# Patient Record
Sex: MALE | Race: WHITE | NOT HISPANIC OR LATINO | URBAN - METROPOLITAN AREA
[De-identification: names, ages, dates, MRNs, and addresses within clinical notes are randomized per-mention and may not be internally consistent; named-entity substitution may affect disease eponyms.]

---

## 2018-05-05 ENCOUNTER — INPATIENT (INPATIENT)
Facility: HOSPITAL | Age: 58
LOS: 5 days | Discharge: HOME | DRG: 41 | End: 2018-05-11
Attending: HOSPITALIST | Admitting: HOSPITALIST
Payer: COMMERCIAL

## 2018-05-05 VITALS
OXYGEN SATURATION: 97 % | HEIGHT: 73 IN | TEMPERATURE: 98 F | DIASTOLIC BLOOD PRESSURE: 74 MMHG | HEART RATE: 73 BPM | WEIGHT: 190.04 LBS | RESPIRATION RATE: 20 BRPM | SYSTOLIC BLOOD PRESSURE: 130 MMHG

## 2018-05-05 DIAGNOSIS — Z29.9 ENCOUNTER FOR PROPHYLACTIC MEASURES, UNSPECIFIED: ICD-10-CM

## 2018-05-05 DIAGNOSIS — F10.21 ALCOHOL DEPENDENCE, IN REMISSION: ICD-10-CM

## 2018-05-05 DIAGNOSIS — T14.91XA SUICIDE ATTEMPT, INITIAL ENCOUNTER: ICD-10-CM

## 2018-05-05 DIAGNOSIS — F32.2 MAJOR DEPRESSIVE DISORDER, SINGLE EPISODE, SEVERE WITHOUT PSYCHOTIC FEATURES: ICD-10-CM

## 2018-05-05 DIAGNOSIS — E87.1 HYPO-OSMOLALITY AND HYPONATREMIA: ICD-10-CM

## 2018-05-05 DIAGNOSIS — R74.0 NONSPECIFIC ELEVATION OF LEVELS OF TRANSAMINASE AND LACTIC ACID DEHYDROGENASE [LDH]: ICD-10-CM

## 2018-05-05 LAB
AMPHET UR-MCNC: NEGATIVE — SIGNIFICANT CHANGE UP
ANION GAP SERPL CALC-SCNC: 10 MMOL/L — SIGNIFICANT CHANGE UP (ref 5–17)
ANION GAP SERPL CALC-SCNC: 13 MMOL/L — SIGNIFICANT CHANGE UP (ref 5–17)
ANION GAP SERPL CALC-SCNC: 14 MMOL/L — SIGNIFICANT CHANGE UP (ref 5–17)
APAP SERPL-MCNC: <15 UG/ML — SIGNIFICANT CHANGE UP (ref 10–30)
APPEARANCE UR: CLEAR — SIGNIFICANT CHANGE UP
BACTERIA # UR AUTO: PRESENT /HPF
BARBITURATES UR SCN-MCNC: NEGATIVE — SIGNIFICANT CHANGE UP
BASOPHILS NFR BLD AUTO: 0.3 % — SIGNIFICANT CHANGE UP (ref 0–2)
BENZODIAZ UR-MCNC: NEGATIVE — SIGNIFICANT CHANGE UP
BILIRUB UR-MCNC: NEGATIVE — SIGNIFICANT CHANGE UP
BUN SERPL-MCNC: 11 MG/DL — SIGNIFICANT CHANGE UP (ref 7–23)
BUN SERPL-MCNC: 12 MG/DL — SIGNIFICANT CHANGE UP (ref 7–23)
BUN SERPL-MCNC: 13 MG/DL — SIGNIFICANT CHANGE UP (ref 7–23)
CALCIUM SERPL-MCNC: 8.2 MG/DL — LOW (ref 8.4–10.5)
CALCIUM SERPL-MCNC: 8.4 MG/DL — SIGNIFICANT CHANGE UP (ref 8.4–10.5)
CALCIUM SERPL-MCNC: 8.9 MG/DL — SIGNIFICANT CHANGE UP (ref 8.4–10.5)
CHLORIDE SERPL-SCNC: 85 MMOL/L — LOW (ref 96–108)
CHLORIDE SERPL-SCNC: 88 MMOL/L — LOW (ref 96–108)
CHLORIDE SERPL-SCNC: 91 MMOL/L — LOW (ref 96–108)
CO2 SERPL-SCNC: 21 MMOL/L — LOW (ref 22–31)
CO2 SERPL-SCNC: 22 MMOL/L — SIGNIFICANT CHANGE UP (ref 22–31)
CO2 SERPL-SCNC: 25 MMOL/L — SIGNIFICANT CHANGE UP (ref 22–31)
COCAINE METAB.OTHER UR-MCNC: NEGATIVE — SIGNIFICANT CHANGE UP
COLOR SPEC: YELLOW — SIGNIFICANT CHANGE UP
CREAT ?TM UR-MCNC: 102 MG/DL — SIGNIFICANT CHANGE UP
CREAT SERPL-MCNC: 0.61 MG/DL — SIGNIFICANT CHANGE UP (ref 0.5–1.3)
CREAT SERPL-MCNC: 0.7 MG/DL — SIGNIFICANT CHANGE UP (ref 0.5–1.3)
CREAT SERPL-MCNC: 0.7 MG/DL — SIGNIFICANT CHANGE UP (ref 0.5–1.3)
DIFF PNL FLD: (no result)
EOSINOPHIL NFR BLD AUTO: 0.8 % — SIGNIFICANT CHANGE UP (ref 0–6)
EPI CELLS # UR: SIGNIFICANT CHANGE UP /HPF (ref 0–5)
GLUCOSE SERPL-MCNC: 101 MG/DL — HIGH (ref 70–99)
GLUCOSE SERPL-MCNC: 161 MG/DL — HIGH (ref 70–99)
GLUCOSE SERPL-MCNC: 87 MG/DL — SIGNIFICANT CHANGE UP (ref 70–99)
GLUCOSE UR QL: NEGATIVE — SIGNIFICANT CHANGE UP
HCT VFR BLD CALC: 39.1 % — SIGNIFICANT CHANGE UP (ref 39–50)
HGB BLD-MCNC: 14.2 G/DL — SIGNIFICANT CHANGE UP (ref 13–17)
KETONES UR-MCNC: 15 MG/DL
LEUKOCYTE ESTERASE UR-ACNC: NEGATIVE — SIGNIFICANT CHANGE UP
LYMPHOCYTES # BLD AUTO: 13.7 % — SIGNIFICANT CHANGE UP (ref 13–44)
MCHC RBC-ENTMCNC: 33.1 PG — SIGNIFICANT CHANGE UP (ref 27–34)
MCHC RBC-ENTMCNC: 36.3 G/DL — HIGH (ref 32–36)
MCV RBC AUTO: 91.1 FL — SIGNIFICANT CHANGE UP (ref 80–100)
METHADONE UR-MCNC: NEGATIVE — SIGNIFICANT CHANGE UP
MONOCYTES NFR BLD AUTO: 16.7 % — HIGH (ref 2–14)
NEUTROPHILS NFR BLD AUTO: 68.5 % — SIGNIFICANT CHANGE UP (ref 43–77)
NITRITE UR-MCNC: NEGATIVE — SIGNIFICANT CHANGE UP
OPIATES UR-MCNC: NEGATIVE — SIGNIFICANT CHANGE UP
PCP SPEC-MCNC: SIGNIFICANT CHANGE UP
PCP UR-MCNC: NEGATIVE — SIGNIFICANT CHANGE UP
PH UR: 6.5 — SIGNIFICANT CHANGE UP (ref 5–8)
PLATELET # BLD AUTO: 241 K/UL — SIGNIFICANT CHANGE UP (ref 150–400)
POTASSIUM SERPL-MCNC: 3.7 MMOL/L — SIGNIFICANT CHANGE UP (ref 3.5–5.3)
POTASSIUM SERPL-MCNC: 3.9 MMOL/L — SIGNIFICANT CHANGE UP (ref 3.5–5.3)
POTASSIUM SERPL-MCNC: 4.1 MMOL/L — SIGNIFICANT CHANGE UP (ref 3.5–5.3)
POTASSIUM SERPL-SCNC: 3.7 MMOL/L — SIGNIFICANT CHANGE UP (ref 3.5–5.3)
POTASSIUM SERPL-SCNC: 3.9 MMOL/L — SIGNIFICANT CHANGE UP (ref 3.5–5.3)
POTASSIUM SERPL-SCNC: 4.1 MMOL/L — SIGNIFICANT CHANGE UP (ref 3.5–5.3)
PROT UR-MCNC: (no result) MG/DL
RBC # BLD: 4.29 M/UL — SIGNIFICANT CHANGE UP (ref 4.2–5.8)
RBC # FLD: 11.8 % — SIGNIFICANT CHANGE UP (ref 10.3–16.9)
RBC CASTS # UR COMP ASSIST: (no result) /HPF
SALICYLATES SERPL-MCNC: <0.3 MG/DL — LOW (ref 2.8–20)
SODIUM SERPL-SCNC: 121 MMOL/L — LOW (ref 135–145)
SODIUM SERPL-SCNC: 122 MMOL/L — LOW (ref 135–145)
SODIUM SERPL-SCNC: 126 MMOL/L — LOW (ref 135–145)
SODIUM UR-SCNC: 44 MMOL/L — SIGNIFICANT CHANGE UP
SP GR SPEC: 1.01 — SIGNIFICANT CHANGE UP (ref 1–1.03)
THC UR QL: NEGATIVE — SIGNIFICANT CHANGE UP
TSH SERPL-MCNC: 0.93 UIU/ML — SIGNIFICANT CHANGE UP (ref 0.35–4.94)
UROBILINOGEN FLD QL: 0.2 E.U./DL — SIGNIFICANT CHANGE UP
WBC # BLD: 6.4 K/UL — SIGNIFICANT CHANGE UP (ref 3.8–10.5)
WBC # FLD AUTO: 6.4 K/UL — SIGNIFICANT CHANGE UP (ref 3.8–10.5)
WBC UR QL: < 5 /HPF — SIGNIFICANT CHANGE UP

## 2018-05-05 PROCEDURE — 99223 1ST HOSP IP/OBS HIGH 75: CPT | Mod: GC

## 2018-05-05 PROCEDURE — 93010 ELECTROCARDIOGRAM REPORT: CPT

## 2018-05-05 PROCEDURE — 71046 X-RAY EXAM CHEST 2 VIEWS: CPT | Mod: 26

## 2018-05-05 PROCEDURE — 99285 EMERGENCY DEPT VISIT HI MDM: CPT | Mod: 25

## 2018-05-05 RX ORDER — SODIUM CHLORIDE 9 MG/ML
1000 INJECTION INTRAMUSCULAR; INTRAVENOUS; SUBCUTANEOUS ONCE
Qty: 0 | Refills: 0 | Status: COMPLETED | OUTPATIENT
Start: 2018-05-05 | End: 2018-05-05

## 2018-05-05 RX ORDER — TETANUS TOXOID, REDUCED DIPHTHERIA TOXOID AND ACELLULAR PERTUSSIS VACCINE, ADSORBED 5; 2.5; 8; 8; 2.5 [IU]/.5ML; [IU]/.5ML; UG/.5ML; UG/.5ML; UG/.5ML
0.5 SUSPENSION INTRAMUSCULAR ONCE
Qty: 0 | Refills: 0 | Status: COMPLETED | OUTPATIENT
Start: 2018-05-05 | End: 2018-05-05

## 2018-05-05 RX ORDER — ACETAMINOPHEN 500 MG
1000 TABLET ORAL ONCE
Qty: 0 | Refills: 0 | Status: COMPLETED | OUTPATIENT
Start: 2018-05-05 | End: 2018-05-05

## 2018-05-05 RX ORDER — ACETAMINOPHEN 500 MG
650 TABLET ORAL EVERY 6 HOURS
Qty: 0 | Refills: 0 | Status: DISCONTINUED | OUTPATIENT
Start: 2018-05-06 | End: 2018-05-11

## 2018-05-05 RX ORDER — SODIUM CHLORIDE 9 MG/ML
1000 INJECTION INTRAMUSCULAR; INTRAVENOUS; SUBCUTANEOUS
Qty: 0 | Refills: 0 | Status: DISCONTINUED | OUTPATIENT
Start: 2018-05-05 | End: 2018-05-05

## 2018-05-05 RX ADMIN — TETANUS TOXOID, REDUCED DIPHTHERIA TOXOID AND ACELLULAR PERTUSSIS VACCINE, ADSORBED 0.5 MILLILITER(S): 5; 2.5; 8; 8; 2.5 SUSPENSION INTRAMUSCULAR at 18:53

## 2018-05-05 RX ADMIN — Medication 1000 MILLIGRAM(S): at 23:00

## 2018-05-05 RX ADMIN — Medication 400 MILLIGRAM(S): at 22:57

## 2018-05-05 RX ADMIN — SODIUM CHLORIDE 150 MILLILITER(S): 9 INJECTION INTRAMUSCULAR; INTRAVENOUS; SUBCUTANEOUS at 20:45

## 2018-05-05 RX ADMIN — SODIUM CHLORIDE 2000 MILLILITER(S): 9 INJECTION INTRAMUSCULAR; INTRAVENOUS; SUBCUTANEOUS at 16:49

## 2018-05-05 RX ADMIN — SODIUM CHLORIDE 2000 MILLILITER(S): 9 INJECTION INTRAMUSCULAR; INTRAVENOUS; SUBCUTANEOUS at 18:11

## 2018-05-05 NOTE — H&P ADULT - PROBLEM SELECTOR PLAN 5
F: 150 cc/hr NS  E: replete PRN  N: regular diet  No HSQ needed  Full Code  Dispo: Medicine then psychiatry. When discharged patient will need primary care follow up as he does not have one. Mild transaminiits on admission labs.   -Hepatitis Panel in AM, can be followed up outpatient.  -CMP in AM

## 2018-05-05 NOTE — ED PROVIDER NOTE - OBJECTIVE STATEMENT
59 yo male h/o etoh abuse (sober x 10y), depression c/o financial difficulties that are making him depressed and suicidal.  Pt cut his L wrist w a knife today to try to kill himself.  Pt denies other self harm, etoh and drug abuse.  Pt reports poor sleep and no relief w ambien. Wife made 1st appt w psychiatrist for 5/8.  Pt notes numbness in L index and middle finger, denies sig pain at wound.  Unsure of last td. No other physical complaint.

## 2018-05-05 NOTE — ED ADULT NURSE REASSESSMENT NOTE - NS ED NURSE REASSESS COMMENT FT1
Pt to be admitted to medicine with psych coverage per ANTOINETTE Benitez. Report called to 7 uris ARABELLA Robison, aware of pt's 1:1 status. Pt belongings checked by security for safety prior to transport to 7 [facility]. EDT Hallie remaining on 1:1 for transport, per AND EDT to return to ED after transport completed. Pt in transport to 7 uris.
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
## 2018-05-05 NOTE — ED BEHAVIORAL HEALTH ASSESSMENT NOTE - RISK ASSESSMENT
Patient is at acute risk of harm to self given this morning's attempt, feelings of entrapment and ruminative flooding, the seriousness of his financial situation, his ambivalence and intense guilt after this morning's attempt, his gender and race, and lack of outpatient treatment. He require inpatient hospitalization for his safety.

## 2018-05-05 NOTE — ED ADULT TRIAGE NOTE - CHIEF COMPLAINT QUOTE
" I cut my arm this morning with a knife , I was upset with so many things ," Denies any hearing voices nor hallucination .

## 2018-05-05 NOTE — ED BEHAVIORAL HEALTH ASSESSMENT NOTE - NS ED BHA MSE SPEECH VOLUME
I will START or STAY ON the medications listed below when I get home from the hospital:    doxycycline hyclate 100 mg oral tablet  -- 1 tab(s) by mouth 2 times a day x 5 days  -- Indication: For Cellulitis    clotrimazole 1% topical cream  -- 1 application on skin 2 times a day  -- Indication: For tinea    Cipro 250 mg oral tablet  -- 1 tab(s) by mouth every 12 hours x 5 days  -- Indication: For Cellulitis
Normal

## 2018-05-05 NOTE — H&P ADULT - HISTORY OF PRESENT ILLNESS
58 year old male hx etoh abuse sober for 20+ years, and self-reported hx of depression presents to [facility] after suicide attempt. Patient reports that             In the ED:  ICU Vital Signs Last 24 Hrs  T(C): 36.8 (05 May 2018 15:08), Max: 36.8 (05 May 2018 15:08)  T(F): 98.3 (05 May 2018 15:08), Max: 98.3 (05 May 2018 15:08)  HR: 73 (05 May 2018 15:08) (73 - 73)  BP: 130/74 (05 May 2018 15:08) (130/74 - 130/74)  BP(mean): --  ABP: --  ABP(mean): --  RR: 20 (05 May 2018 15:08) (20 - 20)  SpO2: 97% (05 May 2018 15:08) (97% - 97%)    Patient received 2 Liters NS bolus, and started on 150cc normal saline per hour. Seen by plastics and pscyhiatry in ER. 58 year old male hx etoh abuse sober for 20+ years, and self-reported hx of depression presents to [facility] after suicide attempt. Patient at bedside with his wife and sitter. They both report that over the last year the stressors on them have been astronomical. Patient reports several years prior he and his wife bought a house in the [facility] which is now costing them a significant amount of money as renter is paying half the amount. In addition he states since the beginning of the year he has not been able to sleep well and has been taking a significant amount of supplements which he believes have been contributing to his increasing anxiety and lack of sleep. He also has been working 2 jobs. He also reports decreased PO intake over the last 1-2 weeks. He has been sober from etoh since 1993. Denies other drug use. Patient's stressors pushed him to attempt suicide by slitting his wrist (left). Immediately after, he regretted his decision and came to [facility] for evaluation. Denies headache, vision changes, chest pain, shortness of breath, abdominal pain, urinary issues.     In the ED:  ICU Vital Signs Last 24 Hrs  T(C): 36.8 (05 May 2018 15:08), Max: 36.8 (05 May 2018 15:08)  T(F): 98.3 (05 May 2018 15:08), Max: 98.3 (05 May 2018 15:08)  HR: 73 (05 May 2018 15:08) (73 - 73)  BP: 130/74 (05 May 2018 15:08) (130/74 - 130/74)  BP(mean): --  ABP: --  ABP(mean): --  RR: 20 (05 May 2018 15:08) (20 - 20)  SpO2: 97% (05 May 2018 15:08) (97% - 97%)    Patient received 2 Liters NS bolus, and started on 150cc normal saline per hour. Seen by plastics and pscyhiatry in ER.

## 2018-05-05 NOTE — ED BEHAVIORAL HEALTH ASSESSMENT NOTE - SUMMARY
The patient is a 58-year old man with a past history of alcohol use disorder, likely depressive illness, not currently in outpatient care, who presented to ED today after suicide attempt via cutting wrist. He is currently experiencing ruminative thoughts, intense guilt, feeling trapped, and although not currently suicidal at the moment, represents an acute suicide risk given this morning's attempt, feelings of entrapment and ruminative flooding, the seriousness of his financial situation, his ambivalence and intense guilt after this morning's attempt, his gender and race, and lack of outpatient treatment. He require inpatient hospitalization for his safety.

## 2018-05-05 NOTE — H&P ADULT - PROBLEM SELECTOR PLAN 3
Likely hypovolemic hyponatremia s/p 2 liters. Serum osmolality low, increased urine osmolality. Small improvement of 121-122 after 2 liters of fluids. Patient asymptomatic, increasing likelyhood of non-acute hyponatremia.   -c/w NS 150cc/hr   -BMP 10PM  -Avoid over-correction, no more than 6-8 mEq over 24 hours. s/p Suicide attempt with multiple stressors as above. Negative urine tox, salicylate level,   -f/u psychiatry: Transfer to -[facility] once sodium improves and bed available. s/p plastic surgery repair, follow up recs

## 2018-05-05 NOTE — ED BEHAVIORAL HEALTH ASSESSMENT NOTE - DETAILS
No admissions or presentations in last 2 years Patient attempted suicide today Spoke to Nayan BELL Spoke with  Director in ED, who will sign out patient to oncoming staff Alcoholism in family Left hand numbness

## 2018-05-05 NOTE — ED BEHAVIORAL HEALTH ASSESSMENT NOTE - HPI (INCLUDE ILLNESS QUALITY, SEVERITY, DURATION, TIMING, CONTEXT, MODIFYING FACTORS, ASSOCIATED SIGNS AND SYMPTOMS)
Mr. Jimenez is a 58-year-old , employed, gentleman, domiciled with his wife, with a past psychiatric history of alcohol use disorder in complete remission, sober since 1993, in , a self reported history of depression, not currently in outpatient treatment, no history of suicide attempt or hospitalizations, who presents to the ED this evening accompanied by his wife after intentionally cutting his left wrist this morning with an intent to die.     Patient received calm and cooperative at bedside with wife present. The patient explains that he and his wife had bought a house in the [facility] many years ago, and after a series of bad decisions, were now in dire financial straits. They have had a young man renting the apartment but only paying half of the rent. The patient, while considering the financial mess he is now in, and seeing no way out, decided to end his life by cutting his left wrist with a knife this morning while his wife was out. He confirms an intent to die. He now feels quite guilty about his actions, but even more so about the actions that led to his current financial position. The patient says he wishes he would have called someone from  or gone to a meeting. The patient's wife says that he is usually so strong and she does not know why this happened. The patient goes on the explain that since January, he has not been sleeping well. In the months leading up to January, he had been taking Theanine as a supplement, as much as 4 times a day, which soon led to intense anxiety. Since January, he has had insomnia and worsening depressive symptoms. He endorses rumination and now feeling trapped by his financial situation. 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 He denies any urges to drink, denies any history of suicide attempt, but does say that he used to feel suicidal at times while drinking. Of note, the patient has an upcoming psychiatry appointment at the [facility] with Dr. Rich Mayo on 5/8.

## 2018-05-05 NOTE — H&P ADULT - PROBLEM SELECTOR PLAN 4
F: 150 cc/hr NS  E: replete PRN  N: regular diet  No HSQ needed  Full Code Mild transaminiits on admission labs.   -Hepatitis Panel in AM, can be followed up outpatient.  -CMP in AM s/p Suicide attempt with multiple stressors as above. Negative urine tox, salicylate level,   -f/u psychiatry: Transfer to -[facility] once sodium improves and bed available.

## 2018-05-05 NOTE — ED BEHAVIORAL HEALTH ASSESSMENT NOTE - PRIMARY DX
Current severe episode of major depressive disorder without psychotic features without prior episode Alcohol use disorder, moderate, in sustained remission

## 2018-05-05 NOTE — H&P ADULT - PROBLEM SELECTOR PLAN 6
F: 150 cc/hr NS  E: replete PRN  N: regular diet  No HSQ needed  Full Code  Dispo: Medicine then psychiatry. When discharged patient will need primary care follow up as he does not have one.

## 2018-05-05 NOTE — ED BEHAVIORAL HEALTH ASSESSMENT NOTE - MEDICAL ISSUES AND PLAN (INCLUDE STANDING AND PRN MEDICATION)
Patient with left median nerve damage, currently in splint, Dr. Jo will follow [phone]; patient will require surgery within next two weeks; will order tylenol PRN for pain

## 2018-05-05 NOTE — ED PROVIDER NOTE - CARE PLAN
Principal Discharge DX:	Suicidal ideation  Secondary Diagnosis:	Wrist laceration, left, initial encounter  Secondary Diagnosis:	Tendon laceration

## 2018-05-05 NOTE — PATIENT PROFILE ADULT. - PRO MENTAL HEALTH SX RECENT
difficulty concentrating/angry/irritable/self-blame/change in sleep pattern/feeling depressed/feeling down/suicidal ideation/attempt to harm/feeling hopelessness/little interest or pleasure in doing things/sad

## 2018-05-05 NOTE — H&P ADULT - NSHPLABSRESULTS_GEN_ALL_CORE
.  LABS:                         14.2   6.4   )-----------( 241      ( 05 May 2018 16:08 )             39.1         122<L>  |  88<L>  |  11  ----------------------------<  87  3.9   |  21<L>  |  0.61    Ca    8.2<L>      05 May 2018 19:08    TPro  7.2  /  Alb  4.2  /  TBili  0.8  /  DBili  <0.2  /  AST  55<H>  /  ALT  49<H>  /  AlkPhos  37<L>        Urinalysis Basic - ( 05 May 2018 17:39 )    Color: Yellow / Appearance: Clear / S.015 / pH: x  Gluc: x / Ketone: 15 mg/dL  / Bili: Negative / Urobili: 0.2 E.U./dL   Blood: x / Protein: Trace mg/dL / Nitrite: NEGATIVE   Leuk Esterase: NEGATIVE / RBC: 5-10 /HPF / WBC < 5 /HPF   Sq Epi: x / Non Sq Epi: 0-5 /HPF / Bacteria: Present /HPF                RADIOLOGY, EKG & ADDITIONAL TESTS: Reviewed.

## 2018-05-05 NOTE — H&P ADULT - ASSESSMENT
59 yo old male with suicide attempt left wrist laceration admitted to medicine due to hypovolemic hyponatremia 2/2 decreased PO intake with plan to transfer to [facility]-ur[facility] psychiatry once medically cleared and bed-available.

## 2018-05-05 NOTE — ED PROVIDER NOTE - PROGRESS NOTE DETAILS
Dr Jo consulted for wrist lac and will come to ed to eval pt.  Psych consulted - they are deferring pt's eval until oncoming md arrives at 6pm and state that there are currently no male beds in the system so pt may be dispo issue if he needs admit. Na low - pt reports poor po's, suspect dehydration.  Serum osm low c/w this, await urine lytes.  NS bolus ordered.  Pt awaits psych, hand. Pt eval by palmaris longus severed and flexor carpi radialis 70% severed as well as median nerve severed.  He closed the wound and placed pt in a splint.  Pt needs operative repair within 2 wks if he's psychiatrically cleared but he will not take pt to OR while pt at risk for noncompliance splint isolation and treatment.  Psych in ed to eval pt. Pt eval by palmaris longus severed and flexor carpi radialis 70% severed as well as median nerve severed.  He closed the wound and placed pt in a splint.  Pt needs operative repair within 2 wks (can be outpt) if he's psychiatrically cleared but he will not take pt to OR while pt suicidal and at high risk for noncompliance with splint isolation and treatment.  Psych in ed to eval pt. Eval by psych - pt needs admit; they are working on obtaining a bed. Pt pending repeat bmp after ivf.  If improved bmp, pt will be medically clear for psych admission and will need further mgmt of his lac and tendon/nerve injury with Dr Jo once psychiatrically clear within the next two wks.  Pt will be signed out at 7pm to Dr Smith and ANTOINETTE Benitez pending bmp and psych dispo. trino: pt received at sign out from dr lópez as si attempt (cut wrist -- seen and sutured/splinted by hand given tendon injury), seen by psych and recommending admission but no beds at this time, pt also found to be hyponatremic - believed to be 2/2 lack of po intake, however unchanged after ivf, will admit to med until lytes improve then to be transferred to psych

## 2018-05-05 NOTE — H&P ADULT - PROBLEM SELECTOR PLAN 1
Suicide attempt as above with left wrist injury. S/p suturing by plastic surgery Dr. Jo with severe tendon damage. No pain.   -f/u plastics Dr. Jo  -Serial examinations of left wrist/ fingers Suicide attempt as above with left wrist injury. S/p suturing by plastic surgery Dr. Jo with severe tendon damage. No pain.   -f/u plastics Dr. Jo  -Serial examinations of left wrist/ fingers  -f/u Psych: 1:1 constant observation, and transfer once medically cleared. Likely hypovolemic hyponatremia s/p 2 liters. Serum osmolality low, increased urine osmolality. Small improvement of 121-122 after 2 liters of fluids. Patient asymptomatic, increasing likelyhood of non-acute hyponatremia.   -c/w NS 150cc/hr   -BMP 10PM  -Avoid over-correction, no more than 6-8 mEq over 24 hours.

## 2018-05-05 NOTE — ED BEHAVIORAL HEALTH ASSESSMENT NOTE - INDICATION
Patient with ace bandage around left wrist that could potentially be used as device for strangulation

## 2018-05-05 NOTE — PATIENT PROFILE ADULT. - VISION (WITH CORRECTIVE LENSES IF THE PATIENT USUALLY WEARS THEM):
Normal vision: sees adequately in most situations; can see medication labels, newsprint/wears for distance

## 2018-05-05 NOTE — H&P ADULT - PROBLEM SELECTOR PROBLEM 2
Current severe episode of major depressive disorder without psychotic features without prior episode Suicide attempt

## 2018-05-05 NOTE — H&P ADULT - PROBLEM SELECTOR PLAN 2
s/p Suicide attempt with multiple stressors as above. Negative urine tox, salicylate level,   -f/u psychiatry: Transfer to -[facility] once sodium improves and bed available. Suicide attempt as above with left wrist injury. S/p suturing by plastic surgery Dr. Jo with severe tendon damage. No pain.   -f/u plastics Dr. Jo  -Serial examinations of left wrist/ fingers  -f/u Psych: 1:1 constant observation, and transfer once medically cleared.

## 2018-05-05 NOTE — H&P ADULT - ATTENDING COMMENTS
patient seen and examined    reviewed vs, labs, available radiological reports/ studies, ekg     agree w/ PE findings as above w/ additions/ exceptions/ pertinent findings:     1. hyponatremia:   2 suicide attempt  3. monitor LFTs, check hepatitis panel    3.   4. patient seen and examined    reviewed vs, labs, available radiological reports/ studies, ekg     agree w/ PE findings as above,  exam deferred    1. hyponatremia: on IVFs initially overnight, dcd to avoid sodium overcorrection, monitor BMP   2 suicide attempt: on constant observation, followup psych recs  3. monitor LFTs, check hepatitis panel    3.   4.

## 2018-05-05 NOTE — ED PROVIDER NOTE - MEDICAL DECISION MAKING DETAILS
Pt w si w gesture - cut wrist w subsequent tendon lac and decreased sensation suggestive of nerve injury as well; motor function intact.  Pt placed on 1:1; psych clearance ordered.  Psych consulted and will eval.  Hand consulted for complicated wrist lac.  Td updated.  Pt likely needs psych admit for si.

## 2018-05-05 NOTE — H&P ADULT - NSHPPHYSICALEXAM_GEN_ALL_CORE
.  VITAL SIGNS:  T(C): 36.8 (05-05-18 @ 15:08), Max: 36.8 (05-05-18 @ 15:08)  T(F): 98.3 (05-05-18 @ 15:08), Max: 98.3 (05-05-18 @ 15:08)  HR: 73 (05-05-18 @ 15:08) (73 - 73)  BP: 130/74 (05-05-18 @ 15:08) (130/74 - 130/74)  BP(mean): --  RR: 20 (05-05-18 @ 15:08) (20 - 20)  SpO2: 97% (05-05-18 @ 15:08) (97% - 97%)  Wt(kg): --    PHYSICAL EXAM:    Constitutional: WDWN resting comfortably in bed; NAD  Head: NC/AT  Eyes: PERRL, EOMI, anicteric sclera  ENT: no nasal discharge; uvula midline, no oropharyngeal erythema or exudates; MMM  Neck: supple; no JVD or thyromegaly  Respiratory: CTA B/L; no W/R/R, no retractions  Cardiac: +S1/S2; RRR; no M/R/G; PMI non-displaced  Gastrointestinal: soft, NT/ND; no rebound or guarding; +BSx4  Genitourinary: normal external genitalia  Back: spine midline, no bony tenderness or step-offs; no CVAT B/L  Extremities: WWP, no clubbing or cyanosis; no peripheral edema  Musculoskeletal: NROM x4; no joint swelling, tenderness or erythema  Vascular: 2+ radial, femoral, DP/PT pulses B/L  Dermatologic: skin warm, dry and intact; no rashes, wounds, or scars  Lymphatic: no submandibular or cervical LAD  Neurologic: AAOx3; CNII-XII grossly intact; no focal deficits  Psychiatric: affect and characteristics of appearance, verbalizations, behaviors are appropriate .  VITAL SIGNS:  T(C): 36.8 (05-05-18 @ 15:08), Max: 36.8 (05-05-18 @ 15:08)  T(F): 98.3 (05-05-18 @ 15:08), Max: 98.3 (05-05-18 @ 15:08)  HR: 73 (05-05-18 @ 15:08) (73 - 73)  BP: 130/74 (05-05-18 @ 15:08) (130/74 - 130/74)  BP(mean): --  RR: 20 (05-05-18 @ 15:08) (20 - 20)  SpO2: 97% (05-05-18 @ 15:08) (97% - 97%)  Wt(kg): --    PHYSICAL EXAM:    Constitutional: WDWN resting comfortably in bed; NAD  Head: NC/AT  Eyes: PERRL, EOMI, anicteric sclera  ENT: no nasal discharge; uvula midline, no oropharyngeal erythema or exudates; MMM  Neck: supple; no JVD or thyromegaly  Respiratory: CTA B/L; no W/R/R, no retractions  Cardiac: +S1/S2; RRR; no M/R/G; PMI non-displaced  Gastrointestinal: soft, NT/ND; no rebound or guarding; +BSx4  Genitourinary: normal external genitalia  Back: spine midline, no bony tenderness or step-offs; no CVAT B/L  Extremities: +left wrist bandaged in ACE wrap. No cyanosis, warmth in all fingers, good capillary refill <2 seconds. Able to move and sense all fingers. Right wrist unremarkable.   Musculoskeletal: NROM x4; no joint swelling, tenderness or erythema  Vascular: 2+ radial, femoral on right, left obscured.   Dermatologic: skin warm, dry and intact. +erythema overlying scalp.   Lymphatic: no submandibular or cervical LAD  Neurologic: AAOx3; CNII-XII grossly intact; no focal deficits  Psychiatric: deeply regretful of suicide attempt. Endorses that he was an "idiot" and it was "stupid" that he did such a thing.

## 2018-05-05 NOTE — ED PROVIDER NOTE - MUSCULOSKELETAL, MLM
Spine appears normal, range of motion is not limited, no muscle or joint tenderness, LUE: 6 cm deep lac w exposed intact tendon as well as lacerated tendon ends visible at distal aspect of lac, from all fingers, flexion full strength mcp, pip, dip jts all fingers, decreased sensation to light tough index and middle finger, radial and ulnar 1+, cap refill < 3s, motor 5/5; wrist flexion/ext intact

## 2018-05-05 NOTE — ED BEHAVIORAL HEALTH ASSESSMENT NOTE - SUICIDE PROTECTIVE FACTORS
Supportive social network or family/Positive therapeutic relationships/Responsibility to family and others

## 2018-05-05 NOTE — H&P ADULT - PROBLEM SELECTOR PROBLEM 4
Need for prophylactic measure Transaminitis Current severe episode of major depressive disorder without psychotic features without prior episode

## 2018-05-05 NOTE — H&P ADULT - PROBLEM SELECTOR PROBLEM 3
Hyponatremia with decreased serum osmolality Current severe episode of major depressive disorder without psychotic features without prior episode Wrist laceration, left, initial encounter

## 2018-05-05 NOTE — ED BEHAVIORAL HEALTH ASSESSMENT NOTE - DESCRIPTION
Plastic Surgeon Dr. Jo had sutured and splinted the patient's left wrist, as he had suffered a severe tendon injury with median nerve damage. Patient maintained on 1:1. None Lives with wife, works two jobs, in graduate school

## 2018-05-05 NOTE — ED ADULT NURSE NOTE - OBJECTIVE STATEMENT
Pt presents to ED for suicidal attempt PTA. Pt states "I've been under a lot of stress for the last few months, especially financially. I made some bad decisions about my house that I regret. In the last few days I've really been down on myself, and instead of asking for help I did something stupid and I cut my wrist. I should've asked for help." Pt denies HI, auditory/visual hallucinations. Pt presents to ED for suicidal attempt PTA. Pt states "I've been under a lot of stress for the last few months, especially financially. I made some bad decisions about my house that I regret. In the last few days I've really been down on myself, and instead of asking for help I did something stupid and I cut my wrist. I should've asked for help." Pt denies HI, auditory/visual hallucinations, no previous suicide attempts in the past. 1:1 initiated in triage, pt calm and cooperative. Pt presents to ED for suicidal attempt PTA. Pt states "I've been under a lot of stress for the last few months, especially financially. I made some bad decisions about my house that I regret. In the last few days I've really been down on myself, and instead of asking for help I did something stupid and I cut my wrist. I should've asked for help." Pt denies HI, auditory/visual hallucinations, no previous suicide attempts in the past. 1:1 initiated in triage, pt calm and cooperative. Pt denies other medical complaints including fever, chills, HA, cough, dizziness, lightheadedness, visual changes, numbness/tingling, neck pain, CP, palpitations, SOB, N/V/D, difficulty urinating, difficulty passing BM. Pt with 3 cm deep laceration to inside of L wrist, reports 5/10 pain at site, bleeding well controlled PTA.
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
## 2018-05-06 DIAGNOSIS — S69.92XS UNSPECIFIED INJURY OF LEFT WRIST, HAND AND FINGER(S), SEQUELA: Chronic | ICD-10-CM

## 2018-05-06 DIAGNOSIS — S61.512A LACERATION WITHOUT FOREIGN BODY OF LEFT WRIST, INITIAL ENCOUNTER: ICD-10-CM

## 2018-05-06 LAB
ALBUMIN SERPL ELPH-MCNC: 3.8 G/DL — SIGNIFICANT CHANGE UP (ref 3.3–5)
ALP SERPL-CCNC: 35 U/L — LOW (ref 40–120)
ALT FLD-CCNC: 40 U/L — SIGNIFICANT CHANGE UP (ref 10–45)
ANION GAP SERPL CALC-SCNC: 11 MMOL/L — SIGNIFICANT CHANGE UP (ref 5–17)
ANION GAP SERPL CALC-SCNC: 11 MMOL/L — SIGNIFICANT CHANGE UP (ref 5–17)
ANION GAP SERPL CALC-SCNC: 13 MMOL/L — SIGNIFICANT CHANGE UP (ref 5–17)
ANION GAP SERPL CALC-SCNC: 13 MMOL/L — SIGNIFICANT CHANGE UP (ref 5–17)
AST SERPL-CCNC: 41 U/L — HIGH (ref 10–40)
BILIRUB SERPL-MCNC: 0.7 MG/DL — SIGNIFICANT CHANGE UP (ref 0.2–1.2)
BUN SERPL-MCNC: 12 MG/DL — SIGNIFICANT CHANGE UP (ref 7–23)
BUN SERPL-MCNC: 12 MG/DL — SIGNIFICANT CHANGE UP (ref 7–23)
BUN SERPL-MCNC: 13 MG/DL — SIGNIFICANT CHANGE UP (ref 7–23)
BUN SERPL-MCNC: 15 MG/DL — SIGNIFICANT CHANGE UP (ref 7–23)
CALCIUM SERPL-MCNC: 8.5 MG/DL — SIGNIFICANT CHANGE UP (ref 8.4–10.5)
CALCIUM SERPL-MCNC: 8.6 MG/DL — SIGNIFICANT CHANGE UP (ref 8.4–10.5)
CALCIUM SERPL-MCNC: 8.7 MG/DL — SIGNIFICANT CHANGE UP (ref 8.4–10.5)
CALCIUM SERPL-MCNC: 8.7 MG/DL — SIGNIFICANT CHANGE UP (ref 8.4–10.5)
CHLORIDE SERPL-SCNC: 89 MMOL/L — LOW (ref 96–108)
CHLORIDE SERPL-SCNC: 90 MMOL/L — LOW (ref 96–108)
CHLORIDE SERPL-SCNC: 91 MMOL/L — LOW (ref 96–108)
CHLORIDE SERPL-SCNC: 93 MMOL/L — LOW (ref 96–108)
CO2 SERPL-SCNC: 22 MMOL/L — SIGNIFICANT CHANGE UP (ref 22–31)
CO2 SERPL-SCNC: 24 MMOL/L — SIGNIFICANT CHANGE UP (ref 22–31)
CO2 SERPL-SCNC: 25 MMOL/L — SIGNIFICANT CHANGE UP (ref 22–31)
CO2 SERPL-SCNC: 25 MMOL/L — SIGNIFICANT CHANGE UP (ref 22–31)
CREAT ?TM UR-MCNC: 41 MG/DL — SIGNIFICANT CHANGE UP
CREAT SERPL-MCNC: 0.59 MG/DL — SIGNIFICANT CHANGE UP (ref 0.5–1.3)
CREAT SERPL-MCNC: 0.67 MG/DL — SIGNIFICANT CHANGE UP (ref 0.5–1.3)
CREAT SERPL-MCNC: 0.74 MG/DL — SIGNIFICANT CHANGE UP (ref 0.5–1.3)
CREAT SERPL-MCNC: 0.78 MG/DL — SIGNIFICANT CHANGE UP (ref 0.5–1.3)
GLUCOSE SERPL-MCNC: 107 MG/DL — HIGH (ref 70–99)
GLUCOSE SERPL-MCNC: 145 MG/DL — HIGH (ref 70–99)
GLUCOSE SERPL-MCNC: 99 MG/DL — SIGNIFICANT CHANGE UP (ref 70–99)
GLUCOSE SERPL-MCNC: 99 MG/DL — SIGNIFICANT CHANGE UP (ref 70–99)
HAV IGM SER-ACNC: SIGNIFICANT CHANGE UP
HBV CORE IGM SER-ACNC: SIGNIFICANT CHANGE UP
HBV SURFACE AG SER-ACNC: SIGNIFICANT CHANGE UP
HCT VFR BLD CALC: 38.3 % — LOW (ref 39–50)
HCV AB S/CO SERPL IA: 14.67 S/CO — SIGNIFICANT CHANGE UP
HCV AB SERPL-IMP: REACTIVE
HGB BLD-MCNC: 13.6 G/DL — SIGNIFICANT CHANGE UP (ref 13–17)
MAGNESIUM SERPL-MCNC: 1.9 MG/DL — SIGNIFICANT CHANGE UP (ref 1.6–2.6)
MAGNESIUM SERPL-MCNC: 2 MG/DL — SIGNIFICANT CHANGE UP (ref 1.6–2.6)
MAGNESIUM SERPL-MCNC: 2.1 MG/DL — SIGNIFICANT CHANGE UP (ref 1.6–2.6)
MCHC RBC-ENTMCNC: 32.5 PG — SIGNIFICANT CHANGE UP (ref 27–34)
MCHC RBC-ENTMCNC: 35.5 G/DL — SIGNIFICANT CHANGE UP (ref 32–36)
MCV RBC AUTO: 91.6 FL — SIGNIFICANT CHANGE UP (ref 80–100)
OSMOLALITY SERPL: 264 MOSM/KG — LOW (ref 280–301)
OSMOLALITY UR: 462 MOSMOL/KG — SIGNIFICANT CHANGE UP (ref 100–650)
PLATELET # BLD AUTO: 211 K/UL — SIGNIFICANT CHANGE UP (ref 150–400)
POTASSIUM SERPL-MCNC: 4.2 MMOL/L — SIGNIFICANT CHANGE UP (ref 3.5–5.3)
POTASSIUM SERPL-MCNC: 4.2 MMOL/L — SIGNIFICANT CHANGE UP (ref 3.5–5.3)
POTASSIUM SERPL-MCNC: 4.5 MMOL/L — SIGNIFICANT CHANGE UP (ref 3.5–5.3)
POTASSIUM SERPL-MCNC: 5.1 MMOL/L — SIGNIFICANT CHANGE UP (ref 3.5–5.3)
POTASSIUM SERPL-SCNC: 4.2 MMOL/L — SIGNIFICANT CHANGE UP (ref 3.5–5.3)
POTASSIUM SERPL-SCNC: 4.2 MMOL/L — SIGNIFICANT CHANGE UP (ref 3.5–5.3)
POTASSIUM SERPL-SCNC: 4.5 MMOL/L — SIGNIFICANT CHANGE UP (ref 3.5–5.3)
POTASSIUM SERPL-SCNC: 5.1 MMOL/L — SIGNIFICANT CHANGE UP (ref 3.5–5.3)
PROT SERPL-MCNC: 6.6 G/DL — SIGNIFICANT CHANGE UP (ref 6–8.3)
RBC # BLD: 4.18 M/UL — LOW (ref 4.2–5.8)
RBC # FLD: 12.3 % — SIGNIFICANT CHANGE UP (ref 10.3–16.9)
SODIUM SERPL-SCNC: 125 MMOL/L — LOW (ref 135–145)
SODIUM SERPL-SCNC: 126 MMOL/L — LOW (ref 135–145)
SODIUM SERPL-SCNC: 128 MMOL/L — LOW (ref 135–145)
SODIUM SERPL-SCNC: 128 MMOL/L — LOW (ref 135–145)
SODIUM UR-SCNC: 102 MMOL/L — SIGNIFICANT CHANGE UP
UUN UR-MCNC: 434 MG/DL — SIGNIFICANT CHANGE UP
WBC # BLD: 7.1 K/UL — SIGNIFICANT CHANGE UP (ref 3.8–10.5)
WBC # FLD AUTO: 7.1 K/UL — SIGNIFICANT CHANGE UP (ref 3.8–10.5)

## 2018-05-06 PROCEDURE — 99232 SBSQ HOSP IP/OBS MODERATE 35: CPT

## 2018-05-06 RX ORDER — QUETIAPINE FUMARATE 200 MG/1
25 TABLET, FILM COATED ORAL ONCE
Qty: 0 | Refills: 0 | Status: COMPLETED | OUTPATIENT
Start: 2018-05-06 | End: 2018-05-06

## 2018-05-06 RX ORDER — QUETIAPINE FUMARATE 200 MG/1
12.5 TABLET, FILM COATED ORAL ONCE
Qty: 0 | Refills: 0 | Status: COMPLETED | OUTPATIENT
Start: 2018-05-06 | End: 2018-05-06

## 2018-05-06 RX ORDER — LANOLIN ALCOHOL/MO/W.PET/CERES
5 CREAM (GRAM) TOPICAL ONCE
Qty: 0 | Refills: 0 | Status: COMPLETED | OUTPATIENT
Start: 2018-05-06 | End: 2018-05-06

## 2018-05-06 RX ORDER — SODIUM CHLORIDE 9 MG/ML
1000 INJECTION INTRAMUSCULAR; INTRAVENOUS; SUBCUTANEOUS
Qty: 0 | Refills: 0 | Status: DISCONTINUED | OUTPATIENT
Start: 2018-05-06 | End: 2018-05-06

## 2018-05-06 RX ADMIN — SODIUM CHLORIDE 80 MILLILITER(S): 9 INJECTION INTRAMUSCULAR; INTRAVENOUS; SUBCUTANEOUS at 10:05

## 2018-05-06 RX ADMIN — QUETIAPINE FUMARATE 25 MILLIGRAM(S): 200 TABLET, FILM COATED ORAL at 23:48

## 2018-05-06 RX ADMIN — Medication 5 MILLIGRAM(S): at 01:54

## 2018-05-06 NOTE — DIETITIAN INITIAL EVALUATION ADULT. - PROBLEM SELECTOR PLAN 2
Suicide attempt as above with left wrist injury. S/p suturing by plastic surgery Dr. Jo with severe tendon damage. No pain.   -f/u plastics Dr. Jo  -Serial examinations of left wrist/ fingers  -f/u Psych: 1:1 constant observation, and transfer once medically cleared.

## 2018-05-06 NOTE — DIETITIAN INITIAL EVALUATION ADULT. - PROBLEM SELECTOR PLAN 1
Likely hypovolemic hyponatremia s/p 2 liters. Serum osmolality low, increased urine osmolality. Small improvement of 121-122 after 2 liters of fluids. Patient asymptomatic, increasing likelyhood of non-acute hyponatremia.   -c/w NS 150cc/hr   -BMP 10PM  -Avoid over-correction, no more than 6-8 mEq over 24 hours.

## 2018-05-06 NOTE — DIETITIAN INITIAL EVALUATION ADULT. - PROBLEM SELECTOR PLAN 4
s/p Suicide attempt with multiple stressors as above. Negative urine tox, salicylate level,   -f/u psychiatry: Transfer to -[email or web address] once sodium improves and bed available.

## 2018-05-06 NOTE — DIETITIAN INITIAL EVALUATION ADULT. - OTHER INFO
57y/o M admitted s/p suicide attempt and with hyponatremia. Pt seen asleep in bed with 1:1 sitter at bedside. Pt does not awaken to verbal stimuli. Per CNA, pt with fair appetite this am. ~50% consumed. No apparent GI distress and mechanical issues noted. Per H&P pt with poor po over the last 1-2weeks. Monitor intake and provide ensure enlive BID if intake <50%. RD to follow per policy and re-attempt interview as appropriate.

## 2018-05-06 NOTE — DIETITIAN INITIAL EVALUATION ADULT. - PROBLEM SELECTOR PLAN 5
Mild transaminiits on admission labs.   -Hepatitis Panel in AM, can be followed up outpatient.  -CMP in AM

## 2018-05-06 NOTE — PROGRESS NOTE ADULT - SUBJECTIVE AND OBJECTIVE BOX
Patient is a 58y old  Male who presents with a chief complaint of Suicide attempt. (05 May 2018 21:29)      INTERVAL HPI/OVERNIGHT EVENTS:    Review of Systems: 12 point review of systems otherwise negative  ( - )fevers/chills  ( - ) dyspnea  ( - ) cough  ( - ) chest pain  ( - ) palpatations  ( - ) dizziness/lightheadedness  ( - ) nausea/vomiting  ( - ) abd pain  ( - ) diarrhea  ( - ) melena  ( - ) hematochezia  ( - ) dysuria  ( - ) hematuria  ( - ) leg swelling  ( -) calf tenderness  ( - ) motor weakness  ( - ) extremity numbness  ( - ) back pain  ( + ) tolerating POs  ( + ) BM    MEDICATIONS  (STANDING):    MEDICATIONS  (PRN):  acetaminophen   Tablet. 650 milliGRAM(s) Oral every 6 hours PRN Mild Pain (1 - 3)      Allergies    No Known Allergies    Intolerances          Vital Signs Last 24 Hrs  T(C): 36.8 (06 May 2018 08:29), Max: 36.9 (05 May 2018 21:30)  T(F): 98.3 (06 May 2018 08:29), Max: 98.4 (05 May 2018 21:30)  HR: 71 (06 May 2018 08:29) (65 - 71)  BP: 108/66 (06 May 2018 08:29) (108/66 - 145/82)  BP(mean): --  RR: 18 (06 May 2018 08:29) (18 - 19)  SpO2: 97% (06 May 2018 08:29) (96% - 98%)  CAPILLARY BLOOD GLUCOSE          - @ 07:01  -  - @ 07:00  --------------------------------------------------------  IN: 300 mL / OUT: 0 mL / NET: 300 mL        Physical Exam:    Daily Height in cm: 185.42 (05 May 2018 21:30)    Daily Weight in k.9 (06 May 2018 13:15)  General:  Well appearing, NAD, not cachetic  HEENT:  Nonicteric, PERRLA  CV:  RRR, no murmur, no JVD  Lungs:  CTA B/L, no wheezes, rales, rhonchi  Abdomen:  Soft, non-tender, no distended, positive BS, no hepatosplenomegaly  Extremities:  2+ pulses, no c/c, no edema  Skin:  Warm and dry, no rashes  :  No maddox  Neuro:  AAOx3, non-focal, CN II-XII grossly intact  No Restraints    LABS:                        13.6   7.1   )-----------( 211      ( 06 May 2018 08:04 )             38.3     -    128<L>  |  93<L>  |  12  ----------------------------<  107<H>  4.5   |  22  |  0.59    Ca    8.6      06 May 2018 12:14  Mg     2.0         TPro  6.6  /  Alb  3.8  /  TBili  0.7  /  DBili  x   /  AST  41<H>  /  ALT  40  /  AlkPhos  35<L>        Urinalysis Basic - ( 05 May 2018 17:39 )    Color: Yellow / Appearance: Clear / S.015 / pH: x  Gluc: x / Ketone: 15 mg/dL  / Bili: Negative / Urobili: 0.2 E.U./dL   Blood: x / Protein: Trace mg/dL / Nitrite: NEGATIVE   Leuk Esterase: NEGATIVE / RBC: 5-10 /HPF / WBC < 5 /HPF   Sq Epi: x / Non Sq Epi: 0-5 /HPF / Bacteria: Present /HPF          RADIOLOGY & ADDITIONAL TESTS:    ---------------------------------------------------------------------------  I personally reviewed: [  ]EKG   [  ]CXR    [  ] CT    [  ]Other  ---------------------------------------------------------------------------  PLEASE CHECK WHEN PRESENT:     [  ]Heart Failure     [  ] Acute     [  ] Acute on Chronic     [  ] Chronic  -------------------------------------------------------------------     [  ]Diastolic [HFpEF]     [  ]Systolic [HFrEF]     [  ]Combined [HFpEF & HFrEF]     [  ]Other:  -------------------------------------------------------------------  [  ]KIRSTEN     [  ]ATN     [  ]Reneal Medullary Necrosis     [  ]Renal Cortical Necrosis     [  ]Other Pathological Lesions:    [  ]CKD 1  [  ]CKD 2  [  ]CKD 3  [  ]CKD 4  [  ]CKD 5  [  ]Other  -------------------------------------------------------------------  [  ]Other/Unspecified:    --------------------------------------------------------------------    Abdominal Nutritional Status  [  ]Malnutrition: See Nutrition Note  [  ]Cachexia  [  ]Other:   [  ]Supplement Ordered:  [  ]Morbid Obesity (BMI >=40]

## 2018-05-06 NOTE — PROGRESS NOTE BEHAVIORAL HEALTH - NSBHFUPINTERVALHXFT_PSY_A_CORE
Patient seen on unit after admission for hyponatremia. Patient with 1:1 present, wife at bedside, primary team rounding on patient. On interview, patient reports feeling like he is fine presently. He denies SI/HI/AVH. He acknowledges the events leading up to ED presentation and understands that he will be transferred to psychiatric unit once medically cleared pending bed availability. Wife states that it is "a good idea" that he be transferred, though he is ambivalent about signing in voluntarily.

## 2018-05-07 ENCOUNTER — TRANSCRIPTION ENCOUNTER (OUTPATIENT)
Age: 58
End: 2018-05-07

## 2018-05-07 DIAGNOSIS — F32.9 MAJOR DEPRESSIVE DISORDER, SINGLE EPISODE, UNSPECIFIED: ICD-10-CM

## 2018-05-07 LAB
ANION GAP SERPL CALC-SCNC: 11 MMOL/L — SIGNIFICANT CHANGE UP (ref 5–17)
ANION GAP SERPL CALC-SCNC: 11 MMOL/L — SIGNIFICANT CHANGE UP (ref 5–17)
ANION GAP SERPL CALC-SCNC: 13 MMOL/L — SIGNIFICANT CHANGE UP (ref 5–17)
ANION GAP SERPL CALC-SCNC: 14 MMOL/L — SIGNIFICANT CHANGE UP (ref 5–17)
APPEARANCE UR: CLEAR — SIGNIFICANT CHANGE UP
BASOPHILS NFR BLD AUTO: 0.2 % — SIGNIFICANT CHANGE UP (ref 0–2)
BILIRUB UR-MCNC: NEGATIVE — SIGNIFICANT CHANGE UP
BUN SERPL-MCNC: 11 MG/DL — SIGNIFICANT CHANGE UP (ref 7–23)
BUN SERPL-MCNC: 13 MG/DL — SIGNIFICANT CHANGE UP (ref 7–23)
BUN SERPL-MCNC: 14 MG/DL — SIGNIFICANT CHANGE UP (ref 7–23)
BUN SERPL-MCNC: 9 MG/DL — SIGNIFICANT CHANGE UP (ref 7–23)
CALCIUM SERPL-MCNC: 8.6 MG/DL — SIGNIFICANT CHANGE UP (ref 8.4–10.5)
CALCIUM SERPL-MCNC: 8.6 MG/DL — SIGNIFICANT CHANGE UP (ref 8.4–10.5)
CALCIUM SERPL-MCNC: 8.8 MG/DL — SIGNIFICANT CHANGE UP (ref 8.4–10.5)
CALCIUM SERPL-MCNC: 8.8 MG/DL — SIGNIFICANT CHANGE UP (ref 8.4–10.5)
CALCIUM SERPL-MCNC: 8.9 MG/DL — SIGNIFICANT CHANGE UP (ref 8.4–10.5)
CALCIUM SERPL-MCNC: 9 MG/DL — SIGNIFICANT CHANGE UP (ref 8.4–10.5)
CHLORIDE SERPL-SCNC: 88 MMOL/L — LOW (ref 96–108)
CHLORIDE SERPL-SCNC: 89 MMOL/L — LOW (ref 96–108)
CHLORIDE SERPL-SCNC: 89 MMOL/L — LOW (ref 96–108)
CHLORIDE SERPL-SCNC: 92 MMOL/L — LOW (ref 96–108)
CHLORIDE SERPL-SCNC: 92 MMOL/L — LOW (ref 96–108)
CHLORIDE SERPL-SCNC: 94 MMOL/L — LOW (ref 96–108)
CHOLEST SERPL-MCNC: 138 MG/DL — SIGNIFICANT CHANGE UP (ref 10–199)
CO2 SERPL-SCNC: 20 MMOL/L — LOW (ref 22–31)
CO2 SERPL-SCNC: 21 MMOL/L — LOW (ref 22–31)
CO2 SERPL-SCNC: 24 MMOL/L — SIGNIFICANT CHANGE UP (ref 22–31)
CO2 SERPL-SCNC: 24 MMOL/L — SIGNIFICANT CHANGE UP (ref 22–31)
CO2 SERPL-SCNC: 26 MMOL/L — SIGNIFICANT CHANGE UP (ref 22–31)
CO2 SERPL-SCNC: 26 MMOL/L — SIGNIFICANT CHANGE UP (ref 22–31)
COLOR SPEC: YELLOW — SIGNIFICANT CHANGE UP
CREAT ?TM UR-MCNC: 65 MG/DL — SIGNIFICANT CHANGE UP
CREAT SERPL-MCNC: 0.67 MG/DL — SIGNIFICANT CHANGE UP (ref 0.5–1.3)
CREAT SERPL-MCNC: 0.68 MG/DL — SIGNIFICANT CHANGE UP (ref 0.5–1.3)
CREAT SERPL-MCNC: 0.7 MG/DL — SIGNIFICANT CHANGE UP (ref 0.5–1.3)
CREAT SERPL-MCNC: 0.72 MG/DL — SIGNIFICANT CHANGE UP (ref 0.5–1.3)
CREAT SERPL-MCNC: 0.74 MG/DL — SIGNIFICANT CHANGE UP (ref 0.5–1.3)
CREAT SERPL-MCNC: 0.78 MG/DL — SIGNIFICANT CHANGE UP (ref 0.5–1.3)
DIFF PNL FLD: (no result)
EOSINOPHIL NFR BLD AUTO: 2.4 % — SIGNIFICANT CHANGE UP (ref 0–6)
GLUCOSE SERPL-MCNC: 103 MG/DL — HIGH (ref 70–99)
GLUCOSE SERPL-MCNC: 121 MG/DL — HIGH (ref 70–99)
GLUCOSE SERPL-MCNC: 129 MG/DL — HIGH (ref 70–99)
GLUCOSE SERPL-MCNC: 149 MG/DL — HIGH (ref 70–99)
GLUCOSE SERPL-MCNC: 96 MG/DL — SIGNIFICANT CHANGE UP (ref 70–99)
GLUCOSE SERPL-MCNC: 97 MG/DL — SIGNIFICANT CHANGE UP (ref 70–99)
GLUCOSE UR QL: NEGATIVE — SIGNIFICANT CHANGE UP
HBA1C BLD-MCNC: 5.7 % — HIGH (ref 4–5.6)
HCT VFR BLD CALC: 40.1 % — SIGNIFICANT CHANGE UP (ref 39–50)
HDLC SERPL-MCNC: 62 MG/DL — SIGNIFICANT CHANGE UP (ref 40–125)
HGB BLD-MCNC: 14.2 G/DL — SIGNIFICANT CHANGE UP (ref 13–17)
KETONES UR-MCNC: NEGATIVE — SIGNIFICANT CHANGE UP
LEUKOCYTE ESTERASE UR-ACNC: NEGATIVE — SIGNIFICANT CHANGE UP
LIPID PNL WITH DIRECT LDL SERPL: 67 MG/DL — SIGNIFICANT CHANGE UP
LYMPHOCYTES # BLD AUTO: 15.6 % — SIGNIFICANT CHANGE UP (ref 13–44)
MAGNESIUM SERPL-MCNC: 2 MG/DL — SIGNIFICANT CHANGE UP (ref 1.6–2.6)
MAGNESIUM SERPL-MCNC: 2.1 MG/DL — SIGNIFICANT CHANGE UP (ref 1.6–2.6)
MCHC RBC-ENTMCNC: 32.8 PG — SIGNIFICANT CHANGE UP (ref 27–34)
MCHC RBC-ENTMCNC: 35.4 G/DL — SIGNIFICANT CHANGE UP (ref 32–36)
MCV RBC AUTO: 92.6 FL — SIGNIFICANT CHANGE UP (ref 80–100)
MONOCYTES NFR BLD AUTO: 13.6 % — SIGNIFICANT CHANGE UP (ref 2–14)
NEUTROPHILS NFR BLD AUTO: 68.2 % — SIGNIFICANT CHANGE UP (ref 43–77)
NITRITE UR-MCNC: NEGATIVE — SIGNIFICANT CHANGE UP
OSMOLALITY SERPL: 274 MOSM/KG — LOW (ref 280–301)
OSMOLALITY UR: 376 MOSMOL/KG — SIGNIFICANT CHANGE UP (ref 100–650)
PH UR: 6 — SIGNIFICANT CHANGE UP (ref 5–8)
PHOSPHATE SERPL-MCNC: 3.7 MG/DL — SIGNIFICANT CHANGE UP (ref 2.5–4.5)
PLATELET # BLD AUTO: 227 K/UL — SIGNIFICANT CHANGE UP (ref 150–400)
POTASSIUM SERPL-MCNC: 4 MMOL/L — SIGNIFICANT CHANGE UP (ref 3.5–5.3)
POTASSIUM SERPL-MCNC: 4.1 MMOL/L — SIGNIFICANT CHANGE UP (ref 3.5–5.3)
POTASSIUM SERPL-MCNC: 4.2 MMOL/L — SIGNIFICANT CHANGE UP (ref 3.5–5.3)
POTASSIUM SERPL-MCNC: 4.3 MMOL/L — SIGNIFICANT CHANGE UP (ref 3.5–5.3)
POTASSIUM SERPL-MCNC: 4.4 MMOL/L — SIGNIFICANT CHANGE UP (ref 3.5–5.3)
POTASSIUM SERPL-MCNC: 4.7 MMOL/L — SIGNIFICANT CHANGE UP (ref 3.5–5.3)
POTASSIUM SERPL-SCNC: 4 MMOL/L — SIGNIFICANT CHANGE UP (ref 3.5–5.3)
POTASSIUM SERPL-SCNC: 4.1 MMOL/L — SIGNIFICANT CHANGE UP (ref 3.5–5.3)
POTASSIUM SERPL-SCNC: 4.2 MMOL/L — SIGNIFICANT CHANGE UP (ref 3.5–5.3)
POTASSIUM SERPL-SCNC: 4.3 MMOL/L — SIGNIFICANT CHANGE UP (ref 3.5–5.3)
POTASSIUM SERPL-SCNC: 4.4 MMOL/L — SIGNIFICANT CHANGE UP (ref 3.5–5.3)
POTASSIUM SERPL-SCNC: 4.7 MMOL/L — SIGNIFICANT CHANGE UP (ref 3.5–5.3)
PROT UR-MCNC: NEGATIVE MG/DL — SIGNIFICANT CHANGE UP
RBC # BLD: 4.33 M/UL — SIGNIFICANT CHANGE UP (ref 4.2–5.8)
RBC # FLD: 12.4 % — SIGNIFICANT CHANGE UP (ref 10.3–16.9)
SODIUM SERPL-SCNC: 122 MMOL/L — LOW (ref 135–145)
SODIUM SERPL-SCNC: 123 MMOL/L — LOW (ref 135–145)
SODIUM SERPL-SCNC: 124 MMOL/L — LOW (ref 135–145)
SODIUM SERPL-SCNC: 129 MMOL/L — LOW (ref 135–145)
SODIUM SERPL-SCNC: 131 MMOL/L — LOW (ref 135–145)
SODIUM SERPL-SCNC: 131 MMOL/L — LOW (ref 135–145)
SODIUM UR-SCNC: 23 MMOL/L — SIGNIFICANT CHANGE UP
SP GR SPEC: 1.01 — SIGNIFICANT CHANGE UP (ref 1–1.03)
TOTAL CHOLESTEROL/HDL RATIO MEASUREMENT: 2.2 RATIO — LOW (ref 3.4–9.6)
TRIGL SERPL-MCNC: 43 MG/DL — SIGNIFICANT CHANGE UP (ref 10–149)
UROBILINOGEN FLD QL: 0.2 E.U./DL — SIGNIFICANT CHANGE UP
UUN UR-MCNC: 570 MG/DL — SIGNIFICANT CHANGE UP
WBC # BLD: 5.7 K/UL — SIGNIFICANT CHANGE UP (ref 3.8–10.5)
WBC # FLD AUTO: 5.7 K/UL — SIGNIFICANT CHANGE UP (ref 3.8–10.5)

## 2018-05-07 PROCEDURE — 99233 SBSQ HOSP IP/OBS HIGH 50: CPT

## 2018-05-07 PROCEDURE — 99233 SBSQ HOSP IP/OBS HIGH 50: CPT | Mod: GC

## 2018-05-07 RX ORDER — SODIUM CHLORIDE 9 MG/ML
1000 INJECTION INTRAMUSCULAR; INTRAVENOUS; SUBCUTANEOUS
Qty: 0 | Refills: 0 | Status: DISCONTINUED | OUTPATIENT
Start: 2018-05-07 | End: 2018-05-07

## 2018-05-07 RX ORDER — LANOLIN ALCOHOL/MO/W.PET/CERES
5 CREAM (GRAM) TOPICAL ONCE
Qty: 0 | Refills: 0 | Status: COMPLETED | OUTPATIENT
Start: 2018-05-07 | End: 2018-05-07

## 2018-05-07 RX ORDER — SODIUM CHLORIDE 9 MG/ML
1000 INJECTION, SOLUTION INTRAVENOUS
Qty: 0 | Refills: 0 | Status: DISCONTINUED | OUTPATIENT
Start: 2018-05-07 | End: 2018-05-07

## 2018-05-07 RX ADMIN — SODIUM CHLORIDE 50 MILLILITER(S): 9 INJECTION INTRAMUSCULAR; INTRAVENOUS; SUBCUTANEOUS at 08:52

## 2018-05-07 RX ADMIN — Medication 5 MILLIGRAM(S): at 23:49

## 2018-05-07 NOTE — DISCHARGE NOTE ADULT - MEDICATION SUMMARY - MEDICATIONS TO TAKE
I will START or STAY ON the medications listed below when I get home from the hospital:  None I will START or STAY ON the medications listed below when I get home from the hospital:    clotrimazole 1% topical cream  -- 1 application on skin 2 times a day  -- Indication: For Intertrigo

## 2018-05-07 NOTE — DISCHARGE NOTE ADULT - PLAN OF CARE
Please eat and drink adequately You were found to have low sodium level (121) in your blood work upon admission. It was likely secondary to decreased oral intake. Your sodium level has currently improved (129) after fluid resuscitation. Please continue to eat and drink adequately. Please follow-up with your outpatient provider and have electrolytes checked upon discharge. Continue outpatient therapy Continue care with Psychiatry You are tested positive Hepatitis C antibody: it means you are either currently infected or previously infected. Serum hepatitis RNA is pending to see if you have active infection. Follow-up outpatient provider for further work-up. Please follow-up with Plastic surgery to take care of your wrist laceration. 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 It is caused by abnormal ADH hormone production. Your sodium level has currently improved (129) after fluid restriction. Please follow-up with your outpatient provider and have electrolytes checked upon discharge. Please follow-up with Plastic surgery (Dr. Jo) in two weeks to evaluate your wrist laceration. You are tested positive for Hepatitis C infection. Please follow-up with Dr. Hernández in the outpatient setting for further management. 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 It is caused by abnormal ADH hormone production. Your sodium level has currently improved (130) after fluid restriction. Please follow-up with your outpatient provider and have electrolytes checked upon discharge. 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 It is caused by abnormal ADH hormone production. Your sodium level has currently improved (130) after fluid restriction. Please follow-up with your outpatient provider and have electrolytes checked upon discharge. Renal service will follow you at [facility]. Please continue clotrimazole cream twice daily to the left inguinal area.

## 2018-05-07 NOTE — PROGRESS NOTE BEHAVIORAL HEALTH - NSBHFUPINTERVALHXFT_PSY_A_CORE
Patient seen at bedside, 1:1 present.  Repeat Na 129 at 12pm, not medically cleared for transfer yet.  Patient pacing slightly in room, somewhat intrudes on my personal space during interview.  Says he is getting tired of being cooped up in hospital room.  Patient reports he regrets suicide attempt.  Says several times "I should have come to the hospital for help."  Reports he was feeling depressed due to regretting other decisions in his life.  Reports possible CAH at the time of the attempt but then describes them more as ruminative thoughts.  Reports remote history of antidepressant medication but cannot remember names and does not believe trials were effective.  Reports "many" psychiatric diagnoses earlier in his life when he was at a intermediate house.  Reports last drink was in 1995.  Was sleeping poorly, says Seroquel last night was effective.  Denies current SI, AH.    Transfer Note:  The patient is a 58-year old man with a past history of alcohol use disorder in remission, likely depressive illness, not currently in outpatient care, who presented to ED on 5/5 after suicide attempt via cutting wrist and was admitted to medicine for hyponatremia.  He reports regretting suicide attempt and denies current SI but continues to experience depressed mood, ruminative thoughts, feelings of guilt.  He presents at an acute risk of danger to self given recent attempt and requires inpatient psychiatric admission when medically cleared.  Na 121 on admission, 131 this morning but 129 at 12pm.  Pending repeat Na at 4pm. Patient seen at bedside, 1:1 present.  Repeat Na 129 at 12pm, not medically cleared for transfer yet.  Patient pacing slightly in room, somewhat intrudes on my personal space during interview.  Says he is getting tired of being cooped up in hospital room.  Patient reports he regrets suicide attempt.  Says several times "I should have come to the hospital for help."  Reports he was feeling depressed due to regretting other decisions in his life.  Reports possible CAH at the time of the attempt but then describes them more as ruminative thoughts.  Reports remote history of antidepressant medication but cannot remember names and does not believe trials were effective.  Reports "many" psychiatric diagnoses earlier in his life when he was at a nursing home house.  Reports last drink was in 1995.  Was sleeping poorly, says Seroquel last night was effective.  Denies current SI, AH.    Transfer Note:  The patient is a 58-year old man with a past history of alcohol use disorder in remission, likely depressive illness, not currently in outpatient care, who presented to ED on 5/5 after suicide attempt via cutting wrist and was admitted to medicine for hyponatremia.  He reports regretting suicide attempt and denies current SI but continues to experience depressed mood, ruminative thoughts, feelings of guilt.  He presents at an acute risk of danger to self given recent attempt and requires inpatient psychiatric admission when medically cleared.  Na 121 on admission, 131 this morning but 129 at 12pm.  Pending repeat Na at 4pm.  Plastic Surgeon Dr. Jo sutured and splinted the patient's left wrist, as he had suffered a severe tendon injury with median nerve damage.  Dr. Jo will follow [phone]; patient will require surgery within next two weeks.

## 2018-05-07 NOTE — DISCHARGE NOTE ADULT - SECONDARY DIAGNOSIS.
Alcohol abuse, in remission Suicidal ideation Transaminitis Wrist laceration, left, initial encounter Depression Hepatitis C virus infection without hepatic coma, unspecified chronicity Intertrigo

## 2018-05-07 NOTE — PROGRESS NOTE ADULT - PROBLEM SELECTOR PLAN 1
Likely hypovolemic hyponatremia s/p 2 liters. Serum osmolality low, increased urine osmolality. Small improvement of 121-122 after 2 liters of fluids. Patient asymptomatic, increasing likelyhood of non-acute hyponatremia.   -c/w NS 120cc/hr   -f/u BMP  -Avoid over-correction, no more than 6-8 mEq over 24 hours.

## 2018-05-07 NOTE — CONSULT NOTE ADULT - ATTENDING COMMENTS
UrNa/Osm inconsistent, but do consistently suggest ADH presence. Pt drinking 3-4L fluids/day on own admission, need fluid restriction <1.5L and accurate UO documentaion. TFTs, cortisol. Repeat UrNa/Osm in am. Suggest stopping IVF to see how he levels out without saline.

## 2018-05-07 NOTE — CONSULT NOTE ADULT - PROBLEM SELECTOR RECOMMENDATION 9
Patient is a 58 year old male with hyposmolar hyponatremia. Initially patient appeared to be dehydrated with a low urine sodium and high urine osmolality. Patient's sodium did improve with after initially receiving the NS. Then it appears patient developed an ADH effect which was apparent with the second set of urine lytes with high urine sodium and high urine osmolality. Patient's fluids were stopped at the time and patients. and sodium did improve to 131. Now patients sodium dropped to 122 Patient does admit to drinking large quantities of fluid and patient was also started on NS earlier today. This could have been because patient was still having an ADH effect. now the repeat urine sodium is low and osmolality is decreasing, would anticipate patient will start correcting on his own and ADH effect is wearing off. Unclear what caused patient's ADH effect, possibly due to seroquel vs due to pain?     P - would recommend to fluid restrict patient to 1L   please check labs q4h   patient had a sodium of 131 today and the drop to 122 was an acute drop.  goal sodium would be 131 by 6 am Patient is a 58 year old male with hyposmolar hyponatremia. Initially patient appeared to be dehydrated with a low urine sodium and high urine osmolality. Patient's sodium did improve with after initially receiving the NS. Then it appears patient developed an ADH effect which was apparent with the second set of urine lytes with high urine sodium and high urine osmolality. Patient's fluids were stopped at the time and patients. and sodium did improve to 131. Now patients sodium dropped to 122 Patient does admit to drinking large quantities of fluid and patient was also started on NS earlier today. This could have been because patient was still having an ADH effect. now the repeat urine sodium is low and osmolality is decreasing, would anticipate patient will start correcting on his own and ADH effect is wearing off. Unclear what caused patient's ADH effect, possibly due to seroquel vs due to pain?     P - would recommend to fluid restrict patient   please check labs q4h   patient had a sodium of 131 today and the drop to 122 was an acute drop.  goal sodium would be 131 by 6 am

## 2018-05-07 NOTE — DISCHARGE NOTE ADULT - PATIENT PORTAL LINK FT
You can access the [facility] Patient Portal, offered by [facility], by registering with the following website: [email or web address]

## 2018-05-07 NOTE — PROGRESS NOTE BEHAVIORAL HEALTH - NSBHATTESTSEENBY_PSY_A_CORE
Trainee with telephonic supervision from Attending Psychiatrist attending Psychiatrist without NP/Trainee

## 2018-05-07 NOTE — PROGRESS NOTE ADULT - PROBLEM SELECTOR PLAN 6
F: 120 cc/hr NS  E: replete PRN  N: regular diet  No HSQ needed  Full Code  Dispo: Medicine then psychiatry

## 2018-05-07 NOTE — PROGRESS NOTE ADULT - SUBJECTIVE AND OBJECTIVE BOX
INTERVAL HPI/OVERNIGHT EVENTS: NETO    SUBJECTIVE: Patient seen and examined at bedside. No suicidal ideations, patient is pondering why he tried to harm himself. No fever, chill, n/v or diarrhea.      OBJECTIVE:    VITAL SIGNS:  ICU Vital Signs Last 24 Hrs  T(C): 36.8 (07 May 2018 08:58), Max: 37.1 (06 May 2018 15:36)  T(F): 98.2 (07 May 2018 08:58), Max: 98.7 (06 May 2018 15:36)  HR: 75 (07 May 2018 08:58) (71 - 90)  BP: 114/74 (07 May 2018 08:58) (113/66 - 134/70)  BP(mean): --  ABP: --  ABP(mean): --  RR: 16 (07 May 2018 08:58) (16 - 19)  SpO2: 96% (07 May 2018 08:58) (95% - 96%)        CAPILLARY BLOOD GLUCOSE          PHYSICAL EXAM:    General: NAD  HEENT: NC/AT; PERRL, clear conjunctiva  Neck: supple  Respiratory: CTA b/l  Cardiovascular: +S1/S2; RRR  Abdomen: soft, NT/ND; +BS x4  Extremities: WWP, 2+ peripheral pulses b/l; no LE edema  Skin: normal color and turgor; no rash  Neurological: AAIce3    MEDICATIONS:  MEDICATIONS  (STANDING):  sodium chloride 0.9%. 1000 milliLiter(s) (120 mL/Hr) IV Continuous <Continuous>    MEDICATIONS  (PRN):  acetaminophen   Tablet. 650 milliGRAM(s) Oral every 6 hours PRN Mild Pain (1 - 3)      ALLERGIES:  Allergies    No Known Allergies    Intolerances        LABS:                        14.2   5.7   )-----------( 227      ( 07 May 2018 06:40 )             40.1     05-07    129<L>  |  92<L>  |  13  ----------------------------<  121<H>  4.7   |  24  |  0.68    Ca    9.0      07 May 2018 12:41  Phos  3.7     05-07  Mg     2.0     05-07    TPro  6.6  /  Alb  3.8  /  TBili  0.7  /  DBili  x   /  AST  41<H>  /  ALT  40  /  AlkPhos  35<L>        Urinalysis Basic - ( 05 May 2018 17:39 )    Color: Yellow / Appearance: Clear / S.015 / pH: x  Gluc: x / Ketone: 15 mg/dL  / Bili: Negative / Urobili: 0.2 E.U./dL   Blood: x / Protein: Trace mg/dL / Nitrite: NEGATIVE   Leuk Esterase: NEGATIVE / RBC: 5-10 /HPF / WBC < 5 /HPF   Sq Epi: x / Non Sq Epi: 0-5 /HPF / Bacteria: Present /HPF        RADIOLOGY & ADDITIONAL TESTS: Reviewed. INTERVAL HPI/OVERNIGHT EVENTS: NETO    SUBJECTIVE: Patient seen and examined at bedside. No suicidal ideations, patient is pondering why he tried to harm himself. No fever, chill, n/v or diarrhea.      OBJECTIVE:    VITAL SIGNS:  ICU Vital Signs Last 24 Hrs  T(C): 36.8 (07 May 2018 08:58), Max: 37.1 (06 May 2018 15:36)  T(F): 98.2 (07 May 2018 08:58), Max: 98.7 (06 May 2018 15:36)  HR: 75 (07 May 2018 08:58) (71 - 90)  BP: 114/74 (07 May 2018 08:58) (113/66 - 134/70)  BP(mean): --  ABP: --  ABP(mean): --  RR: 16 (07 May 2018 08:58) (16 - 19)  SpO2: 96% (07 May 2018 08:58) (95% - 96%)        CAPILLARY BLOOD GLUCOSE          PHYSICAL EXAM:    General: NAD  HEENT: NC/AT; PERRL, clear conjunctiva  Neck: supple  Respiratory: CTA b/l  Cardiovascular: +S1/S2; RRR  Abdomen: soft, NT/ND; +BS x4  Extremities: WWP, 2+ peripheral pulses b/l; no LE edema  Skin: normal color and turgor; no rash  Neurological: AAOx3. No focal deficits.     MEDICATIONS:  MEDICATIONS  (STANDING):  sodium chloride 0.9%. 1000 milliLiter(s) (120 mL/Hr) IV Continuous <Continuous>    MEDICATIONS  (PRN):  acetaminophen   Tablet. 650 milliGRAM(s) Oral every 6 hours PRN Mild Pain (1 - 3)      ALLERGIES:  Allergies    No Known Allergies    Intolerances        LABS:                        14.2   5.7   )-----------( 227      ( 07 May 2018 06:40 )             40.1     05-07    129<L>  |  92<L>  |  13  ----------------------------<  121<H>  4.7   |  24  |  0.68    Ca    9.0      07 May 2018 12:41  Phos  3.7     05-07  Mg     2.0     05-07    TPro  6.6  /  Alb  3.8  /  TBili  0.7  /  DBili  x   /  AST  41<H>  /  ALT  40  /  AlkPhos  35<L>        Urinalysis Basic - ( 05 May 2018 17:39 )    Color: Yellow / Appearance: Clear / S.015 / pH: x  Gluc: x / Ketone: 15 mg/dL  / Bili: Negative / Urobili: 0.2 E.U./dL   Blood: x / Protein: Trace mg/dL / Nitrite: NEGATIVE   Leuk Esterase: NEGATIVE / RBC: 5-10 /HPF / WBC < 5 /HPF   Sq Epi: x / Non Sq Epi: 0-5 /HPF / Bacteria: Present /HPF        RADIOLOGY & ADDITIONAL TESTS: Reviewed.

## 2018-05-07 NOTE — DISCHARGE NOTE ADULT - HOSPITAL COURSE
58 male with h/o EtOH, sober for 20+ years, self reported hx of depression presented to ED because of suicidal attempt (left wrist laceration), admitted to medicine for hypovolemic hyponatremia (Na 121 on admission). Plastics was consulted in the ED, repaired left wrist laceration. Hyponatremia resolved with IVF. Patient is medically stable to be discharged to [facility] for further psychiatric evaluation. 58 male with h/o EtOH, sober for 20+ years, self reported hx of depression presented to ED because of suicidal attempt (left wrist laceration), admitted to medicine for hypovolemic hyponatremia (Na 121 on admission). Plastics was consulted in the ED, repaired left wrist laceration. Hyponatremia initially improved with IVF and later patient developed SIADH. Renal consulted, currently on 1L fluid restriction. Patient was found to have HCV infection on routine screening. Patient need outpatient follow-up for HCV treatment. Plastic Surgeon Dr. Jo ([phone]) sutured and splinted the patient's left wrist, as he had suffered a severe tendon injury with median nerve damage. Patient will require surgery within next two weeks. Please follow-up with Dr. Jo (plastics) for evaluation of left wrist laceration. Patient is medically stable to be discharged to [facility] for further psychiatric evaluation. 58 male with h/o EtOH, sober for 20+ years, self reported hx of depression presented to ED because of suicidal attempt (left wrist laceration), admitted to medicine for hypovolemic hyponatremia (Na 121 on admission). Plastics was consulted in the ED, repaired left wrist laceration. Hyponatremia initially improved with IVF and later patient developed SIADH. Renal consulted, currently on 1L fluid restriction. Patient was found to have HCV infection on routine screening. Patient need outpatient follow-up for HCV treatment. Plastic Surgeon Dr. Jo ([phone]) sutured and splinted the patient's left wrist, as he had suffered a severe tendon injury with median nerve damage. Patient will require surgery within next two weeks. Please follow-up with Dr. Jo (plastics) for evaluation of left wrist laceration. Med consult will follow patient for coordinating SIADH and HCV infection monitoring. Renal will also follow SIADH at [facility], patient will need daily BMP, serum osmolarity, urine Na, urine osmolarity and urine Cr. Patient was found to have left inguinal intertrigo, clotrimazole cream was started. Please do not discharge pt without calling Dr Jo ([phone], plastic surgeon). Patient is medically stable to be discharged to [facility] for further psychiatric evaluation.
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
## 2018-05-07 NOTE — DISCHARGE NOTE ADULT - CARE PROVIDER_API CALL
Jorge Alberto Jo), Plastic Surgery; Surgery  [address] [place] [address]  Phone: [phone]  Fax: [phone]    Alf Johnson), Internal Medicine  [address]  [address] [place] [address]  Phone: [phone]  Fax: [phone]    Margaux Dick), Psychiatry  [address] [address]  Phone: [phone]  Fax: [phone]

## 2018-05-07 NOTE — CONSULT NOTE ADULT - ASSESSMENT
Patient is a 58 year old male with a history of alcohol abuse but has been sober for 20 years and depression for whom nephrology was consulted for hyponatremia

## 2018-05-07 NOTE — PROGRESS NOTE BEHAVIORAL HEALTH - SUMMARY
The patient is a 58-year old man with a past history of alcohol use disorder in remission, likely depressive illness, not currently in outpatient care, who presented to ED on 5/5 after suicide attempt via cutting wrist and was admitted to medicine for hyponatremia. He is currently experiencing depressed mood, ruminative thoughts, feelings of guilt.  He presents at an acute risk of danger to self given recent attempt and requires inpatient psychiatric admission when medically cleared.  Pending repeat Na at 4pm.

## 2018-05-07 NOTE — CONSULT NOTE ADULT - RS GEN PE MLT RESP DETAILS PC
clear to auscultation bilaterally/normal/airway patent/respirations non-labored/good air movement/breath sounds equal

## 2018-05-07 NOTE — DISCHARGE NOTE ADULT - CARE PLAN
Principal Discharge DX:	Hyponatremia with decreased serum osmolality  Goal:	Please eat and drink adequately  Assessment and plan of treatment:	You were found to have low sodium level (121) in your blood work upon admission. It was likely secondary to decreased oral intake. Your sodium level has currently improved (129) after fluid resuscitation. Please continue to eat and drink adequately. Please follow-up with your outpatient provider and have electrolytes checked upon discharge.  Secondary Diagnosis:	Alcohol abuse, in remission  Goal:	Continue outpatient therapy  Secondary Diagnosis:	Suicidal ideation  Goal:	Continue care with Psychiatry  Secondary Diagnosis:	Transaminitis  Assessment and plan of treatment:	You are tested positive Hepatitis C antibody: it means you are either currently infected or previously infected. Serum hepatitis RNA is pending to see if you have active infection. Follow-up outpatient provider for further work-up.  Secondary Diagnosis:	Wrist laceration, left, initial encounter  Goal:	Please follow-up with Plastic surgery to take care of your wrist laceration.  Secondary Diagnosis:	Depression  Goal:	Continue further care with psychiatry Principal Discharge DX:	SIADH (syndrome of inappropriate ADH production)  Goal:	Please follow-up with your primary care doctor  Assessment and plan of treatment:	You were found to have low sodium level (121) in your blood work upon admission likely from syndrome of inappropriate antidiuretic hormone secretion (SIADH). It is caused by abnormal ADH hormone production. Your sodium level has currently improved (129) after fluid restriction. Please follow-up with your outpatient provider and have electrolytes checked upon discharge.  Secondary Diagnosis:	Alcohol abuse, in remission  Goal:	Continue outpatient therapy  Secondary Diagnosis:	Suicidal ideation  Goal:	Continue care with Psychiatry  Secondary Diagnosis:	Wrist laceration, left, initial encounter  Goal:	Please follow-up with Plastic surgery (Dr. Jo) in two weeks to evaluate your wrist laceration.  Secondary Diagnosis:	Depression  Goal:	Continue further care with psychiatry  Secondary Diagnosis:	Hepatitis C virus infection without hepatic coma, unspecified chronicity  Assessment and plan of treatment:	You are tested positive for Hepatitis C infection. Please follow-up with Dr. Hernández in the outpatient setting for further management. Principal Discharge DX:	SIADH (syndrome of inappropriate ADH production)  Goal:	Please follow-up with your primary care doctor  Assessment and plan of treatment:	You were found to have low sodium level (121) in your blood work upon admission likely from syndrome of inappropriate antidiuretic hormone secretion (SIADH). It is caused by abnormal ADH hormone production. Your sodium level has currently improved (130) after fluid restriction. Please follow-up with your outpatient provider and have electrolytes checked upon discharge.  Secondary Diagnosis:	Alcohol abuse, in remission  Goal:	Continue outpatient therapy  Secondary Diagnosis:	Suicidal ideation  Goal:	Continue care with Psychiatry  Secondary Diagnosis:	Wrist laceration, left, initial encounter  Goal:	Please follow-up with Plastic surgery (Dr. Jo) in two weeks to evaluate your wrist laceration.  Secondary Diagnosis:	Depression  Goal:	Continue further care with psychiatry  Secondary Diagnosis:	Hepatitis C virus infection without hepatic coma, unspecified chronicity  Assessment and plan of treatment:	You are tested positive for Hepatitis C infection. Please follow-up with Dr. Hernández in the outpatient setting for further management. Principal Discharge DX:	SIADH (syndrome of inappropriate ADH production)  Goal:	Please follow-up with your primary care doctor  Assessment and plan of treatment:	You were found to have low sodium level (121) in your blood work upon admission likely from syndrome of inappropriate antidiuretic hormone secretion (SIADH). It is caused by abnormal ADH hormone production. Your sodium level has currently improved (130) after fluid restriction. Please follow-up with your outpatient provider and have electrolytes checked upon discharge. Renal service will follow you at 8 uris.  Secondary Diagnosis:	Alcohol abuse, in remission  Goal:	Continue outpatient therapy  Secondary Diagnosis:	Suicidal ideation  Goal:	Continue care with Psychiatry  Secondary Diagnosis:	Wrist laceration, left, initial encounter  Goal:	Please follow-up with Plastic surgery (Dr. Jo) in two weeks to evaluate your wrist laceration.  Secondary Diagnosis:	Depression  Goal:	Continue further care with psychiatry  Secondary Diagnosis:	Hepatitis C virus infection without hepatic coma, unspecified chronicity  Assessment and plan of treatment:	You are tested positive for Hepatitis C infection. Please follow-up with Dr. Hernández in the outpatient setting for further management. Principal Discharge DX:	SIADH (syndrome of inappropriate ADH production)  Goal:	Please follow-up with your primary care doctor  Assessment and plan of treatment:	You were found to have low sodium level (121) in your blood work upon admission likely from syndrome of inappropriate antidiuretic hormone secretion (SIADH). It is caused by abnormal ADH hormone production. Your sodium level has currently improved (130) after fluid restriction. Please follow-up with your outpatient provider and have electrolytes checked upon discharge. Renal service will follow you at 8 uris.  Secondary Diagnosis:	Alcohol abuse, in remission  Goal:	Continue outpatient therapy  Secondary Diagnosis:	Suicidal ideation  Goal:	Continue care with Psychiatry  Secondary Diagnosis:	Wrist laceration, left, initial encounter  Goal:	Please follow-up with Plastic surgery (Dr. Jo) in two weeks to evaluate your wrist laceration.  Secondary Diagnosis:	Depression  Goal:	Continue further care with psychiatry  Secondary Diagnosis:	Hepatitis C virus infection without hepatic coma, unspecified chronicity  Assessment and plan of treatment:	You are tested positive for Hepatitis C infection. Please follow-up with Dr. Hernández in the outpatient setting for further management.  Secondary Diagnosis:	Intertrigo  Assessment and plan of treatment:	Please continue clotrimazole cream twice daily to the left inguinal area.

## 2018-05-08 LAB
ANION GAP SERPL CALC-SCNC: 11 MMOL/L — SIGNIFICANT CHANGE UP (ref 5–17)
ANION GAP SERPL CALC-SCNC: 11 MMOL/L — SIGNIFICANT CHANGE UP (ref 5–17)
ANION GAP SERPL CALC-SCNC: 12 MMOL/L — SIGNIFICANT CHANGE UP (ref 5–17)
ANION GAP SERPL CALC-SCNC: 13 MMOL/L — SIGNIFICANT CHANGE UP (ref 5–17)
ANION GAP SERPL CALC-SCNC: 13 MMOL/L — SIGNIFICANT CHANGE UP (ref 5–17)
ANION GAP SERPL CALC-SCNC: 14 MMOL/L — SIGNIFICANT CHANGE UP (ref 5–17)
APPEARANCE UR: (no result)
APPEARANCE UR: CLEAR — SIGNIFICANT CHANGE UP
BILIRUB UR-MCNC: NEGATIVE — SIGNIFICANT CHANGE UP
BILIRUB UR-MCNC: NEGATIVE — SIGNIFICANT CHANGE UP
BUN SERPL-MCNC: 10 MG/DL — SIGNIFICANT CHANGE UP (ref 7–23)
BUN SERPL-MCNC: 12 MG/DL — SIGNIFICANT CHANGE UP (ref 7–23)
BUN SERPL-MCNC: 15 MG/DL — SIGNIFICANT CHANGE UP (ref 7–23)
BUN SERPL-MCNC: 16 MG/DL — SIGNIFICANT CHANGE UP (ref 7–23)
BUN SERPL-MCNC: 18 MG/DL — SIGNIFICANT CHANGE UP (ref 7–23)
BUN SERPL-MCNC: 9 MG/DL — SIGNIFICANT CHANGE UP (ref 7–23)
CALCIUM SERPL-MCNC: 8.8 MG/DL — SIGNIFICANT CHANGE UP (ref 8.4–10.5)
CALCIUM SERPL-MCNC: 8.9 MG/DL — SIGNIFICANT CHANGE UP (ref 8.4–10.5)
CALCIUM SERPL-MCNC: 8.9 MG/DL — SIGNIFICANT CHANGE UP (ref 8.4–10.5)
CALCIUM SERPL-MCNC: 9 MG/DL — SIGNIFICANT CHANGE UP (ref 8.4–10.5)
CALCIUM SERPL-MCNC: 9.1 MG/DL — SIGNIFICANT CHANGE UP (ref 8.4–10.5)
CALCIUM SERPL-MCNC: 9.3 MG/DL — SIGNIFICANT CHANGE UP (ref 8.4–10.5)
CHLORIDE SERPL-SCNC: 90 MMOL/L — LOW (ref 96–108)
CHLORIDE SERPL-SCNC: 91 MMOL/L — LOW (ref 96–108)
CHLORIDE SERPL-SCNC: 92 MMOL/L — LOW (ref 96–108)
CO2 SERPL-SCNC: 24 MMOL/L — SIGNIFICANT CHANGE UP (ref 22–31)
CO2 SERPL-SCNC: 26 MMOL/L — SIGNIFICANT CHANGE UP (ref 22–31)
CO2 SERPL-SCNC: 26 MMOL/L — SIGNIFICANT CHANGE UP (ref 22–31)
COLOR SPEC: YELLOW — SIGNIFICANT CHANGE UP
COLOR SPEC: YELLOW — SIGNIFICANT CHANGE UP
CORTIS PRE/P CHAL SERPL-SCNC: SIGNIFICANT CHANGE UP
CORTIS SP2 SERPL-MCNC: 18.7 UG/DL — SIGNIFICANT CHANGE UP (ref 2.9–25)
CREAT ?TM UR-MCNC: 147 MG/DL — SIGNIFICANT CHANGE UP
CREAT ?TM UR-MCNC: 65 MG/DL — SIGNIFICANT CHANGE UP
CREAT ?TM UR-MCNC: 80 MG/DL — SIGNIFICANT CHANGE UP
CREAT ?TM UR-MCNC: 95 MG/DL — SIGNIFICANT CHANGE UP
CREAT SERPL-MCNC: 0.65 MG/DL — SIGNIFICANT CHANGE UP (ref 0.5–1.3)
CREAT SERPL-MCNC: 0.65 MG/DL — SIGNIFICANT CHANGE UP (ref 0.5–1.3)
CREAT SERPL-MCNC: 0.67 MG/DL — SIGNIFICANT CHANGE UP (ref 0.5–1.3)
CREAT SERPL-MCNC: 0.68 MG/DL — SIGNIFICANT CHANGE UP (ref 0.5–1.3)
CREAT SERPL-MCNC: 0.7 MG/DL — SIGNIFICANT CHANGE UP (ref 0.5–1.3)
CREAT SERPL-MCNC: 0.81 MG/DL — SIGNIFICANT CHANGE UP (ref 0.5–1.3)
DIFF PNL FLD: (no result)
DIFF PNL FLD: (no result)
GLUCOSE SERPL-MCNC: 106 MG/DL — HIGH (ref 70–99)
GLUCOSE SERPL-MCNC: 110 MG/DL — HIGH (ref 70–99)
GLUCOSE SERPL-MCNC: 111 MG/DL — HIGH (ref 70–99)
GLUCOSE SERPL-MCNC: 118 MG/DL — HIGH (ref 70–99)
GLUCOSE SERPL-MCNC: 133 MG/DL — HIGH (ref 70–99)
GLUCOSE SERPL-MCNC: 98 MG/DL — SIGNIFICANT CHANGE UP (ref 70–99)
GLUCOSE UR QL: NEGATIVE — SIGNIFICANT CHANGE UP
GLUCOSE UR QL: NEGATIVE — SIGNIFICANT CHANGE UP
KETONES UR-MCNC: NEGATIVE — SIGNIFICANT CHANGE UP
KETONES UR-MCNC: NEGATIVE — SIGNIFICANT CHANGE UP
LEUKOCYTE ESTERASE UR-ACNC: NEGATIVE — SIGNIFICANT CHANGE UP
LEUKOCYTE ESTERASE UR-ACNC: NEGATIVE — SIGNIFICANT CHANGE UP
MAGNESIUM SERPL-MCNC: 1.9 MG/DL — SIGNIFICANT CHANGE UP (ref 1.6–2.6)
MAGNESIUM SERPL-MCNC: 2 MG/DL — SIGNIFICANT CHANGE UP (ref 1.6–2.6)
MAGNESIUM SERPL-MCNC: 2 MG/DL — SIGNIFICANT CHANGE UP (ref 1.6–2.6)
MAGNESIUM SERPL-MCNC: 2.1 MG/DL — SIGNIFICANT CHANGE UP (ref 1.6–2.6)
NITRITE UR-MCNC: NEGATIVE — SIGNIFICANT CHANGE UP
NITRITE UR-MCNC: NEGATIVE — SIGNIFICANT CHANGE UP
OSMOLALITY SERPL: 264 MOSM/KG — LOW (ref 280–301)
OSMOLALITY SERPL: 266 MOSM/KG — LOW (ref 280–301)
OSMOLALITY SERPL: 272 MOSM/KG — LOW (ref 280–301)
OSMOLALITY SERPL: 272 MOSM/KG — LOW (ref 280–301)
OSMOLALITY SERPL: 273 MOSM/KG — LOW (ref 280–301)
OSMOLALITY UR: 379 MOSMOL/KG — SIGNIFICANT CHANGE UP (ref 100–650)
OSMOLALITY UR: 550 MOSMOL/KG — SIGNIFICANT CHANGE UP (ref 100–650)
OSMOLALITY UR: 568 MOSMOL/KG — SIGNIFICANT CHANGE UP (ref 100–650)
OSMOLALITY UR: 710 MOSMOL/KG — HIGH (ref 100–650)
PH UR: 6 — SIGNIFICANT CHANGE UP (ref 5–8)
PH UR: 7 — SIGNIFICANT CHANGE UP (ref 5–8)
POTASSIUM SERPL-MCNC: 4.1 MMOL/L — SIGNIFICANT CHANGE UP (ref 3.5–5.3)
POTASSIUM SERPL-MCNC: 4.2 MMOL/L — SIGNIFICANT CHANGE UP (ref 3.5–5.3)
POTASSIUM SERPL-MCNC: 4.4 MMOL/L — SIGNIFICANT CHANGE UP (ref 3.5–5.3)
POTASSIUM SERPL-MCNC: 4.5 MMOL/L — SIGNIFICANT CHANGE UP (ref 3.5–5.3)
POTASSIUM SERPL-MCNC: 4.6 MMOL/L — SIGNIFICANT CHANGE UP (ref 3.5–5.3)
POTASSIUM SERPL-MCNC: 5 MMOL/L — SIGNIFICANT CHANGE UP (ref 3.5–5.3)
POTASSIUM SERPL-SCNC: 4.1 MMOL/L — SIGNIFICANT CHANGE UP (ref 3.5–5.3)
POTASSIUM SERPL-SCNC: 4.2 MMOL/L — SIGNIFICANT CHANGE UP (ref 3.5–5.3)
POTASSIUM SERPL-SCNC: 4.4 MMOL/L — SIGNIFICANT CHANGE UP (ref 3.5–5.3)
POTASSIUM SERPL-SCNC: 4.5 MMOL/L — SIGNIFICANT CHANGE UP (ref 3.5–5.3)
POTASSIUM SERPL-SCNC: 4.6 MMOL/L — SIGNIFICANT CHANGE UP (ref 3.5–5.3)
POTASSIUM SERPL-SCNC: 5 MMOL/L — SIGNIFICANT CHANGE UP (ref 3.5–5.3)
PROT UR-MCNC: 30 MG/DL
PROT UR-MCNC: NEGATIVE MG/DL — SIGNIFICANT CHANGE UP
SODIUM SERPL-SCNC: 125 MMOL/L — LOW (ref 135–145)
SODIUM SERPL-SCNC: 127 MMOL/L — LOW (ref 135–145)
SODIUM SERPL-SCNC: 128 MMOL/L — LOW (ref 135–145)
SODIUM SERPL-SCNC: 129 MMOL/L — LOW (ref 135–145)
SODIUM UR-SCNC: 120 MMOL/L — SIGNIFICANT CHANGE UP
SODIUM UR-SCNC: 28 MMOL/L — SIGNIFICANT CHANGE UP
SODIUM UR-SCNC: 76 MMOL/L — SIGNIFICANT CHANGE UP
SODIUM UR-SCNC: 91 MMOL/L — SIGNIFICANT CHANGE UP
SP GR SPEC: 1.01 — SIGNIFICANT CHANGE UP (ref 1–1.03)
SP GR SPEC: 1.01 — SIGNIFICANT CHANGE UP (ref 1–1.03)
TSH SERPL-MCNC: 1.06 UIU/ML — SIGNIFICANT CHANGE UP (ref 0.35–4.94)
URATE SERPL-MCNC: 3.4 MG/DL — SIGNIFICANT CHANGE UP (ref 3.4–8.8)
URATE UR-MCNC: 67.4 MG/DL — SIGNIFICANT CHANGE UP
UROBILINOGEN FLD QL: 0.2 E.U./DL — SIGNIFICANT CHANGE UP
UROBILINOGEN FLD QL: 1 E.U./DL — SIGNIFICANT CHANGE UP
UUN UR-MCNC: 539 MG/DL — SIGNIFICANT CHANGE UP
UUN UR-MCNC: 701 MG/DL — SIGNIFICANT CHANGE UP

## 2018-05-08 PROCEDURE — 99233 SBSQ HOSP IP/OBS HIGH 50: CPT

## 2018-05-08 PROCEDURE — 99233 SBSQ HOSP IP/OBS HIGH 50: CPT | Mod: GC

## 2018-05-08 NOTE — PROGRESS NOTE ADULT - PROBLEM SELECTOR PLAN 1
-initially hypovolemic hyponatremia s/p 2 liters. Serum osmolality low, increased urine osmolality. Small improvement of 121-122 after 2 liters of fluids. Patient asymptomatic, increasing likelyhood of non-acute hyponatremia.   -currently SIADH picture, renal consulted  -IVF discontinued  -c/w fluid restriction   -f/u BMP  -Avoid over-correction, no more than 6-8 mEq over 24 hours.

## 2018-05-08 NOTE — PROGRESS NOTE ADULT - PROBLEM SELECTOR PLAN 1
Patient's sodium from the morning was 125. Repeat sodium is 128 with urine sodium of 76 and urine osmolality of 550. Also serum uric acid is normal. All of which supports the diagnosis of an ADH effect.     P - would recommend to fluid restrict patient to 1L  Explained to patient in extensive detail of his current condition and the risks associated with worsening hyponatremia. Patient is very reluctant to decreasing fluid intake as he believes he will not be able to urinate. Explained to patient that he has a normal renal function, and despite drinking only 1L of fluid, he will still be able to urinate. Patient scoffs and states he will "try" to drink less fluid but he doubts that he will able to control his fluid intake.   Please reinforce with patient about continuing with fluid restriction and the dangers of having a worsening hyponatremia.   Please monitor strict I/Os  Please check BMP and urine lytes q4h

## 2018-05-08 NOTE — PROGRESS NOTE BEHAVIORAL HEALTH - NSBHFUPINTERVALHXFT_PSY_A_CORE
Patient seen at bedside.  Seroquel and Lexapro held due to ongoing hyponatremia, cause unclear.  Patient continues to be slightly psychomotor agitated, vague regarding events leading to admission.  Says "I don't think so" when asked about ongoing SI.  Continues to ruminate on why he cut his wrist as opposed to asking for help.  Reports he slept poorly last night.    Psychology intern Yloy Goodman placed call to patient's wife.

## 2018-05-08 NOTE — PROGRESS NOTE ADULT - SUBJECTIVE AND OBJECTIVE BOX
INTERVAL HPI/OVERNIGHT EVENTS: NETO.     SUBJECTIVE: Patient seen and examined at bedside. No suicidal ideations. Continues to ponder why patient committed self harm. AAOx3, no fever, chills, n/v or diarrhea.     OBJECTIVE:    VITAL SIGNS:  ICU Vital Signs Last 24 Hrs  T(C): 36.3 (08 May 2018 08:32), Max: 36.7 (07 May 2018 15:31)  T(F): 97.3 (08 May 2018 08:32), Max: 98.1 (07 May 2018 15:31)  HR: 77 (08 May 2018 08:32) (76 - 89)  BP: 109/70 (08 May 2018 08:32) (109/70 - 155/95)  BP(mean): 115 (07 May 2018 15:31) (115 - 115)  ABP: --  ABP(mean): --  RR: 16 (08 May 2018 08:32) (16 - 20)  SpO2: 98% (08 May 2018 08:32) (95% - 98%)         @ 07:  -   @ 07:00  --------------------------------------------------------  IN: 780 mL / OUT: 0 mL / NET: 780 mL    08 @ 07:  -  08 @ 10:32  --------------------------------------------------------  IN: 0 mL / OUT: 210 mL / NET: -210 mL      CAPILLARY BLOOD GLUCOSE          PHYSICAL EXAM:    General: NAD  HEENT: NC/AT; PERRL, clear conjunctiva  Neck: supple  Respiratory: CTA b/l  Cardiovascular: +S1/S2; RRR  Abdomen: soft, NT/ND; +BS x4  Extremities: WWP, 2+ peripheral pulses b/l; no LE edema  Skin: normal color and turgor; no rash  Neurological: AAOx3. No focal deficits.     MEDICATIONS:  MEDICATIONS  (STANDING):    MEDICATIONS  (PRN):  acetaminophen   Tablet. 650 milliGRAM(s) Oral every 6 hours PRN Mild Pain (1 - 3)      ALLERGIES:  Allergies    No Known Allergies    Intolerances        LABS:                        14.2   5.7   )-----------( 227      ( 07 May 2018 06:40 )             40.1     05-08    125<L>  |  90<L>  |  9   ----------------------------<  111<H>  4.1   |  24  |  0.67    Ca    9.0      08 May 2018 06:09  Phos  3.7     05-07  Mg     2.0     05-08        Urinalysis Basic - ( 08 May 2018 09:43 )    Color: Yellow / Appearance: SL Cloudy / S.015 / pH: x  Gluc: x / Ketone: NEGATIVE  / Bili: Negative / Urobili: 1.0 E.U./dL   Blood: x / Protein: 30 mg/dL / Nitrite: NEGATIVE   Leuk Esterase: NEGATIVE / RBC: 5-10 /HPF / WBC < 5 /HPF   Sq Epi: x / Non Sq Epi: 0-5 /HPF / Bacteria: Present /HPF        RADIOLOGY & ADDITIONAL TESTS: Reviewed.

## 2018-05-08 NOTE — PROGRESS NOTE ADULT - SUBJECTIVE AND OBJECTIVE BOX
Patient seen and examined at bedside. Patient is a 58 year old male with a history of alcohol abuse but has been sober for 20 years and depression for whom nephrology was consulted for hyponatremia. Patient denies any shortness of breath. Patient admits to drinking multiple liters of fluid. He states that he does not want to restrict his fluid intake to 1L as he believes he will not be able to urinate after that.     acetaminophen   Tablet. 650 milliGRAM(s) every 6 hours PRN    Allergies    No Known Allergies    Intolerances    T(C): , Max: 36.7 (18 @ 15:31)  T(F): , Max: 98.1 (18 @ 15:31)  HR: 77 (18 08:32)  BP: 109/70 (18 08:32)  BP(mean): 115 (18 @ 15:31)  RR: 16 (18 @ 08:32)  SpO2: 98% (18 @ 08:32)     @ 07:01  -   @ 07:00  --------------------------------------------------------  IN:    sodium chloride 0.9%: 300 mL    sodium chloride 0.9%: 480 mL  Total IN: 780 mL    OUT:  Total OUT: 0 mL    Total NET: 780 mL       @ 07:01  -   @ 13:41  --------------------------------------------------------  IN:  Total IN: 0 mL    OUT:    Voided: 210 mL  Total OUT: 210 mL    Total NET: -210 mL    LABS:                        14.2   5.7   )-----------( 227      ( 07 May 2018 06:40 )             40.1     05-08    128<L>  |  90<L>  |  12  ----------------------------<  133<H>  4.2   |  26  |  0.65    Ca    9.3      08 May 2018 10:37  Phos  3.7     -  Mg     2.1     -    Uric Acid, Serum: 3.4 mg/dL [3.4 - 8.8] ( @ 10:37)  Osmolality, Serum: 264 mosm/kg <L> [280 - 301] ( @ 06:09)    Urinalysis Basic - ( 08 May 2018 09:43 )    Color: Yellow / Appearance: SL Cloudy / S.015 / pH: x  Gluc: x / Ketone: NEGATIVE  / Bili: Negative / Urobili: 1.0 E.U./dL   Blood: x / Protein: 30 mg/dL / Nitrite: NEGATIVE   Leuk Esterase: NEGATIVE / RBC: 5-10 /HPF / WBC < 5 /HPF   Sq Epi: x / Non Sq Epi: 0-5 /HPF / Bacteria: Present /HPF    Osmolality, Random Urine: 550 mosmol/kg [100 - 650] ( @ 09:43)  Creatinine, Random Urine: 147 mg/dL ( @ 09:43)

## 2018-05-08 NOTE — PROGRESS NOTE BEHAVIORAL HEALTH - SUMMARY
58M PPH alcohol use disorder in remission, likely depressive illness, not currently in outpatient care, who presented to ED on 5/5 after suicide attempt via cutting wrist and was admitted to medicine for hyponatremia. He is currently experiencing depressed mood, ruminative thoughts, feelings of guilt.  He presents at an acute risk of danger to self given recent attempt and requires inpatient psychiatric admission when medically cleared.  Patient with ongoing hyponatremia, nephrology consulted.

## 2018-05-08 NOTE — PROGRESS NOTE ADULT - PROBLEM SELECTOR PLAN 6
F: none  E: replete PRN  N: regular diet with fluid restriction  No HSQ needed  Full Code  Dispo: Medicine then psychiatry

## 2018-05-09 DIAGNOSIS — E22.2 SYNDROME OF INAPPROPRIATE SECRETION OF ANTIDIURETIC HORMONE: ICD-10-CM

## 2018-05-09 DIAGNOSIS — B19.20 UNSPECIFIED VIRAL HEPATITIS C WITHOUT HEPATIC COMA: ICD-10-CM

## 2018-05-09 PROBLEM — F32.9 MAJOR DEPRESSIVE DISORDER, SINGLE EPISODE, UNSPECIFIED: Chronic | Status: ACTIVE | Noted: 2018-05-05

## 2018-05-09 PROBLEM — F10.11 ALCOHOL ABUSE, IN REMISSION: Chronic | Status: ACTIVE | Noted: 2018-05-05

## 2018-05-09 LAB
ANION GAP SERPL CALC-SCNC: 12 MMOL/L — SIGNIFICANT CHANGE UP (ref 5–17)
ANION GAP SERPL CALC-SCNC: 13 MMOL/L — SIGNIFICANT CHANGE UP (ref 5–17)
ANION GAP SERPL CALC-SCNC: 13 MMOL/L — SIGNIFICANT CHANGE UP (ref 5–17)
ANION GAP SERPL CALC-SCNC: 14 MMOL/L — SIGNIFICANT CHANGE UP (ref 5–17)
ANION GAP SERPL CALC-SCNC: 15 MMOL/L — SIGNIFICANT CHANGE UP (ref 5–17)
BUN SERPL-MCNC: 14 MG/DL — SIGNIFICANT CHANGE UP (ref 7–23)
BUN SERPL-MCNC: 15 MG/DL — SIGNIFICANT CHANGE UP (ref 7–23)
BUN SERPL-MCNC: 16 MG/DL — SIGNIFICANT CHANGE UP (ref 7–23)
BUN SERPL-MCNC: 17 MG/DL — SIGNIFICANT CHANGE UP (ref 7–23)
BUN SERPL-MCNC: 20 MG/DL — SIGNIFICANT CHANGE UP (ref 7–23)
CALCIUM SERPL-MCNC: 8.9 MG/DL — SIGNIFICANT CHANGE UP (ref 8.4–10.5)
CALCIUM SERPL-MCNC: 9.1 MG/DL — SIGNIFICANT CHANGE UP (ref 8.4–10.5)
CALCIUM SERPL-MCNC: 9.3 MG/DL — SIGNIFICANT CHANGE UP (ref 8.4–10.5)
CALCIUM SERPL-MCNC: 9.4 MG/DL — SIGNIFICANT CHANGE UP (ref 8.4–10.5)
CALCIUM SERPL-MCNC: 9.8 MG/DL — SIGNIFICANT CHANGE UP (ref 8.4–10.5)
CHLORIDE SERPL-SCNC: 88 MMOL/L — LOW (ref 96–108)
CHLORIDE SERPL-SCNC: 89 MMOL/L — LOW (ref 96–108)
CHLORIDE SERPL-SCNC: 90 MMOL/L — LOW (ref 96–108)
CHLORIDE SERPL-SCNC: 92 MMOL/L — LOW (ref 96–108)
CHLORIDE SERPL-SCNC: 93 MMOL/L — LOW (ref 96–108)
CO2 SERPL-SCNC: 22 MMOL/L — SIGNIFICANT CHANGE UP (ref 22–31)
CO2 SERPL-SCNC: 25 MMOL/L — SIGNIFICANT CHANGE UP (ref 22–31)
CO2 SERPL-SCNC: 26 MMOL/L — SIGNIFICANT CHANGE UP (ref 22–31)
CO2 SERPL-SCNC: 26 MMOL/L — SIGNIFICANT CHANGE UP (ref 22–31)
CO2 SERPL-SCNC: 27 MMOL/L — SIGNIFICANT CHANGE UP (ref 22–31)
CREAT ?TM UR-MCNC: 181 MG/DL — SIGNIFICANT CHANGE UP
CREAT SERPL-MCNC: 0.72 MG/DL — SIGNIFICANT CHANGE UP (ref 0.5–1.3)
CREAT SERPL-MCNC: 0.76 MG/DL — SIGNIFICANT CHANGE UP (ref 0.5–1.3)
CREAT SERPL-MCNC: 0.8 MG/DL — SIGNIFICANT CHANGE UP (ref 0.5–1.3)
GLUCOSE SERPL-MCNC: 108 MG/DL — HIGH (ref 70–99)
GLUCOSE SERPL-MCNC: 111 MG/DL — HIGH (ref 70–99)
GLUCOSE SERPL-MCNC: 95 MG/DL — SIGNIFICANT CHANGE UP (ref 70–99)
GLUCOSE SERPL-MCNC: 95 MG/DL — SIGNIFICANT CHANGE UP (ref 70–99)
GLUCOSE SERPL-MCNC: 99 MG/DL — SIGNIFICANT CHANGE UP (ref 70–99)
OSMOLALITY SERPL: 271 MOSM/KG — LOW (ref 280–301)
OSMOLALITY SERPL: 271 MOSM/KG — LOW (ref 280–301)
OSMOLALITY SERPL: 272 MOSM/KG — LOW (ref 280–301)
OSMOLALITY SERPL: 273 MOSM/KG — LOW (ref 280–301)
OSMOLALITY UR: 792 MOSMOL/KG — HIGH (ref 100–650)
OSMOLALITY UR: 870 MOSMOL/KG — HIGH (ref 100–650)
POTASSIUM SERPL-MCNC: 4 MMOL/L — SIGNIFICANT CHANGE UP (ref 3.5–5.3)
POTASSIUM SERPL-MCNC: 4.5 MMOL/L — SIGNIFICANT CHANGE UP (ref 3.5–5.3)
POTASSIUM SERPL-MCNC: 4.7 MMOL/L — SIGNIFICANT CHANGE UP (ref 3.5–5.3)
POTASSIUM SERPL-MCNC: 4.8 MMOL/L — SIGNIFICANT CHANGE UP (ref 3.5–5.3)
POTASSIUM SERPL-MCNC: 4.8 MMOL/L — SIGNIFICANT CHANGE UP (ref 3.5–5.3)
POTASSIUM SERPL-SCNC: 4 MMOL/L — SIGNIFICANT CHANGE UP (ref 3.5–5.3)
POTASSIUM SERPL-SCNC: 4.5 MMOL/L — SIGNIFICANT CHANGE UP (ref 3.5–5.3)
POTASSIUM SERPL-SCNC: 4.7 MMOL/L — SIGNIFICANT CHANGE UP (ref 3.5–5.3)
POTASSIUM SERPL-SCNC: 4.8 MMOL/L — SIGNIFICANT CHANGE UP (ref 3.5–5.3)
POTASSIUM SERPL-SCNC: 4.8 MMOL/L — SIGNIFICANT CHANGE UP (ref 3.5–5.3)
SODIUM SERPL-SCNC: 126 MMOL/L — LOW (ref 135–145)
SODIUM SERPL-SCNC: 128 MMOL/L — LOW (ref 135–145)
SODIUM SERPL-SCNC: 129 MMOL/L — LOW (ref 135–145)
SODIUM SERPL-SCNC: 130 MMOL/L — LOW (ref 135–145)
SODIUM SERPL-SCNC: 132 MMOL/L — LOW (ref 135–145)
SODIUM UR-SCNC: 21 MMOL/L — SIGNIFICANT CHANGE UP
SODIUM UR-SCNC: 55 MMOL/L — SIGNIFICANT CHANGE UP

## 2018-05-09 PROCEDURE — 99233 SBSQ HOSP IP/OBS HIGH 50: CPT | Mod: GC

## 2018-05-09 PROCEDURE — 99231 SBSQ HOSP IP/OBS SF/LOW 25: CPT | Mod: GC

## 2018-05-09 PROCEDURE — 99233 SBSQ HOSP IP/OBS HIGH 50: CPT

## 2018-05-09 RX ORDER — ACETAMINOPHEN 500 MG
650 TABLET ORAL EVERY 6 HOURS
Qty: 0 | Refills: 0 | Status: DISCONTINUED | OUTPATIENT
Start: 2018-05-11 | End: 2018-05-16

## 2018-05-09 RX ORDER — LANOLIN ALCOHOL/MO/W.PET/CERES
3 CREAM (GRAM) TOPICAL ONCE
Qty: 0 | Refills: 0 | Status: COMPLETED | OUTPATIENT
Start: 2018-05-09 | End: 2018-05-09

## 2018-05-09 RX ORDER — HALOPERIDOL DECANOATE 100 MG/ML
5 INJECTION INTRAMUSCULAR EVERY 4 HOURS
Qty: 0 | Refills: 0 | Status: DISCONTINUED | OUTPATIENT
Start: 2018-05-11 | End: 2018-05-16

## 2018-05-09 RX ORDER — QUETIAPINE FUMARATE 200 MG/1
50 TABLET, FILM COATED ORAL AT BEDTIME
Qty: 0 | Refills: 0 | Status: DISCONTINUED | OUTPATIENT
Start: 2018-05-11 | End: 2018-05-16

## 2018-05-09 RX ORDER — ESCITALOPRAM OXALATE 10 MG/1
10 TABLET, FILM COATED ORAL DAILY
Qty: 0 | Refills: 0 | Status: DISCONTINUED | OUTPATIENT
Start: 2018-05-11 | End: 2018-05-16

## 2018-05-09 RX ADMIN — Medication 3 MILLIGRAM(S): at 00:45

## 2018-05-09 NOTE — PROGRESS NOTE ADULT - SUBJECTIVE AND OBJECTIVE BOX
Patient seen and examined at bedside. Patient is a 58 year old male with a history of alcohol abuse but has been sober for 20 years and depression for whom nephrology was consulted for hyponatremia. Patient states that he did restrict his fluid intake though he admits he drank more then 1L. Patient denies any nausea, vomiting, cough, or shortness of breath.     acetaminophen   Tablet. 650 milliGRAM(s) every 6 hours PRN    Allergies    No Known Allergies    Intolerances    T(C): , Max: 36.9 (18 @ 20:53)  T(F): , Max: 98.5 (18 @ 20:53)  HR: 86 (18 @ 05:23)  BP: 128/52 (18 @ 05:23)  RR: 16 (18 @ 05:23)  SpO2: 96% (18 @ 05:23)     @ 07:01  -   @ 07:00  --------------------------------------------------------  IN:  Total IN: 0 mL    OUT:    Voided: 210 mL  Total OUT: 210 mL    Total NET: -210 mL    LABS:        132<L>  |  93<L>  |  14  ----------------------------<  95  4.7   |  26  |  0.72    Ca    9.4      09 May 2018 07:25  Mg     2.0         Osmolality, Serum: 271 mosm/kg <L> [280 - 301] ( @ 07:25)  Osmolality, Serum: 273 mosm/kg <L> [280 - 301] ( @ 02:28)    Urinalysis Basic - ( 08 May 2018 09:43 )    Color: Yellow / Appearance: SL Cloudy / S.015 / pH: x  Gluc: x / Ketone: NEGATIVE  / Bili: Negative / Urobili: 1.0 E.U./dL   Blood: x / Protein: 30 mg/dL / Nitrite: NEGATIVE   Leuk Esterase: NEGATIVE / RBC: 5-10 /HPF / WBC < 5 /HPF   Sq Epi: x / Non Sq Epi: 0-5 /HPF / Bacteria: Present /HPF    Sodium, Random Urine: 120 mmol/L ( @ 19:11)  Osmolality, Random Urine: 710 mosmol/kg <H> [100 - 650] ( @ 19:11)

## 2018-05-09 NOTE — PROGRESS NOTE BEHAVIORAL HEALTH - NSBHCHARTREVIEWVS_PSY_A_CORE FT
Vital Signs Last 24 Hrs  T(C): 36.8 (07 May 2018 08:58), Max: 37.1 (06 May 2018 15:36)  T(F): 98.2 (07 May 2018 08:58), Max: 98.7 (06 May 2018 15:36)  HR: 75 (07 May 2018 08:58) (71 - 90)  BP: 114/74 (07 May 2018 08:58) (113/66 - 134/70)  BP(mean): --  RR: 16 (07 May 2018 08:58) (16 - 19)  SpO2: 96% (07 May 2018 08:58) (95% - 96%)
Vital Signs Last 24 Hrs  T(C): 36.4 (09 May 2018 05:23), Max: 36.9 (08 May 2018 20:53)  T(F): 97.6 (09 May 2018 05:23), Max: 98.5 (08 May 2018 20:53)  HR: 86 (09 May 2018 05:23) (86 - 98)  BP: 128/52 (09 May 2018 05:23) (115/80 - 133/79)  BP(mean): --  RR: 16 (09 May 2018 05:23) (16 - 20)  SpO2: 96% (09 May 2018 05:23) (96% - 96%)
Vital Signs Last 24 Hrs  T(C): 36.6 (08 May 2018 15:26), Max: 36.6 (07 May 2018 20:49)  T(F): 97.8 (08 May 2018 15:26), Max: 97.8 (07 May 2018 20:49)  HR: 98 (08 May 2018 15:26) (76 - 98)  BP: 133/79 (08 May 2018 15:26) (109/70 - 133/79)  BP(mean): --  RR: 18 (08 May 2018 15:26) (16 - 20)  SpO2: 96% (08 May 2018 15:26) (95% - 98%)

## 2018-05-09 NOTE — PROGRESS NOTE BEHAVIORAL HEALTH - SUMMARY
The patient is a 58-year old man with a past history of alcohol use disorder in remission, likely depressive illness, not currently in outpatient care, who presented to ED on 5/5 after suicide attempt via cutting wrist and was admitted to medicine for hyponatremia, now improved. He is currently experiencing depressed mood, ruminative thoughts, feelings of guilt.  He presents at an acute risk of danger to self given recent attempt and requires inpatient psychiatric admission. The patient is a 58-year old man with a past history of alcohol use disorder in remission, likely depressive illness, not currently in outpatient care, who presented to ED on 5/5 after suicide attempt via cutting wrist and was admitted to medicine for hyponatremia, now improved. He is currently experiencing depressed mood, ruminative thoughts, feelings of guilt.  He presents at an acute risk of danger to self given recent attempt and requires inpatient psychiatric admission.    Plan was for transfer today but patient's Na again downtrending, [facility] transfer canceled for today.

## 2018-05-09 NOTE — PROGRESS NOTE BEHAVIORAL HEALTH - NSBHFUPINTERVALHXFT_PSY_A_CORE
Patient seen at bedside, 1:1 present.  Repeat Na 132 this morning, now medically cleared for transfer.  Patient now reporting he was taking multiple herbal supplements for the last few years and wonders if they contributed to poor sleep, SI, and hyponatremia.  Has them in room: leg cramp relief, creatine, strontium, taurine, ANDREEA, glycine, hyaluronic acis, tryptophan, nightime leg, 5-HTP, selenium, glucosamine, B-right, boscwella and curium, bone-up, arginine, beta-stiosterol.    Transfer Note:  The patient is a 58-year old man with a past history of alcohol use disorder in remission, likely depressive illness, not currently in outpatient care, who presented to ED on 5/5 after suicide attempt via cutting wrist and was admitted to medicine for hyponatremia.  He reports regretting suicide attempt and denies current SI but continues to experience depressed mood, ruminative thoughts, feelings of guilt.  He presents at an acute risk of danger to self given recent attempt and requires inpatient psychiatric admission when medically cleared.  Na 121 on admission, now 132.  Medicine consult to follow with daily BMPs and fluid restriction to 1.2 L daily.  Plastic Surgeon Dr. Jo sutured and splinted the patient's left wrist, as he had suffered a severe tendon injury with median nerve damage.  Dr. Jo will follow [phone]; patient will require surgery within next two weeks of injury.  Patient also HCV positive on routine screening, will need outpatient follow up. Patient seen at bedside, 1:1 present.  Repeat Na 132 this morning, now medically cleared for transfer.  Patient now reporting he was taking multiple herbal supplements for the last few years and wonders if they contributed to poor sleep, SI, and hyponatremia.  Has them in room: leg cramp relief, creatine, strontium, taurine, ANDREEA, glycine, hyaluronic acis, tryptophan, nightime leg, 5-HTP, selenium, glucosamine, B-right, boscwella and curium, bone-up, arginine, beta-stiosterol.    Update 5pm: patient's Na again downtrending, [facility] transfer canceled for today.    Transfer Note:  The patient is a 58-year old man with a past history of alcohol use disorder in remission, likely depressive illness, not currently in outpatient care, who presented to ED on 5/5 after suicide attempt via cutting wrist and was admitted to medicine for hyponatremia.  He reports regretting suicide attempt and denies current SI but continues to experience depressed mood, ruminative thoughts, feelings of guilt.  He presents at an acute risk of danger to self given recent attempt and requires inpatient psychiatric admission when medically cleared.  Na 121 on admission, now 132.  Medicine consult to follow with daily BMPs and fluid restriction to 1.2 L daily.  Plastic Surgeon Dr. Jo sutured and splinted the patient's left wrist, as he had suffered a severe tendon injury with median nerve damage.  Dr. Jo will follow [phone]; patient will require surgery within next two weeks of injury.  Patient also HCV positive on routine screening, will need outpatient follow up.

## 2018-05-09 NOTE — PROGRESS NOTE ADULT - SUBJECTIVE AND OBJECTIVE BOX
INTERVAL HPI/OVERNIGHT EVENTS: Melatonin overnight.     SUBJECTIVE: Patient seen and examined at bedside. No fever, chills, n/v or diarrhea. Last BM earlier today.     OBJECTIVE:    VITAL SIGNS:  ICU Vital Signs Last 24 Hrs  T(C): 36.4 (09 May 2018 05:23), Max: 36.9 (08 May 2018 20:53)  T(F): 97.6 (09 May 2018 05:23), Max: 98.5 (08 May 2018 20:53)  HR: 86 (09 May 2018 05:23) (86 - 98)  BP: 128/52 (09 May 2018 05:23) (115/80 - 133/79)  BP(mean): --  ABP: --  ABP(mean): --  RR: 16 (09 May 2018 05:23) (16 - 20)  SpO2: 96% (09 May 2018 05:23) (96% - 96%)         @ 07:01  -   @ 07:00  --------------------------------------------------------  IN: 0 mL / OUT: 210 mL / NET: -210 mL      CAPILLARY BLOOD GLUCOSE          PHYSICAL EXAM:    General: NAD  HEENT: NC/AT; PERRL, clear conjunctiva  Neck: supple  Respiratory: CTA b/l  Cardiovascular: +S1/S2; RRR  Abdomen: soft, NT/ND; +BS x4  Extremities: WWP, 2+ peripheral pulses b/l; no LE edema  Skin: normal color and turgor; no rash  Neurological: AAOx3, no focal deficits.     MEDICATIONS:  MEDICATIONS  (STANDING):    MEDICATIONS  (PRN):  acetaminophen   Tablet. 650 milliGRAM(s) Oral every 6 hours PRN Mild Pain (1 - 3)      ALLERGIES:  Allergies    No Known Allergies    Intolerances        LABS:        128<L>  |  88<L>  |  16  ----------------------------<  111<H>  4.8   |  26  |  0.80    Ca    9.8      09 May 2018 10:57  Mg     2.0             Urinalysis Basic - ( 08 May 2018 09:43 )    Color: Yellow / Appearance: SL Cloudy / S.015 / pH: x  Gluc: x / Ketone: NEGATIVE  / Bili: Negative / Urobili: 1.0 E.U./dL   Blood: x / Protein: 30 mg/dL / Nitrite: NEGATIVE   Leuk Esterase: NEGATIVE / RBC: 5-10 /HPF / WBC < 5 /HPF   Sq Epi: x / Non Sq Epi: 0-5 /HPF / Bacteria: Present /HPF        RADIOLOGY & ADDITIONAL TESTS: Reviewed.

## 2018-05-09 NOTE — PROGRESS NOTE ADULT - SUBJECTIVE AND OBJECTIVE BOX
pt with splint on left wrist. will need repair of flexor tendons and median nerve with in the next 10 days after psychiatry clearance; please do not discharge pt without calling me first [phone] dr garg; keep splint on and arm elevated at all times

## 2018-05-09 NOTE — PROGRESS NOTE ADULT - PROBLEM SELECTOR PLAN 1
Patient is a 58 year old male with hyponatremia likely from SIADH. Upon questioning, patient admits that he has been taking multiple over the counter "supplements" and herbal medications. Patient does not remember the name of these supplements and herbal medications, but one of these may be the culprit for SIADH. Sodium improving to 132 today     P - Would continue with 1L fluid restriction    Please check labs q8h with urine lytes also   Please reinforce with patient about continuing with fluid restriction and the dangers of having a worsening hyponatremia.  Also educated patient, to no longer take these supplements after he is discharged home. Told him to discuss with his primary care physician prior to starting any over the counter supplements and herbal remedies.    Please monitor strict I/Os  Please check BMP and urine lytes q4h

## 2018-05-09 NOTE — PROGRESS NOTE ADULT - PROBLEM SELECTOR PLAN 6
F: none  E: replete PRN  N: regular diet with fluid restriction  No HSQ needed  Full Code  Dispo: Medicine then psychiatry. Pt will need med consult to monitor SIADH and HCV infection while on psychiatry service.

## 2018-05-10 DIAGNOSIS — L30.4 ERYTHEMA INTERTRIGO: ICD-10-CM

## 2018-05-10 LAB
ANION GAP SERPL CALC-SCNC: 11 MMOL/L — SIGNIFICANT CHANGE UP (ref 5–17)
APPEARANCE UR: (no result)
BACTERIA # UR AUTO: PRESENT /HPF
BILIRUB UR-MCNC: NEGATIVE — SIGNIFICANT CHANGE UP
BUN SERPL-MCNC: 13 MG/DL — SIGNIFICANT CHANGE UP (ref 7–23)
CALCIUM SERPL-MCNC: 8.7 MG/DL — SIGNIFICANT CHANGE UP (ref 8.4–10.5)
CHLORIDE SERPL-SCNC: 94 MMOL/L — LOW (ref 96–108)
CO2 SERPL-SCNC: 25 MMOL/L — SIGNIFICANT CHANGE UP (ref 22–31)
COLOR SPEC: YELLOW — SIGNIFICANT CHANGE UP
COMMENT - URINE: SIGNIFICANT CHANGE UP
CREAT ?TM UR-MCNC: 114 MG/DL — SIGNIFICANT CHANGE UP
CREAT SERPL-MCNC: 0.67 MG/DL — SIGNIFICANT CHANGE UP (ref 0.5–1.3)
DIFF PNL FLD: (no result)
EPI CELLS # UR: SIGNIFICANT CHANGE UP /HPF (ref 0–5)
GLUCOSE SERPL-MCNC: 97 MG/DL — SIGNIFICANT CHANGE UP (ref 70–99)
GLUCOSE UR QL: NEGATIVE — SIGNIFICANT CHANGE UP
HCV RNA SERPL NAA DL=5-ACNC: HIGH IU/ML
HCV RNA SPEC NAA+PROBE-LOG IU: 5.24 LOGIU/ML — HIGH
KETONES UR-MCNC: NEGATIVE — SIGNIFICANT CHANGE UP
LEUKOCYTE ESTERASE UR-ACNC: NEGATIVE — SIGNIFICANT CHANGE UP
NITRITE UR-MCNC: NEGATIVE — SIGNIFICANT CHANGE UP
OSMOLALITY SERPL: 274 MOSM/KG — LOW (ref 280–301)
OSMOLALITY UR: 597 MOSMOL/KG — SIGNIFICANT CHANGE UP (ref 100–650)
PH UR: 7 — SIGNIFICANT CHANGE UP (ref 5–8)
POTASSIUM SERPL-MCNC: 4.3 MMOL/L — SIGNIFICANT CHANGE UP (ref 3.5–5.3)
POTASSIUM SERPL-SCNC: 4.3 MMOL/L — SIGNIFICANT CHANGE UP (ref 3.5–5.3)
PROT UR-MCNC: NEGATIVE MG/DL — SIGNIFICANT CHANGE UP
RBC CASTS # UR COMP ASSIST: (no result) /HPF
SODIUM SERPL-SCNC: 130 MMOL/L — LOW (ref 135–145)
SODIUM UR-SCNC: 75 MMOL/L — SIGNIFICANT CHANGE UP
SP GR SPEC: 1.01 — SIGNIFICANT CHANGE UP (ref 1–1.03)
UROBILINOGEN FLD QL: 0.2 E.U./DL — SIGNIFICANT CHANGE UP
WBC UR QL: < 5 /HPF — SIGNIFICANT CHANGE UP

## 2018-05-10 PROCEDURE — 99233 SBSQ HOSP IP/OBS HIGH 50: CPT

## 2018-05-10 PROCEDURE — 99233 SBSQ HOSP IP/OBS HIGH 50: CPT | Mod: GC

## 2018-05-10 RX ORDER — LIDOCAINE 4 G/100G
1 CREAM TOPICAL DAILY
Qty: 0 | Refills: 0 | Status: DISCONTINUED | OUTPATIENT
Start: 2018-05-10 | End: 2018-05-11

## 2018-05-10 RX ORDER — BENZOCAINE AND MENTHOL 5; 1 G/100ML; G/100ML
1 LIQUID ORAL THREE TIMES A DAY
Qty: 0 | Refills: 0 | Status: DISCONTINUED | OUTPATIENT
Start: 2018-05-10 | End: 2018-05-10

## 2018-05-10 RX ADMIN — Medication 1 APPLICATION(S): at 17:41

## 2018-05-10 NOTE — PROGRESS NOTE BEHAVIORAL HEALTH - NSBHCONSFOLLOWNEEDS_PSY_A_CORE
Patient needs further psychiatric safety assessment prior to discharge

## 2018-05-10 NOTE — PROGRESS NOTE ADULT - PROBLEM SELECTOR PLAN 1
Patient is a 58 year old male with hyponatremia likely from SIADH. Upon questioning, patient admits that he has been taking multiple over the counter "supplements" and herbal medications. Patient does not remember the name of these supplements and herbal medications, but one of these may be the culprit for SIADH. Sodium stable at 130. Patient's urine lytes suggest patient continues to have an on going ADH effect     P - Would continue with 1L fluid restriction    Patient can be discharged to [facility]   Please have patient's labs checked qdaily including a BMP and urine lytes  Nephrology team will follow patient while he is in [facility].   Also educated patient, to no longer take these supplements after he is discharged home. Told him to discuss with his primary care physician prior to starting any over the counter supplements and herbal remedies.    Please monitor strict I/Os

## 2018-05-10 NOTE — PROGRESS NOTE BEHAVIORAL HEALTH - NSBHCONSULTPRIMARYDISCUSSYES_PSY_A_CORE FT
Discussed further stabilization of sodium levels as well as surgical assessment prior to transfer to [facility] which is deferred for today.

## 2018-05-10 NOTE — PROGRESS NOTE ADULT - SUBJECTIVE AND OBJECTIVE BOX
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 Patient is presently on inpatient medicine being treated for hyponatremia with fluid restriction and continues to experience fluctuating sodium levels. At this time, discussion was held with Director and Nurse Manager of [facility] and decision made in context of patient's medical instability to defer transfer to [facility] at this time.     Patient seen at bedside reports "surprise" at his suicide attempt, reports good support from his wife. States that his decision was made in context of "poor decisions" regarding a rental house he owns with his wife as well as taking multiple supplements that "may have also been affecting my sleep."         MEDICATIONS  (STANDING):    MEDICATIONS  (PRN):  acetaminophen   Tablet. 650 milliGRAM(s) Oral every 6 hours PRN Mild Pain (1 - 3)      Allergies    No Known Allergies Please disregard: this documentation was erroneously placed in incorrect admission. 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 Patient is presently on inpatient medicine being treated for hyponatremia with fluid restriction and continues to experience fluctuating sodium levels. At this time, discussion was held with Director and Nurse Manager of [facility] and decision made in context of patient's medical instability to defer transfer to [facility] at this time.     Patient seen at bedside reports "surprise" at his suicide attempt, reports good support from his wife. States that his decision was made in context of "poor decisions" regarding a rental house he owns with his wife as well as taking multiple supplements that "may have also been affecting my sleep."         MEDICATIONS  (STANDING):    MEDICATIONS  (PRN):  acetaminophen   Tablet. 650 milliGRAM(s) Oral every 6 hours PRN Mild Pain (1 - 3)      Allergies    No Known Allergies
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
## 2018-05-10 NOTE — PROGRESS NOTE ADULT - PROBLEM SELECTOR PLAN 1
-initially hypovolemic hyponatremia s/p 2 liters. Serum osmolality low, increased urine osmolality. Small improvement of 121-122 after 2 liters of fluids. Patient asymptomatic, increasing likelyhood of non-acute hyponatremia.   -currently SIADH picture, renal consulted  -IVF discontinued  -c/w fluid restriction   -f/u BMP  -Avoid over-correction, no more than 6-8 mEq over 24 hours. -initially hypovolemic hyponatremia s/p 2 liters. Serum osmolality low, increased urine osmolality. Small improvement of 121-122 after 2 liters of fluids. Patient asymptomatic, increasing likelyhood of non-acute hyponatremia.   -currently SIADH picture, renal consulted  -IVF discontinued  -c/w fluid restriction   -f/u BMP  -Avoid over-correction, no more than 6-8 mEq over 24 hours.  -Renal to follow at [facility], will need daily BMP, serum osmolarity, urine Cr, urine Na and urine osmolarity

## 2018-05-10 NOTE — PROGRESS NOTE BEHAVIORAL HEALTH - NSBHCONSULTFOLLOWDETAILS_PSY_A_CORE FT
Plan is to transfer to psychiatric inpatient unit when suitably medically stable for [facility].
Requires transfer to [facility] when medically cleared
Requires transfer to [facility] when medically cleared

## 2018-05-10 NOTE — PROGRESS NOTE BEHAVIORAL HEALTH - NSBHFUPINTERVALHXFT_PSY_A_CORE
Mr. Aguilar is a 58 year old white male, with past history of alcohol use disorder in remission, no past psychiatric history, admitted to medicine after presenting to ED post suicide attempt by cutting his left wrist with a knife, also with recent diagnosis of HCV and hyponatremia (Na 121 on admission) on 1:1 observation, fluid restriction to 1.2 L daily.  Seen at bedside today, Mr. Aguilar reports feelings of embarrassment and disbelief regarding his suicide attempt: "the wound was so deep!" He reports that he would "like to just leave" and he continues to report concerns that he has "caused more trouble." He reports that his wife is supportive and has visited him everyday. He reports having not slept "at all" since January and that this may have been caused by his use of multiple herbal supplements for the past few years including creatine, strontium, taurine, ANDREEA, sylcine, hyaluronic acid, tryptophan, "nightime leg", %-HTP, selenium, glucosamine, "B-right," boscwella, curium, "bone up," arginine and beta-stiosterol. Today he is vague regarding the purpose of the supplementation and what initiated this regimen. He reports frustration regarding fluid restriction, purpose and general treatment goals. He reports that he is feeling much better now that he has slept for two nights in the hospital as well as reporting feeling some comfort from having 1:1 present in the room with him at all times. He is alert and oriented to person, place, time and circumstance, denies active thoughts of suicide or homicide at this time however is not able to provide future plan or coping mechanism to address suicidal thoughts in the future. There is no report of perceptual disturbances or irene delusions at this time. He is focused on financial difficulties and recent financial decisions that he made that he reports will have dire effects and that he feels unable to address or solve.     Update: on discussion with [facility] Nurse Manager, [facility] Medical Director, Mr. Aguilar is not sufficiently medically stable to be transferred to psychiatry at this time: hyponatremia remains unstable, no clear etiology has been identified and left forearm wound is significantly vulnerable and requires further surgical procedure. [facility] transfer cancelled for today. This was discussed with primary team (senior resident) with [facility] Nurse Manager present to review patient safety issues on inpatient psychiatry unit.

## 2018-05-10 NOTE — PROGRESS NOTE ADULT - PROBLEM SELECTOR PLAN 6
F: none  E: replete PRN  N: regular diet with fluid restriction  No HSQ needed  Full Code  Dispo: Medicine then psychiatry. Pt will need med consult to monitor SIADH and HCV infection while on psychiatry service. -noteced on left inguinal skin fold  -will order antitopical agent -noteced on left inguinal skin fold  -clotrimazole cream bid ordered

## 2018-05-10 NOTE — PROGRESS NOTE BEHAVIORAL HEALTH - SUMMARY
Mr. Aguilar is a 58-year old employed,  man with a past history of alcohol use disorder, symptoms likely consistent with depressive illness, not currently in outpatient care, who presented to ED after suicide attempt via cutting wrist in context of multiple stressors (mostly financial) as well as history of taking multiple supplements and increasing insomnia, now diagnosed with HCV and being treated for hyponatremia, including requiring fluid restriction and 1:1 to maintain no fluid intake and safety. He continues to report repetitive thoughts about his suicide attempt, reporting feelings of guilt and regret as well as thoughts about his inability to cope with his thoughts and feelings.  While Mr. Aguilar is not currently suicidal at time of today's assessment, his recent suicidal attempt represents an acute  risk given his recent lethal attempt, feelings of guilt and shame, his precarious financial situation, in addition to his gender and race, and lack of outpatient treatment. He continues to require inpatient psychiatric hospitalization for his safety which is deferred until he is medically stable and suitably safe for treatment on psychiatric inpatient unit.

## 2018-05-10 NOTE — PROGRESS NOTE ADULT - SUBJECTIVE AND OBJECTIVE BOX
INTERVAL HPI/OVERNIGHT EVENTS: NETO    SUBJECTIVE: Patient seen and examined at bedside. No fever, chills, n/v or diarrhea. Last BM yesterday. Passing flatus. Voiding well.     OBJECTIVE:    VITAL SIGNS:  ICU Vital Signs Last 24 Hrs  T(C): 36.6 (10 May 2018 05:43), Max: 36.8 (09 May 2018 21:03)  T(F): 97.8 (10 May 2018 05:43), Max: 98.3 (09 May 2018 21:03)  HR: 71 (10 May 2018 05:43) (71 - 98)  BP: 107/65 (10 May 2018 05:43) (107/65 - 140/78)  BP(mean): 99 (09 May 2018 15:13) (99 - 99)  ABP: --  ABP(mean): --  RR: 17 (10 May 2018 05:43) (17 - 18)  SpO2: 97% (10 May 2018 05:43) (97% - 98%)        05-10 @ 07:01  -  05-10 @ 08:44  --------------------------------------------------------  IN: 120 mL / OUT: 0 mL / NET: 120 mL      CAPILLARY BLOOD GLUCOSE          PHYSICAL EXAM:    General: NAD  HEENT: NC/AT; PERRL, clear conjunctiva  Neck: supple  Respiratory: CTA b/l  Cardiovascular: +S1/S2; RRR  Abdomen: soft, NT/ND; +BS x4  Extremities: WWP, 2+ peripheral pulses b/l; no LE edema  Skin: normal color and turgor; no rash  Neurological: AAOx3, no focal deficits.     MEDICATIONS:  MEDICATIONS  (STANDING):    MEDICATIONS  (PRN):  acetaminophen   Tablet. 650 milliGRAM(s) Oral every 6 hours PRN Mild Pain (1 - 3)      ALLERGIES:  Allergies    No Known Allergies    Intolerances        LABS:    05-10    130<L>  |  94<L>  |  13  ----------------------------<  97  4.3   |  25  |  0.67    Ca    8.7      10 May 2018 06:38  Mg     2.0     05-08        Urinalysis Basic - ( 10 May 2018 08:28 )    Color: Yellow / Appearance: Cloudy / S.010 / pH: x  Gluc: x / Ketone: NEGATIVE  / Bili: Negative / Urobili: 0.2 E.U./dL   Blood: x / Protein: NEGATIVE mg/dL / Nitrite: NEGATIVE   Leuk Esterase: NEGATIVE / RBC: x / WBC x   Sq Epi: x / Non Sq Epi: x / Bacteria: x        RADIOLOGY & ADDITIONAL TESTS: Reviewed.

## 2018-05-10 NOTE — PROGRESS NOTE ADULT - PROBLEM SELECTOR PLAN 7
F: none  E: replete PRN  N: regular diet with fluid restriction  No HSQ needed  Full Code  Dispo: Medicine then psychiatry. Pt will need med consult to monitor SIADH and HCV infection while on psychiatry service. F: none  E: replete PRN  N: regular diet with fluid restriction  No HSQ needed  Full Code  Dispo: cleared for psychiatry. Pt will need med consult to monitor SIADH and HCV infection while on psychiatry service.

## 2018-05-10 NOTE — PROGRESS NOTE ADULT - SUBJECTIVE AND OBJECTIVE BOX
Patient seen and examined at bedside.  Patient is a 58 year old male with a history of alcohol abuse but has been sober for 20 years and depression for whom nephrology was consulted for hyponatremia. Patient states he is feeling well. Patient states he is trying to drink limited amounts of fluid. Patient denies any nausea, vomiting, lightheadedness, or shortness of breath.     acetaminophen   Tablet. 650 milliGRAM(s) every 6 hours PRN  clotrimazole 1% Cream 1 Application(s) two times a day    Allergies    No Known Allergies    Intolerances    T(C): , Max: 36.8 (18 @ 21:03)  T(F): , Max: 98.3 (18 @ 21:03)  HR: 94 (05-10-18 @ 09:30)  BP: 118/78 (05-10-18 @ 09:30)  BP(mean): 99 (18 @ 15:13)  RR: 17 (05-10-18 @ 09:30)  SpO2: 97% (05-10-18 @ 09:30)    05-10 @ 07:01  -  05-10 @ 13:55  --------------------------------------------------------  IN:    Oral Fluid: 360 mL  Total IN: 360 mL    OUT:  Total OUT: 0 mL    Total NET: 360 mL    LABS:    05-10    130<L>  |  94<L>  |  13  ----------------------------<  97  4.3   |  25  |  0.67    Ca    8.7      10 May 2018 06:38  Mg     2.0         Osmolality, Serum: 274 mosm/kg <L> [280 - 301] (05-10 @ 06:38)  Osmolality, Serum: 271 mosm/kg <L> [280 - 301] ( @ 22:53)    Urinalysis Basic - ( 10 May 2018 08:28 )    Color: Yellow / Appearance: Cloudy / S.010 / pH: x  Gluc: x / Ketone: NEGATIVE  / Bili: Negative / Urobili: 0.2 E.U./dL   Blood: x / Protein: NEGATIVE mg/dL / Nitrite: NEGATIVE   Leuk Esterase: NEGATIVE / RBC: 5-10 /HPF / WBC < 5 /HPF   Sq Epi: x / Non Sq Epi: 0-5 /HPF / Bacteria: Present /HPF    Creatinine, Random Urine: 114 mg/dL (05-10 @ 08:27)  Sodium, Random Urine: 75 mmol/L (05-10 @ 08:27)

## 2018-05-11 ENCOUNTER — INPATIENT (INPATIENT)
Facility: HOSPITAL | Age: 58
LOS: 4 days | Discharge: HOME | DRG: 881 | End: 2018-05-16
Attending: STUDENT IN AN ORGANIZED HEALTH CARE EDUCATION/TRAINING PROGRAM | Admitting: STUDENT IN AN ORGANIZED HEALTH CARE EDUCATION/TRAINING PROGRAM
Payer: COMMERCIAL

## 2018-05-11 VITALS
RESPIRATION RATE: 18 BRPM | HEIGHT: 73 IN | DIASTOLIC BLOOD PRESSURE: 74 MMHG | HEART RATE: 81 BPM | WEIGHT: 190.04 LBS | SYSTOLIC BLOOD PRESSURE: 116 MMHG

## 2018-05-11 VITALS
DIASTOLIC BLOOD PRESSURE: 71 MMHG | OXYGEN SATURATION: 98 % | TEMPERATURE: 98 F | HEART RATE: 71 BPM | SYSTOLIC BLOOD PRESSURE: 110 MMHG | RESPIRATION RATE: 17 BRPM

## 2018-05-11 DIAGNOSIS — B19.20 UNSPECIFIED VIRAL HEPATITIS C WITHOUT HEPATIC COMA: ICD-10-CM

## 2018-05-11 DIAGNOSIS — S61.512S: ICD-10-CM

## 2018-05-11 DIAGNOSIS — F32.9 MAJOR DEPRESSIVE DISORDER, SINGLE EPISODE, UNSPECIFIED: ICD-10-CM

## 2018-05-11 DIAGNOSIS — S69.92XS UNSPECIFIED INJURY OF LEFT WRIST, HAND AND FINGER(S), SEQUELA: Chronic | ICD-10-CM
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
## 2018-05-11 DIAGNOSIS — F10.11 ALCOHOL ABUSE, IN REMISSION: ICD-10-CM

## 2018-05-11 LAB
ANION GAP SERPL CALC-SCNC: 12 MMOL/L — SIGNIFICANT CHANGE UP (ref 5–17)
BUN SERPL-MCNC: 20 MG/DL — SIGNIFICANT CHANGE UP (ref 7–23)
CALCIUM SERPL-MCNC: 9 MG/DL — SIGNIFICANT CHANGE UP (ref 8.4–10.5)
CHLORIDE SERPL-SCNC: 94 MMOL/L — LOW (ref 96–108)
CO2 SERPL-SCNC: 25 MMOL/L — SIGNIFICANT CHANGE UP (ref 22–31)
CREAT ?TM UR-MCNC: 145 MG/DL — SIGNIFICANT CHANGE UP
CREAT SERPL-MCNC: 0.7 MG/DL — SIGNIFICANT CHANGE UP (ref 0.5–1.3)
EXTRA URINE TUBE: SIGNIFICANT CHANGE UP
GLUCOSE SERPL-MCNC: 99 MG/DL — SIGNIFICANT CHANGE UP (ref 70–99)
OSMOLALITY SERPL: 277 MOSM/KG — LOW (ref 280–301)
OSMOLALITY UR: 821 MOSMOL/KG — HIGH (ref 100–650)
POTASSIUM SERPL-MCNC: 4.4 MMOL/L — SIGNIFICANT CHANGE UP (ref 3.5–5.3)
POTASSIUM SERPL-SCNC: 4.4 MMOL/L — SIGNIFICANT CHANGE UP (ref 3.5–5.3)
SODIUM SERPL-SCNC: 131 MMOL/L — LOW (ref 135–145)
SODIUM UR-SCNC: 82 MMOL/L — SIGNIFICANT CHANGE UP

## 2018-05-11 PROCEDURE — 36415 COLL VENOUS BLD VENIPUNCTURE: CPT

## 2018-05-11 PROCEDURE — 90471 IMMUNIZATION ADMIN: CPT

## 2018-05-11 PROCEDURE — 99285 EMERGENCY DEPT VISIT HI MDM: CPT | Mod: 25

## 2018-05-11 PROCEDURE — 85025 COMPLETE CBC W/AUTO DIFF WBC: CPT

## 2018-05-11 PROCEDURE — 80307 DRUG TEST PRSMV CHEM ANLYZR: CPT

## 2018-05-11 PROCEDURE — 13132 CMPLX RPR F/C/C/M/N/AX/G/H/F: CPT | Mod: 59

## 2018-05-11 PROCEDURE — 93005 ELECTROCARDIOGRAM TRACING: CPT | Mod: XU

## 2018-05-11 PROCEDURE — 85027 COMPLETE CBC AUTOMATED: CPT

## 2018-05-11 PROCEDURE — 80053 COMPREHEN METABOLIC PANEL: CPT

## 2018-05-11 PROCEDURE — 83930 ASSAY OF BLOOD OSMOLALITY: CPT

## 2018-05-11 PROCEDURE — 80048 BASIC METABOLIC PNL TOTAL CA: CPT

## 2018-05-11 PROCEDURE — 13133 CMPLX RPR F/C/C/M/N/AX/G/H/F: CPT | Mod: 59

## 2018-05-11 PROCEDURE — 83935 ASSAY OF URINE OSMOLALITY: CPT

## 2018-05-11 PROCEDURE — 82570 ASSAY OF URINE CREATININE: CPT

## 2018-05-11 PROCEDURE — 81001 URINALYSIS AUTO W/SCOPE: CPT

## 2018-05-11 PROCEDURE — 84300 ASSAY OF URINE SODIUM: CPT

## 2018-05-11 PROCEDURE — 80076 HEPATIC FUNCTION PANEL: CPT

## 2018-05-11 PROCEDURE — 80074 ACUTE HEPATITIS PANEL: CPT

## 2018-05-11 PROCEDURE — 29125 APPL SHORT ARM SPLINT STATIC: CPT

## 2018-05-11 PROCEDURE — 82533 TOTAL CORTISOL: CPT

## 2018-05-11 PROCEDURE — 71046 X-RAY EXAM CHEST 2 VIEWS: CPT

## 2018-05-11 PROCEDURE — 90715 TDAP VACCINE 7 YRS/> IM: CPT

## 2018-05-11 PROCEDURE — 84443 ASSAY THYROID STIM HORMONE: CPT

## 2018-05-11 PROCEDURE — 84550 ASSAY OF BLOOD/URIC ACID: CPT

## 2018-05-11 PROCEDURE — 83735 ASSAY OF MAGNESIUM: CPT

## 2018-05-11 PROCEDURE — 84540 ASSAY OF URINE/UREA-N: CPT

## 2018-05-11 PROCEDURE — 84560 ASSAY OF URINE/URIC ACID: CPT

## 2018-05-11 PROCEDURE — 99233 SBSQ HOSP IP/OBS HIGH 50: CPT

## 2018-05-11 RX ORDER — ESCITALOPRAM OXALATE 10 MG/1
10 TABLET, FILM COATED ORAL DAILY
Qty: 0 | Refills: 0 | Status: DISCONTINUED | OUTPATIENT
Start: 2018-05-11 | End: 2018-05-11

## 2018-05-11 RX ORDER — LIDOCAINE 4 G/100G
1 CREAM TOPICAL DAILY
Qty: 0 | Refills: 0 | Status: DISCONTINUED | OUTPATIENT
Start: 2018-05-11 | End: 2018-05-16

## 2018-05-11 RX ORDER — LANOLIN ALCOHOL/MO/W.PET/CERES
3 CREAM (GRAM) TOPICAL AT BEDTIME
Qty: 0 | Refills: 0 | Status: DISCONTINUED | OUTPATIENT
Start: 2018-05-11 | End: 2018-05-16

## 2018-05-11 RX ORDER — LANOLIN ALCOHOL/MO/W.PET/CERES
5 CREAM (GRAM) TOPICAL ONCE
Qty: 0 | Refills: 0 | Status: COMPLETED | OUTPATIENT
Start: 2018-05-11 | End: 2018-05-11

## 2018-05-11 RX ADMIN — Medication 1 APPLICATION(S): at 22:49

## 2018-05-11 RX ADMIN — LIDOCAINE 1 APPLICATION(S): 4 CREAM TOPICAL at 05:56

## 2018-05-11 RX ADMIN — QUETIAPINE FUMARATE 50 MILLIGRAM(S): 200 TABLET, FILM COATED ORAL at 22:48

## 2018-05-11 RX ADMIN — Medication 5 MILLIGRAM(S): at 03:39

## 2018-05-11 RX ADMIN — Medication 1 APPLICATION(S): at 05:56

## 2018-05-11 NOTE — PROGRESS NOTE BEHAVIORAL HEALTH - PRIMARY DX
Current severe episode of major depressive disorder without psychotic features without prior episode

## 2018-05-11 NOTE — PROGRESS NOTE ADULT - PROBLEM SELECTOR PLAN 2
Management as per primary team.
Suicide attempt as above with left wrist injury. S/p suturing by plastic surgery Dr. Jo with severe tendon damage. No pain.   -Serial examinations of left wrist/ fingers  -f/u Psych: 1:1 constant observation, and transfer once medically cleared.
Suicide attempt as above with left wrist injury. S/p suturing by plastic surgery Dr. Jo with severe tendon damage. No pain.   -f/u plastics Dr. Jo  -Serial examinations of left wrist/ fingers  -f/u Psych: 1:1 constant observation, and transfer once medically cleared.
Patient is planned to go to go to [facility] after medically cleared. Management as per primary team.
Suicide attempt as above with left wrist injury. S/p suturing by plastic surgery Dr. Jo with severe tendon damage. No pain.   -Serial examinations of left wrist/ fingers  -f/u Psych: 1:1 constant observation, and transfer once medically cleared.

## 2018-05-11 NOTE — PROGRESS NOTE ADULT - PROVIDER SPECIALTY LIST ADULT
Hand Surgery
Hospitalist
Internal Medicine
Nephrology

## 2018-05-11 NOTE — PROGRESS NOTE ADULT - NEUROLOGICAL DETAILS
responds to verbal commands/alert and oriented x 3
alert and oriented x 3/responds to verbal commands
alert and oriented x 3/responds to verbal commands
responds to verbal commands/alert and oriented x 3

## 2018-05-11 NOTE — PROGRESS NOTE BEHAVIORAL HEALTH - THOUGHT CONTENT
Guilt/Ruminations/Hopelessness/Suicidality
Ruminations/Hopelessness/Guilt/Suicidality
Ruminations/Suicidality/Hopelessness/Guilt
Suicidality/Guilt/Ruminations/Hopelessness
Hopelessness/Guilt/Ruminations/Suicidality
Hopelessness/Guilt/Suicidality/Ruminations

## 2018-05-11 NOTE — PROGRESS NOTE ADULT - SUBJECTIVE AND OBJECTIVE BOX
INTERVAL HPI/OVERNIGHT EVENTS: NETO. Melatonin overnight for insomnia.    SUBJECTIVE: Patient seen and examined at bedside. No fever, chills, palpitations, CP, n/v or diarrhea. No suicidal ideations.     OBJECTIVE:    VITAL SIGNS:  ICU Vital Signs Last 24 Hrs  T(C): 36.7 (11 May 2018 08:52), Max: 37.2 (10 May 2018 20:34)  T(F): 98 (11 May 2018 08:52), Max: 99 (10 May 2018 20:34)  HR: 71 (11 May 2018 08:52) (71 - 100)  BP: 110/71 (11 May 2018 08:52) (104/67 - 119/85)  BP(mean): 79 (10 May 2018 15:24) (79 - 79)  ABP: --  ABP(mean): --  RR: 17 (11 May 2018 08:52) (17 - 18)  SpO2: 98% (11 May 2018 08:52) (96% - 100%)        05-10 @ 07:01  -   @ 07:00  --------------------------------------------------------  IN: 360 mL / OUT: 0 mL / NET: 360 mL      CAPILLARY BLOOD GLUCOSE          PHYSICAL EXAM:    General: NAD  HEENT: NC/AT; PERRL, clear conjunctiva  Neck: supple  Respiratory: CTA b/l  Cardiovascular: +S1/S2; RRR  Abdomen: soft, NT/ND; +BS x4  Extremities: WWP, 2+ peripheral pulses b/l; no LE edema  Skin: normal color and turgor; left inguinal intergrigo  Neurological: AAOx3, no focal neuro deficits.     MEDICATIONS:  MEDICATIONS  (STANDING):  clotrimazole 1% Cream 1 Application(s) Topical two times a day    MEDICATIONS  (PRN):  acetaminophen   Tablet. 650 milliGRAM(s) Oral every 6 hours PRN Mild Pain (1 - 3)  lidocaine 2% Gel 1 Application(s) Topical daily PRN ulcer pain      ALLERGIES:  Allergies    No Known Allergies    Intolerances        LABS:        131<L>  |  94<L>  |  20  ----------------------------<  99  4.4   |  25  |  0.70    Ca    9.0      11 May 2018 06:25        Urinalysis Basic - ( 10 May 2018 08:28 )    Color: Yellow / Appearance: Cloudy / S.010 / pH: x  Gluc: x / Ketone: NEGATIVE  / Bili: Negative / Urobili: 0.2 E.U./dL   Blood: x / Protein: NEGATIVE mg/dL / Nitrite: NEGATIVE   Leuk Esterase: NEGATIVE / RBC: 5-10 /HPF / WBC < 5 /HPF   Sq Epi: x / Non Sq Epi: 0-5 /HPF / Bacteria: Present /HPF        RADIOLOGY & ADDITIONAL TESTS: Reviewed. INTERVAL HPI/OVERNIGHT EVENTS: NETO. Melatonin overnight for insomnia.    SUBJECTIVE: Patient seen and examined at bedside. No fever, chills, palpitations, CP, n/v or diarrhea. No suicidal ideations. Patient is medically cleared for transfer to psychiatry.     OBJECTIVE:    VITAL SIGNS:  ICU Vital Signs Last 24 Hrs  T(C): 36.7 (11 May 2018 08:52), Max: 37.2 (10 May 2018 20:34)  T(F): 98 (11 May 2018 08:52), Max: 99 (10 May 2018 20:34)  HR: 71 (11 May 2018 08:52) (71 - 100)  BP: 110/71 (11 May 2018 08:52) (104/67 - 119/85)  BP(mean): 79 (10 May 2018 15:24) (79 - 79)  ABP: --  ABP(mean): --  RR: 17 (11 May 2018 08:52) (17 - 18)  SpO2: 98% (11 May 2018 08:52) (96% - 100%)        05-10 @ 07:01  -   @ 07:00  --------------------------------------------------------  IN: 360 mL / OUT: 0 mL / NET: 360 mL      CAPILLARY BLOOD GLUCOSE          PHYSICAL EXAM:    General: NAD  HEENT: NC/AT; PERRL, clear conjunctiva  Neck: supple  Respiratory: CTA b/l  Cardiovascular: +S1/S2; RRR  Abdomen: soft, NT/ND; +BS x4  Extremities: WWP, 2+ peripheral pulses b/l; no LE edema  Skin: normal color and turgor; left inguinal intergrigo  Neurological: AAOx3, no focal neuro deficits.     MEDICATIONS:  MEDICATIONS  (STANDING):  clotrimazole 1% Cream 1 Application(s) Topical two times a day    MEDICATIONS  (PRN):  acetaminophen   Tablet. 650 milliGRAM(s) Oral every 6 hours PRN Mild Pain (1 - 3)  lidocaine 2% Gel 1 Application(s) Topical daily PRN ulcer pain      ALLERGIES:  Allergies    No Known Allergies    Intolerances        LABS:        131<L>  |  94<L>  |  20  ----------------------------<  99  4.4   |  25  |  0.70    Ca    9.0      11 May 2018 06:25        Urinalysis Basic - ( 10 May 2018 08:28 )    Color: Yellow / Appearance: Cloudy / S.010 / pH: x  Gluc: x / Ketone: NEGATIVE  / Bili: Negative / Urobili: 0.2 E.U./dL   Blood: x / Protein: NEGATIVE mg/dL / Nitrite: NEGATIVE   Leuk Esterase: NEGATIVE / RBC: 5-10 /HPF / WBC < 5 /HPF   Sq Epi: x / Non Sq Epi: 0-5 /HPF / Bacteria: Present /HPF        RADIOLOGY & ADDITIONAL TESTS: Reviewed.

## 2018-05-11 NOTE — PROGRESS NOTE ADULT - PROBLEM SELECTOR PROBLEM 2
Depression
Suicide attempt
Depression
Suicide attempt

## 2018-05-11 NOTE — PROGRESS NOTE ADULT - ATTENDING COMMENTS
pt seen and examined by me. case d/w housestaff. agree with VS, PE, assessment and plan as outlined above
Agree with above
Euvolemic hyponatremia suspicious for SIADH, please check serum and urine uric acid and UrNa/Osm daily. Long discussion with patient to give at least a 24hr trial of <1L fluid restriction as he has not been compliant, he understands importance and will restrict starting this evening. Reports low urine volumes, hesitancy, check PVR. Admits to taking "many" different herbal remedies with increasing frequency leading up to hospitalization in desperation trying to find a sleep aid as he hasn't slept in "weeks." Asked him to have his wife bring in all the supplements so we can examine if any may cause SIADH. Liberalize Na in diet and add protein shakes.
SIADH with improving Na on <1L/day fluid restriction, bmp daily, will cont to monitor while under psychiatric care
hyponatremia with SIADH physiology  etiology unclear at this time  clinical improvement of Na with fluid restriction  c/w present conservative management  repeat chem later in day, with repeat urine Na and urine osm

## 2018-05-11 NOTE — PROGRESS NOTE BEHAVIORAL HEALTH - NSBHCONSULTPSYCHPLAN_PSY_A_CORE FT
suicide attempt: start Lexapro 10 mg PO daily, Seroquel 50 mg PO HS, monitor Na for worsening hyponatremia
Major depressive episode - start Lexapro 10 mg qday  Insomnia - [t with good response to Melatonin 5 mg qhs

## 2018-05-11 NOTE — PROGRESS NOTE ADULT - CONSTITUTIONAL DETAILS
well-developed/well-groomed/well-nourished
well-developed/well-groomed/well-nourished
well-groomed/well-nourished/well-developed
well-developed/well-nourished/well-groomed

## 2018-05-11 NOTE — PROGRESS NOTE ADULT - PROBLEM SELECTOR PLAN 5
-s/p Suicide attempt with multiple stressors as above. Negative urine tox, salicylate level,   -Holding lexapro 10qd and seroquel 50qhs in the setting of SIADH work-up  -f/u psychiatry: Transfer to -[facility] once sodium improves and bed available.
-s/p Suicide attempt with multiple stressors as above. Negative urine tox, salicylate level,   -Holding lexapro 10qd and seroquel 50qhs in the setting of SIADH work-up  -f/u psychiatry: Transfer to -[facility] once sodium improves and bed available.
-s/p Suicide attempt with multiple stressors as above. Negative urine tox, salicylate level,   -Holding lexapro 10qd and seroquel 50qhs in the setting of SIADH work-up  -f/u psychiatry: Transfer to -[facility] once sodium improves and bed available.
Mild transaminiits on admission labs.   -HCV Ab positive, HCV RNA pending
Mild transaminiits on admission labs.   -HCV Ab positive, HCV RNA pending

## 2018-05-11 NOTE — PROGRESS NOTE ADULT - PROBLEM SELECTOR PLAN 1
-initially hypovolemic hyponatremia s/p 2 liters. Serum osmolality low, increased urine osmolality. Small improvement of 121-122 after 2 liters of fluids. Patient asymptomatic, increasing likelyhood of non-acute hyponatremia.   -currently SIADH picture, renal consulted  -IVF discontinued  -c/w fluid restriction   -f/u BMP  -Avoid over-correction, no more than 6-8 mEq over 24 hours.  -Renal to follow at [facility], will need daily BMP, serum osmolarity, urine Cr, urine Na and urine osmolarity -initially hypovolemic hyponatremia s/p 2 liters. Serum osmolality low, increased urine osmolality. Small improvement of 121-122 after 2 liters of fluids. Patient asymptomatic, increasing likelyhood of non-acute hyponatremia.   -switched to SIADH during hospital stay, renal consulted  -SIADH currently stable  -c/w fluid restriction   -f/u BMP  -Renal to follow at [facility], will need daily BMP, serum osmolarity, urine Cr, urine Na and urine osmolarity

## 2018-05-11 NOTE — PROGRESS NOTE BEHAVIORAL HEALTH - RISK ASSESSMENT
Patient is at acute risk of harm to self due to his recent lethal attempt, feelings of guilt, shame and hopelessness especially in regards to the seriousness of his financial difficulties as well as his gender and race, and lack of outpatient treatment. He will require eventual transfer for inpatient psychiatric hospitalization for his safety when he is sufficiently medically stable for treatment on psychiatric unit. At this time, due to unstable hyponatremia, left forearm injury secondary to his suicide attempt requiring surgical repair, transfer to psychiatric inpatient unit is deferred.
Patient is at acute risk of harm to self due to his recent lethal attempt, feelings of guilt, shame and hopelessness especially in regards to the seriousness of his financial difficulties as well as his gender and race, and lack of outpatient treatment. He will require eventual transfer for inpatient psychiatric hospitalization for his safety when he is sufficiently medically stable for treatment on psychiatric unit. At this time, due to unstable hyponatremia, left forearm injury secondary to his suicide attempt requiring surgical repair, transfer to psychiatric inpatient unit is deferred.
Patient is at acute risk of harm to self given this morning's attempt, feelings of entrapment and ruminative flooding, the seriousness of his financial situation, his ambivalence and intense guilt after this morning's attempt, his gender and race, and lack of outpatient treatment. He require inpatient hospitalization for his safety.

## 2018-05-11 NOTE — PROGRESS NOTE BEHAVIORAL HEALTH - MUSCLE TONE / STRENGTH
Normal muscle tone/strength
Other
Normal muscle tone/strength
Normal muscle tone/strength
Other
Normal muscle tone/strength

## 2018-05-11 NOTE — PROGRESS NOTE ADULT - PROBLEM SELECTOR PROBLEM 3
Wrist laceration, left, initial encounter

## 2018-05-11 NOTE — PROGRESS NOTE ADULT - PROBLEM SELECTOR PLAN 1
Patient is a 58 year old male with hyponatremia likely from SIADH. Upon questioning, patient admits that he has been taking multiple over the counter "supplements" and herbal medications. Patient does not remember the name of these supplements and herbal medications, but one of these may be the culprit for SIADH. Sodium stable at 131. Patient's urine lytes suggest patient continues to have an on going ADH effect with urine sodium of 82 and urine osmolality of 821     P - Would continue with 1L fluid restriction    Patient can be discharged to [facility]   Please have patient's labs checked qdaily including a BMP and urine lytes (urine Na and Urine osm)  Nephrology team will follow patient while he is in [facility].   Also educated patient, to no longer take these supplements after he is discharged home. Told him to discuss with his primary care physician prior to starting any over the counter supplements and herbal remedies.    Please monitor strict I/Os

## 2018-05-11 NOTE — PROGRESS NOTE BEHAVIORAL HEALTH - PROBLEM SELECTOR PLAN 1
Lexapro 10 mg qday  Monitor for possible hypomanic/manic presentation as pt appears somewhat restless on exam. No clear hx/o manic symptoms.

## 2018-05-11 NOTE — PROGRESS NOTE ADULT - ASSESSMENT
57 yo old male with suicide attempt left wrist laceration admitted to medicine due to hypovolemic hyponatremia 2/2 decreased PO intake, later found to have SIADH with plan to transfer to [facility] psychiatry once medically cleared.
57 yo old male with suicide attempt left wrist laceration admitted to medicine due to hypovolemic hyponatremia 2/2 decreased PO intake, later found to have SIADH with plan to transfer to [facility] psychiatry once medically cleared.
59 yo old male with suicide attempt left wrist laceration admitted to medicine due to hypovolemic hyponatremia 2/2 decreased PO intake with plan to transfer to 8-[facility] psychiatry once medically cleared.
59 yo old male with suicide attempt left wrist laceration admitted to medicine due to hypovolemic hyponatremia 2/2 decreased PO intake with plan to transfer to 8-[facility] psychiatry once medically cleared and bed-available    1. Hyponatremia - likely chronic. asymptomatic  Na today 128 (yesterday 121).  IVF stopped. will start again tomorow.    2. psychiatry - f/u reccs. transfer to [facility] when Na wnl.    3. stable.
57 yo old male with suicide attempt left wrist laceration admitted to medicine due to hypovolemic hyponatremia 2/2 decreased PO intake with plan to transfer to 8-[facility] psychiatry once medically cleared.
Patient is a 58 year old male with a history of alcohol abuse but has been sober for 20 years and depression for whom nephrology was consulted for hyponatremia
59 yo old male with suicide attempt left wrist laceration admitted to medicine due to hypovolemic hyponatremia 2/2 decreased PO intake, later found to have SIADH with plan to transfer to [facility] psychiatry once medically cleared.
Patient is a 58 year old male with a history of alcohol abuse but has been sober for 20 years and depression for whom nephrology was consulted for hyponatremia

## 2018-05-11 NOTE — PROGRESS NOTE BEHAVIORAL HEALTH - NSBHADMITCOUNSEL_PSY_A_CORE
prognosis/instructions for management, treatment and follow up/risk factor reduction/diagnostic results/impressions and/or recommended studies/importance of adherence to chosen treatment/risks and benefits of treatment options/client/family/caregiver education
diagnostic results/impressions and/or recommended studies/risks and benefits of treatment options/instructions for management, treatment and follow up/risk factor reduction/importance of adherence to chosen treatment/client/family/caregiver education/prognosis
client/family/caregiver education/diagnostic results/impressions and/or recommended studies/risks and benefits of treatment options/instructions for management, treatment and follow up/risk factor reduction/importance of adherence to chosen treatment/prognosis
instructions for management, treatment and follow up/diagnostic results/impressions and/or recommended studies/risks and benefits of treatment options/client/family/caregiver education/prognosis/importance of adherence to chosen treatment/risk factor reduction
diagnostic results/impressions and/or recommended studies/importance of adherence to chosen treatment/prognosis/risks and benefits of treatment options/client/family/caregiver education/instructions for management, treatment and follow up/risk factor reduction

## 2018-05-11 NOTE — PROGRESS NOTE ADULT - SUBJECTIVE AND OBJECTIVE BOX
Patient seen and examined at bedside. Patient is a 58 year old male with a history of alcohol abuse but has been sober for 20 years and depression for whom nephrology was consulted for hyponatremia. Patient is comfortable and in no acute distress. Patient denies any shortness of breath or cough. Sodium today 131.     acetaminophen   Tablet. 650 milliGRAM(s) every 6 hours PRN  clotrimazole 1% Cream 1 Application(s) two times a day  lidocaine 2% Gel 1 Application(s) daily PRN    Allergies    No Known Allergies    Intolerances    T(C): , Max: 37.2 (05-10-18 @ 20:34)  T(F): , Max: 99 (05-10-18 @ 20:34)  HR: 71 (18 @ 08:52)  BP: 110/71 (18 @ 08:52)  BP(mean): 79 (05-10-18 @ 15:24)  RR: 17 (18 @ 08:52)  SpO2: 98% (18 @ 08:52)    05-10 @ 07:01  -   @ 07:00  --------------------------------------------------------  IN:    Oral Fluid: 360 mL  Total IN: 360 mL    OUT:  Total OUT: 0 mL    Total NET: 360 mL    LABS:        131<L>  |  94<L>  |  20  ----------------------------<  99  4.4   |  25  |  0.70    Ca    9.0      11 May 2018 06:25    Osmolality, Serum: 277 mosm/kg <L> [280 - 301] ( @ 06:25)    Urinalysis Basic - ( 10 May 2018 08:28 )    Color: Yellow / Appearance: Cloudy / S.010 / pH: x  Gluc: x / Ketone: NEGATIVE  / Bili: Negative / Urobili: 0.2 E.U./dL   Blood: x / Protein: NEGATIVE mg/dL / Nitrite: NEGATIVE   Leuk Esterase: NEGATIVE / RBC: 5-10 /HPF / WBC < 5 /HPF   Sq Epi: x / Non Sq Epi: 0-5 /HPF / Bacteria: Present /HPF    Osmolality, Random Urine: 821 mosmol/kg <H> [100 - 650] ( @ 09:52)  Sodium, Random Urine: 82 mmol/L ( @ 09:52)

## 2018-05-11 NOTE — PROGRESS NOTE BEHAVIORAL HEALTH - NSBHCONSULTFOLLOW_PSY_A_CORE
yes
yes
N/A - patient requires inpatient psychiatric admission
yes
yes
N/A - patient requires inpatient psychiatric admission

## 2018-05-11 NOTE — PROGRESS NOTE BEHAVIORAL HEALTH - NSBHPTASSESSDT_PSY_A_CORE
06-May-2018 09:00
07-May-2018 13:45
08-May-2018 09:30
07-May-2018 13:45
10-Viraj-2018 10:00
11-May-1800 13:30

## 2018-05-11 NOTE — PROGRESS NOTE ADULT - RS GEN PE MLT RESP DETAILS PC
clear to auscultation bilaterally/breath sounds equal/good air movement/airway patent/normal/respirations non-labored
airway patent/good air movement/clear to auscultation bilaterally/breath sounds equal/respirations non-labored/normal
clear to auscultation bilaterally/breath sounds equal/good air movement/airway patent/respirations non-labored/normal
good air movement/clear to auscultation bilaterally/respirations non-labored/normal/breath sounds equal/airway patent

## 2018-05-11 NOTE — PROGRESS NOTE ADULT - PROBLEM SELECTOR PROBLEM 1
Hyponatremia with decreased serum osmolality
SIADH (syndrome of inappropriate ADH production)
SIADH (syndrome of inappropriate ADH production)
Hyponatremia with decreased serum osmolality
SIADH (syndrome of inappropriate ADH production)
Hyponatremia with decreased serum osmolality

## 2018-05-11 NOTE — PROGRESS NOTE BEHAVIORAL HEALTH - NSBHCONSULTMEDPLAN_PSY_A_CORE FT
hyponatremia: medicine consult to follow, daily Na check, fluid restriction to 1.2 L daily  wrist tendon injury: Please contact Dr. Jo to determine appropriate intervention [phone]  HCV positive: outpatient follow up
see above  Renal will follow to address hyponatremia  Pt will require 1000ml fluid restriction

## 2018-05-11 NOTE — PROGRESS NOTE ADULT - PROBLEM SELECTOR PROBLEM 5
Current severe episode of major depressive disorder without psychotic features without prior episode
Transaminitis
Transaminitis

## 2018-05-11 NOTE — PROGRESS NOTE ADULT - PROBLEM SELECTOR PLAN 4
Mild transaminiits on admission labs 2/2 HCV infection  -HCV Ab positive, HCV RNA postive  -outpatient follow-up with Dr. Silva
-s/p Suicide attempt with multiple stressors as above. Negative urine tox, salicylate level,   -Holding lexapro 10qd and seroquel 50qhs in the setting of SIADH work-up  -f/u psychiatry: Transfer to -[facility] once sodium improves and bed available.
Mild transaminiits on admission labs 2/2 HCV infection  -HCV Ab positive, HCV RNA postive  -outpatient follow-up with Dr. Silva
Mild transaminiits on admission labs 2/2 HCV infection  -HCV Ab positive, HCV RNA postive  -outpatient follow-up with Dr. Silva
s/p Suicide attempt with multiple stressors as above. Negative urine tox, salicylate level,   -f/u psychiatry: Transfer to -[facility] once sodium improves and bed available.

## 2018-05-11 NOTE — PROGRESS NOTE ADULT - PROBLEM SELECTOR PLAN 3
-s/p plastic surgery repair  -please do not discharge pt without calling Dr Jo ([phone]); keep splint on and arm elevated at all times  -f/u plastics Dr. Jo  -need follow-up evaluation with Dr. Jo in two weeks
-s/p plastic surgery repair  -need follow-up evaluation with Dr. Jo in two weeks
-s/p plastic surgery repair  -please do not discharge pt without calling Dr Jo ([phone]); keep splint on and arm elevated at all times  -f/u plastics Dr. Jo  -need follow-up evaluation with Dr. Jo in two weeks
s/p plastic surgery repair, follow up recs
s/p plastic surgery repair, follow up recs

## 2018-05-11 NOTE — PROGRESS NOTE BEHAVIORAL HEALTH - NSBHCONSULTIPREASON_PSY_A_CORE
danger to self; mental illness expected to respond to inpatient care
danger to self; mental illness expected to respond to inpatient care

## 2018-05-11 NOTE — PROGRESS NOTE BEHAVIORAL HEALTH - NSBHCONSULTMEDS_PSY_A_CORE FT
lexapro 10 mg PO qdaily   seroquel 50 mg PO QHS
1. Start Lexapro 10 mg PO daily  2. Start Seroquel 50 mg PO HS
lexapro 10 mg PO qdaily
1. Start Lexapro 10 mg PO daily  2. Start Seroquel 50 mg PO HS
Would consider Seroquel 50 mg PO HS- unliekly to contribute to hyponatremia, Otherwise could trial trazodone 50 mg pO HS

## 2018-05-11 NOTE — PROGRESS NOTE BEHAVIORAL HEALTH - NSBHCONSULTOBSREASON_PSY_A_CORE FT
1:1 for safety in context of recent lethal suicide attempt
Suicidality & elopement precaution, requires [facility] transfer when medically cleared
No need for 1:1 on [facility]
Suicidality & elopement precaution
Suicidality & elopement precaution, requires [facility] transfer when medically cleared
Suicidality & elopement precaution, requires [facility] transfer when medically cleared

## 2018-05-11 NOTE — PROGRESS NOTE BEHAVIORAL HEALTH - NSBHFUPINTERVALHXFT_PSY_A_CORE
Mr. Aguilar is a 58 year old white male, with past history of alcohol use disorder in full sustained remission, no formal past psychiatric history, admitted to medicine after presenting to ED post suicide attempt by cutting his left wrist with a knife, requiring suturing. Pt was found to have HCV and hyponatremia (Na 121 on admission), now diagnosed with SIADH during medical admission. Hyponatremia has been resolving on fluid restriction.    This morning Mr. Aguilar reports feelings of embarrassment and disbelief regarding his suicide attempt. He is regretting his actions, stating "I just made everything worse". Pt is agreeable to psychiatric admission. He is currently unable to verbalize how the situation has changed. He continues to report ongoing financial stressors. Reports sleeping better since being hospitalized. Pt believes that multiple herbal supplements (>10) were contributing to his inability to sleep. Pt is motivated for outpt treatment. He denies active suicidal ideation/plan or intent. However given seriousness of suicide attempt pt will benefit from inpatient hospitalization.

## 2018-05-11 NOTE — PROGRESS NOTE BEHAVIORAL HEALTH - NSBHFUPINTERVALCCFT_PSY_A_CORE
"I feel OK"
"It's getting hard"
"so what comes next?"
Suicide attempt:  "I don't know what I was thinking."
"It's getting hard"
"I don't know what I was thinking."

## 2018-05-11 NOTE — PROGRESS NOTE ADULT - PROBLEM SELECTOR PROBLEM 4
HCV infection
Current severe episode of major depressive disorder without psychotic features without prior episode
HCV infection
HCV infection
Current severe episode of major depressive disorder without psychotic features without prior episode

## 2018-05-11 NOTE — PROGRESS NOTE BEHAVIORAL HEALTH - NSBHCHARTREVIEWLAB_PSY_A_CORE FT
05-11    131<L>  |  94<L>  |  20  ----------------------------<  99  4.4   |  25  |  0.70    Ca    9.0      11 May 2018 06:25
5/5/18 Na 121--> 126
14.2   5.7   )-----------( 227      ( 07 May 2018 06:40 )             40.1   05-07    129<L>  |  92<L>  |  13  ----------------------------<  121<H>  4.7   |  24  |  0.68    Ca    9.0      07 May 2018 12:41  Phos  3.7     05-07  Mg     2.0     05-07    TPro  6.6  /  Alb  3.8  /  TBili  0.7  /  DBili  x   /  AST  41<H>  /  ALT  40  /  AlkPhos  35<L>  05-06
05-09    132<L>  |  93<L>  |  14  ----------------------------<  95  4.7   |  26  |  0.72    Ca    9.4      09 May 2018 07:25  Mg     2.0     05-08
14.2   5.7   )-----------( 227      ( 07 May 2018 06:40 )             40.1   05-08    128<L>  |  90<L>  |  12  ----------------------------<  133<H>  4.2   |  26  |  0.65    Ca    9.3      08 May 2018 10:37  Phos  3.7     05-07  Mg     2.1     05-08

## 2018-05-12 DIAGNOSIS — F32.9 MAJOR DEPRESSIVE DISORDER, SINGLE EPISODE, UNSPECIFIED: ICD-10-CM

## 2018-05-12 LAB
ANION GAP SERPL CALC-SCNC: 12 MMOL/L — SIGNIFICANT CHANGE UP (ref 5–17)
CHLORIDE SERPL-SCNC: 95 MMOL/L — LOW (ref 96–108)
CHOLEST SERPL-MCNC: 149 MG/DL — SIGNIFICANT CHANGE UP (ref 10–199)
CO2 SERPL-SCNC: 28 MMOL/L — SIGNIFICANT CHANGE UP (ref 22–31)
HBA1C BLD-MCNC: 5.5 % — SIGNIFICANT CHANGE UP (ref 4–5.6)
HDLC SERPL-MCNC: 55 MG/DL — SIGNIFICANT CHANGE UP (ref 40–125)
LIPID PNL WITH DIRECT LDL SERPL: 81 MG/DL — SIGNIFICANT CHANGE UP
OSMOLALITY SERPL: 284 MOSM/KG — SIGNIFICANT CHANGE UP (ref 280–301)
POTASSIUM SERPL-MCNC: 4.2 MMOL/L — SIGNIFICANT CHANGE UP (ref 3.5–5.3)
POTASSIUM SERPL-SCNC: 4.2 MMOL/L — SIGNIFICANT CHANGE UP (ref 3.5–5.3)
SODIUM SERPL-SCNC: 135 MMOL/L — SIGNIFICANT CHANGE UP (ref 135–145)
TOTAL CHOLESTEROL/HDL RATIO MEASUREMENT: 2.7 RATIO — LOW (ref 3.4–9.6)
TRIGL SERPL-MCNC: 65 MG/DL — SIGNIFICANT CHANGE UP (ref 10–149)

## 2018-05-12 PROCEDURE — 99231 SBSQ HOSP IP/OBS SF/LOW 25: CPT

## 2018-05-12 RX ADMIN — QUETIAPINE FUMARATE 50 MILLIGRAM(S): 200 TABLET, FILM COATED ORAL at 23:07

## 2018-05-12 RX ADMIN — Medication 1 APPLICATION(S): at 12:41

## 2018-05-12 RX ADMIN — Medication 1 APPLICATION(S): at 23:07

## 2018-05-12 RX ADMIN — ESCITALOPRAM OXALATE 10 MILLIGRAM(S): 10 TABLET, FILM COATED ORAL at 12:40

## 2018-05-12 NOTE — PROGRESS NOTE BEHAVIORAL HEALTH - NSBHFUPINTERVALHXFT_PSY_A_CORE
58 year old male who was recently transferred (voluntarily) from medicine after cutting his L forearm in a suicide attempt 2/2 financial stressors.  He refused to speak to this writer and remains on 1:1 for safety in the context of requiring to wear an ace bandage in the milieu.

## 2018-05-12 NOTE — PROGRESS NOTE BEHAVIORAL HEALTH - SUMMARY
58 year old male with recent SA and transfer from medicine refusing to speak to this writer and on 1:1 for requiring an ace bandage over injury

## 2018-05-13 PROCEDURE — 99231 SBSQ HOSP IP/OBS SF/LOW 25: CPT

## 2018-05-13 RX ADMIN — QUETIAPINE FUMARATE 50 MILLIGRAM(S): 200 TABLET, FILM COATED ORAL at 22:43

## 2018-05-13 RX ADMIN — Medication 1 APPLICATION(S): at 22:43

## 2018-05-13 RX ADMIN — ESCITALOPRAM OXALATE 10 MILLIGRAM(S): 10 TABLET, FILM COATED ORAL at 13:20

## 2018-05-13 NOTE — PROGRESS NOTE BEHAVIORAL HEALTH - NSBHFUPINTERVALHXFT_PSY_A_CORE
58 year old male s/p self inflicted damage to L forearm who continues to display PMR and depressed mood.  He reported to nursing he is feeling guilt for what he has done.  He continues to refuse to speak to this writer.

## 2018-05-14 DIAGNOSIS — E22.2 SYNDROME OF INAPPROPRIATE SECRETION OF ANTIDIURETIC HORMONE: ICD-10-CM

## 2018-05-14 DIAGNOSIS — F32.9 MAJOR DEPRESSIVE DISORDER, SINGLE EPISODE, UNSPECIFIED: ICD-10-CM

## 2018-05-14 DIAGNOSIS — B19.20 UNSPECIFIED VIRAL HEPATITIS C WITHOUT HEPATIC COMA: ICD-10-CM

## 2018-05-14 DIAGNOSIS — S69.92XD UNSPECIFIED INJURY OF LEFT WRIST, HAND AND FINGER(S), SUBSEQUENT ENCOUNTER: ICD-10-CM

## 2018-05-14 LAB
ALBUMIN SERPL ELPH-MCNC: 3.9 G/DL — SIGNIFICANT CHANGE UP (ref 3.3–5)
ALP SERPL-CCNC: 50 U/L — SIGNIFICANT CHANGE UP (ref 40–120)
ALT FLD-CCNC: 35 U/L — SIGNIFICANT CHANGE UP (ref 10–45)
ANION GAP SERPL CALC-SCNC: 8 MMOL/L — SIGNIFICANT CHANGE UP (ref 5–17)
AST SERPL-CCNC: 25 U/L — SIGNIFICANT CHANGE UP (ref 10–40)
BILIRUB SERPL-MCNC: 0.3 MG/DL — SIGNIFICANT CHANGE UP (ref 0.2–1.2)
BUN SERPL-MCNC: 18 MG/DL — SIGNIFICANT CHANGE UP (ref 7–23)
CALCIUM SERPL-MCNC: 9.1 MG/DL — SIGNIFICANT CHANGE UP (ref 8.4–10.5)
CHLORIDE SERPL-SCNC: 96 MMOL/L — SIGNIFICANT CHANGE UP (ref 96–108)
CO2 SERPL-SCNC: 30 MMOL/L — SIGNIFICANT CHANGE UP (ref 22–31)
CREAT SERPL-MCNC: 0.72 MG/DL — SIGNIFICANT CHANGE UP (ref 0.5–1.3)
GLUCOSE SERPL-MCNC: 124 MG/DL — HIGH (ref 70–99)
POTASSIUM SERPL-MCNC: 4.6 MMOL/L — SIGNIFICANT CHANGE UP (ref 3.5–5.3)
POTASSIUM SERPL-SCNC: 4.6 MMOL/L — SIGNIFICANT CHANGE UP (ref 3.5–5.3)
PROT SERPL-MCNC: 7.4 G/DL — SIGNIFICANT CHANGE UP (ref 6–8.3)
SODIUM SERPL-SCNC: 134 MMOL/L — LOW (ref 135–145)

## 2018-05-14 PROCEDURE — 99223 1ST HOSP IP/OBS HIGH 75: CPT

## 2018-05-14 RX ADMIN — ESCITALOPRAM OXALATE 10 MILLIGRAM(S): 10 TABLET, FILM COATED ORAL at 12:41

## 2018-05-14 RX ADMIN — Medication 1 APPLICATION(S): at 12:41

## 2018-05-14 RX ADMIN — QUETIAPINE FUMARATE 50 MILLIGRAM(S): 200 TABLET, FILM COATED ORAL at 22:38

## 2018-05-14 NOTE — BEHAVIORAL HEALTH ASSESSMENT NOTE - CASE SUMMARY
Pt is a 59yo MWM lives with his wife, works for electrical company, h/o hep c, siadh, h/o etoh use in sustained remission goes to [facility], transferred from medicine after pt was admissted s/p cutting wrists as SA.  Pt is currently remorseful and doesn't understand why he did that.  he feels like he was in a different state of mind.  he also adds that his biggest stressor at the time was not being able to sleep so he had been taking ambien and various supplements for about one week prior to cutting wrists.  He states that he is feeling "more level now" and is unable to express what he was thinkin at the time of the incident.  denies access to guns/weapons.  +FH of alcohol use. future-oriented with plans about short term disability followed by returning to work. presents calm, cooperative, pleasant, well-related, euthymic affect, denies si/hi/ah/vh, in behavioral control, good insight/judgement at this time.  labs/ekg/vs reviewed.  pt's SA appears to have been done in a delerious state likelly secodnary to ambien/supplements/lack of sleep.  pt is currenlty euthymic, stable and compliant with meds without side effects.  therefore, low acute risk of harm to self/others at this time.  will cont Lexapro 10mg daily (least likely ssri to cause hyponatremia), monitor cmp daily, maintain 1:1 since he has an ace bandage on the unit, f/u with his wife for additional collateral information  which pt gives verbal consent to do so (ernestina - [phone]), psychoeducation provided re: importance of avoiding supplements and Ambien.  igm tx.  discharge planing - consider discharge before surgical intervention on wed.  12 step meetings

## 2018-05-14 NOTE — BEHAVIORAL HEALTH ASSESSMENT NOTE - NSBHSUICPROTECTFACT_PSY_A_CORE
Future oriented/Supportive social network or family/Responsibility to family and others/Identifies reasons for living/Engaged in work or school/Ability to cope with stress

## 2018-05-14 NOTE — BEHAVIORAL HEALTH ASSESSMENT NOTE - NSBHADMITIPSTRENGTH_PSY_A_CORE
Has supportive interpersonal relationships with family, friends or peers/Steady employment/Attempting to realize his/her potential/Able to manage surrounding demands/opportunities/Has vocational interests or hobbies/Cooperative with treatment/Motivated and ready for change/Able to set and pursue goals/Able to exercise self-direction/Has access to housing/residential stability/Awareness of substance use issues

## 2018-05-14 NOTE — BEHAVIORAL HEALTH ASSESSMENT NOTE - RISK ASSESSMENT
Risk: older  male, not in outpatient treatment, serious suicide attempt  Protective: Supportive wife, engaged in work, future-oriented

## 2018-05-14 NOTE — BEHAVIORAL HEALTH ASSESSMENT NOTE - NSBHCHARTREVIEWVS_PSY_A_CORE FT
Vital Signs Last 24 Hrs  T(C): 36.9 (14 May 2018 09:08), Max: 36.9 (14 May 2018 09:08)  T(F): 98.5 (14 May 2018 09:08), Max: 98.5 (14 May 2018 09:08)  HR: 74 (14 May 2018 09:08) (74 - 77)  BP: 113/74 (14 May 2018 09:08) (113/74 - 128/81)  BP(mean): --  RR: 18 (14 May 2018 09:08) (16 - 18)  SpO2: --

## 2018-05-14 NOTE — BEHAVIORAL HEALTH ASSESSMENT NOTE - SUMMARY
59 yo  male, employed, domiciled with wife, with PPH of alcohol use disorder in remission, no prior hospitalizations, not currently in outpatient treatment, BIB wife to [facility] ED after suicide attempt via cutting his left wrist, subsequently admitted to medicine floor where he was found to have hyponatremia due to SIADH and also diagnosed with Hepatitis C on routine screening, and now medically cleared for transfer to [facility]. Patient's depression and suicide attempt were due to several psychosocial stressors compounded by self-medicating with supplements and Ambien. Both patient and wife report that he is doing much better throughout hospitalization and the patient is willing to engage in outpatient treatment. Patient was provided with psychosocial education regarding dangers of taking Ambien, and wife was contacted to remove it from the house. Discharge planning in progress. Patient continues to require inpatient hospitalization for safety, symptom stabilization, and medication management.

## 2018-05-14 NOTE — BEHAVIORAL HEALTH ASSESSMENT NOTE - HPI (INCLUDE ILLNESS QUALITY, SEVERITY, DURATION, TIMING, CONTEXT, MODIFYING FACTORS, ASSOCIATED SIGNS AND SYMPTOMS)
59 yo  male, employed, domiciled with wife, with PPH of alcohol use disorder in remission, no prior hospitalizations, not currently in outpatient treatment, BIB wife to [facility] ED after suicide attempt via cutting his left wrist, subsequently admitted to medicine floor where he was found to have hyponatremia due to SIADH and also diagnosed with Hepatitis C on routine screening, and now medically cleared for transfer to [facility]. On evaluation today, patient expressed remorse regarding his suicide attempt. He listed several triggers, including financial problems due to a rental property in the [facility] and feeling overwhelmed by working 2 jobs. As per patient and corroborated by wife, he has been taking amino acid supplements for a few months and recently started taking Ambien for the past week. His wife reports that he has been "amped up" on these supplements and not acting like himself. Patient reports that he has been sleeping poorly since January and was taking these pills in order to help with insomnia. Since he has been in the hospital, his wife reports that his behavior is closer to baseline now. The patient reports that he has been able to sleep better in the past few days since being off these supplements. He denies current suicidal ideation and listed his wife and 2 dogs as protective factors. He is future-oriented and would like to go back to work once his wrist heals from surgery. He describes his current mood as more hopeful. He denies paranoia, AVH, and HI.    Substances: Sober from alcohol since 1993. Attends AA meetings 3-4 times per week. Denies history of seizures or withdrawal symptoms. Denies tobacco and other recreational drug use.    PPH: No prior hospitalizations or medications. No prior suicide attempts.    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 As per wife, he was working 7 days a week which led to his breakdown.    Family history: Alcoholism in relatives    PMH: SIADH - now on fluid restriction. Recently diagnosed HCV

## 2018-05-14 NOTE — BEHAVIORAL HEALTH ASSESSMENT NOTE - NSBHCHARTREVIEWLAB_PSY_A_CORE FT
Lipid Profile in AM (05.12.18 @ 08:35)    Total Cholesterol/HDL Ratio Measurement: 2.7 RATIO    Cholesterol, Serum: 149 mg/dL    Triglycerides, Serum: 65 mg/dL    HDL Cholesterol, Serum: 55 mg/dL    Direct LDL: 81    Hemoglobin A1C, Whole Blood: 5.5    Comprehensive Metabolic Panel (05.14.18 @ 13:05)    Sodium, Serum: 134 mmol/L    Potassium, Serum: 4.6 mmol/L    Chloride, Serum: 96 mmol/L    Carbon Dioxide, Serum: 30 mmol/L    Anion Gap, Serum: 8 mmol/L    Blood Urea Nitrogen, Serum: 18 mg/dL    Creatinine, Serum: 0.72 mg/dL    Glucose, Serum: 124 mg/dL    Calcium, Total Serum: 9.1 mg/dL    Protein Total, Serum: 7.4 g/dL    Albumin, Serum: 3.9 g/dL    Bilirubin Total, Serum: 0.3 mg/dL    Alkaline Phosphatase, Serum: 50 U/L    Aspartate Aminotransferase (AST/SGOT): 25 U/L    Alanine Aminotransferase (ALT/SGPT): 35 U/L

## 2018-05-14 NOTE — BEHAVIORAL HEALTH ASSESSMENT NOTE - PROBLEM SELECTOR PLAN 2
-As per medicine discharge summary, patient will need GI follow up as outpatient. Medicine consulted and will see patient tomorrow.

## 2018-05-14 NOTE — BEHAVIORAL HEALTH ASSESSMENT NOTE - NSBHADMITCOUNSEL_PSY_A_CORE
instructions for management, treatment and follow up/risk factor reduction/prognosis/risks and benefits of treatment options/diagnostic results/impressions and/or recommended studies/importance of adherence to chosen treatment/client/family/caregiver education

## 2018-05-14 NOTE — BEHAVIORAL HEALTH ASSESSMENT NOTE - PROBLEM SELECTOR PLAN 4
-As per Dr. Jo, patient will undergo surgery this Wednesday.  -Still requires ace bandage so will maintain 1:1

## 2018-05-14 NOTE — BEHAVIORAL HEALTH ASSESSMENT NOTE - NSBHCHARTREVIEWINVESTIGATE_PSY_A_CORE FT
Ventricular Rate 65 BPM    Atrial Rate 65 BPM    P-R Interval 186 ms    QRS Duration 106 ms    Q-T Interval 418 ms    QTC Calculation(Bezet) 434 ms    P Axis 22 degrees    R Axis 89 degrees    T Axis 67 degrees    Diagnosis Line Normal sinus rhythm

## 2018-05-15 LAB
ALBUMIN SERPL ELPH-MCNC: 3.5 G/DL — SIGNIFICANT CHANGE UP (ref 3.3–5)
ALP SERPL-CCNC: 44 U/L — SIGNIFICANT CHANGE UP (ref 40–120)
ALT FLD-CCNC: 31 U/L — SIGNIFICANT CHANGE UP (ref 10–45)
ANION GAP SERPL CALC-SCNC: 9 MMOL/L — SIGNIFICANT CHANGE UP (ref 5–17)
AST SERPL-CCNC: 22 U/L — SIGNIFICANT CHANGE UP (ref 10–40)
BILIRUB SERPL-MCNC: 0.4 MG/DL — SIGNIFICANT CHANGE UP (ref 0.2–1.2)
BUN SERPL-MCNC: 15 MG/DL — SIGNIFICANT CHANGE UP (ref 7–23)
CALCIUM SERPL-MCNC: 9 MG/DL — SIGNIFICANT CHANGE UP (ref 8.4–10.5)
CHLORIDE SERPL-SCNC: 99 MMOL/L — SIGNIFICANT CHANGE UP (ref 96–108)
CO2 SERPL-SCNC: 28 MMOL/L — SIGNIFICANT CHANGE UP (ref 22–31)
CREAT SERPL-MCNC: 0.68 MG/DL — SIGNIFICANT CHANGE UP (ref 0.5–1.3)
GLUCOSE SERPL-MCNC: 93 MG/DL — SIGNIFICANT CHANGE UP (ref 70–99)
POTASSIUM SERPL-MCNC: 4.7 MMOL/L — SIGNIFICANT CHANGE UP (ref 3.5–5.3)
POTASSIUM SERPL-SCNC: 4.7 MMOL/L — SIGNIFICANT CHANGE UP (ref 3.5–5.3)
PROT SERPL-MCNC: 6.6 G/DL — SIGNIFICANT CHANGE UP (ref 6–8.3)
SODIUM SERPL-SCNC: 136 MMOL/L — SIGNIFICANT CHANGE UP (ref 135–145)

## 2018-05-15 PROCEDURE — 99254 IP/OBS CNSLTJ NEW/EST MOD 60: CPT

## 2018-05-15 PROCEDURE — 99232 SBSQ HOSP IP/OBS MODERATE 35: CPT

## 2018-05-15 RX ADMIN — ESCITALOPRAM OXALATE 10 MILLIGRAM(S): 10 TABLET, FILM COATED ORAL at 10:42

## 2018-05-15 RX ADMIN — QUETIAPINE FUMARATE 50 MILLIGRAM(S): 200 TABLET, FILM COATED ORAL at 22:52

## 2018-05-15 RX ADMIN — Medication 1 APPLICATION(S): at 22:53

## 2018-05-15 NOTE — PROGRESS NOTE BEHAVIORAL HEALTH - SUMMARY
59 yo  male, employed, domiciled with wife, with PPH of alcohol use disorder in remission, no prior hospitalizations, not currently in outpatient treatment, BIB wife to [facility] ED after suicide attempt via cutting his left wrist, subsequently admitted to medicine floor where he was found to have hyponatremia due to SIADH and also diagnosed with Hepatitis C on routine screening, and now medically cleared for transfer to [facility]. Patient's depression and suicide attempt were due to several psychosocial stressors compounded by self-medicating with supplements and Ambien. Both patient and wife report that he is doing much better throughout hospitalization and the patient is willing to engage in outpatient treatment. Patient was provided with psychosocial education regarding dangers of taking Ambien, and wife was contacted to remove it from the house. Discharge planning in progress. Patient continues to require inpatient hospitalization for safety, symptom stabilization, and medication management.

## 2018-05-15 NOTE — PROGRESS NOTE BEHAVIORAL HEALTH - RISK ASSESSMENT
Modifiable risk factors addressed during the hospitalization: not in outpatient treatment, low mood, insomnia, si (all addressed during hospitalization)  Static risk factors: older,  male, recent serious suicide attempt, h/o alcohol use, recent hep c  Protective: Supportive wife, engaged in work, future-oriented

## 2018-05-15 NOTE — PROGRESS NOTE BEHAVIORAL HEALTH - NSBHFUPINTERVALHXFT_PSY_A_CORE
Pt is a 57yo MWM lives with his wife, works for electrical company, h/o hep c, siadh, h/o etoh use in sustained remission goes to AA regularly, transferred from medicine after pt was admitted s/p cutting wrists as SA.  Pt is remains remorseful about his actions prior to admission.  He states that he is feeling "more level now" and is looking forward to going home. He slept well last night, +good appetite. attending groups. future-oriented with plans about short term disability followed by returning to work. presents calm, cooperative, pleasant, well-related, euthymic affect, denies si/hi/ah/vh, in behavioral control, good insight/judgement at this time.  labs/ekg/vs reviewed.  pt's SA appears to have been done in a delerious state likelly secodnary to ambien/supplements/lack of sleep.  pt is currently euthymic, stable and compliant with meds without side effects.  therefore, low acute risk of harm to self/others at this time.  will cont Lexapro 10mg daily (least likely ssri to cause hyponatremia), monitor cmp daily, renal has signed off and recommends continued fluid restriction and outpt follow up, maintain 1:1 since he has an ace bandage on the unit, appreciate collateral information from pt's wife (ernestina - [phone]), psychoeducation reinforced re: importance of avoiding supplements and Ambien.  igm tx.  discharge planing - will likely discharge before surgical intervention on wed.  12 step meetings.

## 2018-05-16 ENCOUNTER — INPATIENT (INPATIENT)
Facility: HOSPITAL | Age: 58
LOS: 0 days | Discharge: HOME | DRG: 501 | End: 2018-05-17
Attending: SURGERY | Admitting: SURGERY
Payer: COMMERCIAL

## 2018-05-16 ENCOUNTER — RESULT REVIEW (OUTPATIENT)
Age: 58
End: 2018-05-16

## 2018-05-16 VITALS
RESPIRATION RATE: 18 BRPM | DIASTOLIC BLOOD PRESSURE: 68 MMHG | HEART RATE: 71 BPM | TEMPERATURE: 98 F | SYSTOLIC BLOOD PRESSURE: 104 MMHG

## 2018-05-16 VITALS
TEMPERATURE: 97 F | OXYGEN SATURATION: 100 % | HEART RATE: 77 BPM | DIASTOLIC BLOOD PRESSURE: 54 MMHG | RESPIRATION RATE: 18 BRPM | SYSTOLIC BLOOD PRESSURE: 99 MMHG

## 2018-05-16 DIAGNOSIS — S69.92XS UNSPECIFIED INJURY OF LEFT WRIST, HAND AND FINGER(S), SEQUELA: Chronic | ICD-10-CM
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
## 2018-05-16 LAB
ANION GAP SERPL CALC-SCNC: 10 MMOL/L — SIGNIFICANT CHANGE UP (ref 5–17)
APTT BLD: 25.9 SEC — LOW (ref 27.5–37.4)
BUN SERPL-MCNC: 20 MG/DL — SIGNIFICANT CHANGE UP (ref 7–23)
CALCIUM SERPL-MCNC: 8.8 MG/DL — SIGNIFICANT CHANGE UP (ref 8.4–10.5)
CHLORIDE SERPL-SCNC: 99 MMOL/L — SIGNIFICANT CHANGE UP (ref 96–108)
CO2 SERPL-SCNC: 27 MMOL/L — SIGNIFICANT CHANGE UP (ref 22–31)
CREAT SERPL-MCNC: 0.79 MG/DL — SIGNIFICANT CHANGE UP (ref 0.5–1.3)
GLUCOSE SERPL-MCNC: 98 MG/DL — SIGNIFICANT CHANGE UP (ref 70–99)
HCT VFR BLD CALC: 41 % — SIGNIFICANT CHANGE UP (ref 39–50)
HGB BLD-MCNC: 14 G/DL — SIGNIFICANT CHANGE UP (ref 13–17)
INR BLD: 1.01 — SIGNIFICANT CHANGE UP (ref 0.88–1.16)
MAGNESIUM SERPL-MCNC: 2 MG/DL — SIGNIFICANT CHANGE UP (ref 1.6–2.6)
MCHC RBC-ENTMCNC: 32.8 PG — SIGNIFICANT CHANGE UP (ref 27–34)
MCHC RBC-ENTMCNC: 34.1 G/DL — SIGNIFICANT CHANGE UP (ref 32–36)
MCV RBC AUTO: 96 FL — SIGNIFICANT CHANGE UP (ref 80–100)
PLATELET # BLD AUTO: 257 K/UL — SIGNIFICANT CHANGE UP (ref 150–400)
POTASSIUM SERPL-MCNC: 4.7 MMOL/L — SIGNIFICANT CHANGE UP (ref 3.5–5.3)
POTASSIUM SERPL-SCNC: 4.7 MMOL/L — SIGNIFICANT CHANGE UP (ref 3.5–5.3)
PROTHROM AB SERPL-ACNC: 11.2 SEC — SIGNIFICANT CHANGE UP (ref 9.8–12.7)
RBC # BLD: 4.27 M/UL — SIGNIFICANT CHANGE UP (ref 4.2–5.8)
RBC # FLD: 12 % — SIGNIFICANT CHANGE UP (ref 10.3–16.9)
SODIUM SERPL-SCNC: 136 MMOL/L — SIGNIFICANT CHANGE UP (ref 135–145)
WBC # BLD: 7.6 K/UL — SIGNIFICANT CHANGE UP (ref 3.8–10.5)
WBC # FLD AUTO: 7.6 K/UL — SIGNIFICANT CHANGE UP (ref 3.8–10.5)

## 2018-05-16 PROCEDURE — 99233 SBSQ HOSP IP/OBS HIGH 50: CPT

## 2018-05-16 RX ORDER — ONDANSETRON 8 MG/1
4 TABLET, FILM COATED ORAL EVERY 6 HOURS
Qty: 0 | Refills: 0 | Status: DISCONTINUED | OUTPATIENT
Start: 2018-05-16 | End: 2018-05-17

## 2018-05-16 RX ORDER — OXYCODONE AND ACETAMINOPHEN 5; 325 MG/1; MG/1
1 TABLET ORAL EVERY 4 HOURS
Qty: 0 | Refills: 0 | Status: DISCONTINUED | OUTPATIENT
Start: 2018-05-16 | End: 2018-05-16

## 2018-05-16 RX ORDER — ONDANSETRON 8 MG/1
4 TABLET, FILM COATED ORAL ONCE
Qty: 0 | Refills: 0 | Status: DISCONTINUED | OUTPATIENT
Start: 2018-05-16 | End: 2018-05-17

## 2018-05-16 RX ORDER — QUETIAPINE FUMARATE 200 MG/1
1 TABLET, FILM COATED ORAL
Qty: 15 | Refills: 0 | OUTPATIENT
Start: 2018-05-16 | End: 2018-05-30

## 2018-05-16 RX ORDER — ESCITALOPRAM OXALATE 10 MG/1
1 TABLET, FILM COATED ORAL
Qty: 15 | Refills: 0 | OUTPATIENT
Start: 2018-05-16 | End: 2018-05-30

## 2018-05-16 RX ORDER — OXYCODONE AND ACETAMINOPHEN 5; 325 MG/1; MG/1
1 TABLET ORAL EVERY 4 HOURS
Qty: 0 | Refills: 0 | Status: DISCONTINUED | OUTPATIENT
Start: 2018-05-16 | End: 2018-05-17

## 2018-05-16 RX ORDER — OXYCODONE AND ACETAMINOPHEN 5; 325 MG/1; MG/1
2 TABLET ORAL EVERY 4 HOURS
Qty: 0 | Refills: 0 | Status: DISCONTINUED | OUTPATIENT
Start: 2018-05-16 | End: 2018-05-17

## 2018-05-16 RX ADMIN — ESCITALOPRAM OXALATE 10 MILLIGRAM(S): 10 TABLET, FILM COATED ORAL at 10:51

## 2018-05-16 NOTE — PROGRESS NOTE BEHAVIORAL HEALTH - AXIS III
Newly diagnosed Hepatitis C, [facility]
L forearm lacerations
L forearm lacerations
Newly diagnosed Hepatitis C, [facility]

## 2018-05-16 NOTE — PROGRESS NOTE BEHAVIORAL HEALTH - PRIMARY DX
Depressive disorder
Current severe episode of major depressive disorder without psychotic features without prior episode
Depressive disorder
Current severe episode of major depressive disorder without psychotic features without prior episode

## 2018-05-16 NOTE — PROGRESS NOTE BEHAVIORAL HEALTH - NSBHFUPINTERVALHXFT_PSY_A_CORE
Case was discussed with treatment team and chart was reviewed. Patient was compliant with all meds. He has been in good behavioral control and attends groups. His surgery is scheduled for 2 pm today. 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 He was counseled to follow up with [facility] for HCV and SIADH and with plastic surgery as instructed. Sodium normalized at 136 this morning. He remains remorseful about his suicide attempt. Patient reports okay mood, significantly improved sleep, and no problems with appetite.  Denies any manic/hypomanic symptoms, paranoid thoughts/delusions, perceptual disturbances, SI/HI. Case was discussed with treatment team and chart was reviewed. Patient was compliant with all meds. He has been in good behavioral control and attends groups. His surgery is scheduled for 2 pm today. 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 He was counseled to follow up with [facility] for HCV and SIADH and with plastic surgery as instructed. Internal medicine appointment was scheduled for 5/29/18 at 2 pm. Sodium normalized at 136 this morning. He remains remorseful about his suicide attempt. Patient reports okay mood, significantly improved sleep, and no problems with appetite.  Denies any manic/hypomanic symptoms, paranoid thoughts/delusions, perceptual disturbances, SI/HI.

## 2018-05-16 NOTE — PROGRESS NOTE BEHAVIORAL HEALTH - PROBLEM SELECTOR PLAN 1
-Continue Lexapro 10 mg daily and Seroquel 50 mg qHS -15 day supply of meds sent to preferred pharmacy  -Continue Lexapro 10 mg daily and Seroquel 50 mg qHS

## 2018-05-16 NOTE — PROGRESS NOTE BEHAVIORAL HEALTH - PROBLEM SELECTOR PLAN 4
-As per Dr. Jo, patient will undergo surgery this Wednesday.  -Still requires ace bandage so will maintain 1:1 -As per Dr. Jo, patient will undergo surgery today at 2pm.  -Still requires ace bandage so will maintain 1:1 until discharge

## 2018-05-16 NOTE — PROGRESS NOTE BEHAVIORAL HEALTH - NSBHATTESTSEENBY_PSY_A_CORE
attending Psychiatrist without NP/Trainee
Attending Psychiatrist supervising NP/Trainee, meeting pt...

## 2018-05-16 NOTE — PROGRESS NOTE BEHAVIORAL HEALTH - MODIFICATIONS
consider lowering Clozaril to 225mg at night in view of improved QTc and prospect of using Zofram for outpatient ECT (which widens QTc). d/c to ED for surgery on arm;  d/c CO;  continue Lexapro augmented with Seroquel for depression.  Pt to f/u medical care at [facility]; advised to f/u eye exam where he gets his glasses.

## 2018-05-16 NOTE — PROGRESS NOTE BEHAVIORAL HEALTH - NSBHADMITCOUNSEL_PSY_A_CORE
risks and benefits of treatment options/diagnostic results/impressions and/or recommended studies/prognosis/client/family/caregiver education/importance of adherence to chosen treatment/instructions for management, treatment and follow up/risk factor reduction
diagnostic results/impressions and/or recommended studies/client/family/caregiver education/prognosis/instructions for management, treatment and follow up/risk factor reduction/risks and benefits of treatment options/importance of adherence to chosen treatment

## 2018-05-16 NOTE — PROGRESS NOTE BEHAVIORAL HEALTH - NSBHCHARTREVIEWVS_PSY_A_CORE FT
wnls
wnls
Vital Signs Last 24 Hrs  T(C): 36.7 (15 May 2018 06:33), Max: 36.9 (14 May 2018 21:00)  T(F): 98.1 (15 May 2018 06:33), Max: 98.4 (14 May 2018 21:00)  HR: 82 (15 May 2018 06:33) (73 - 82)  BP: 113/73 (15 May 2018 06:33) (112/71 - 126/75)  BP(mean): --  RR: 18 (15 May 2018 06:33) (17 - 18)  SpO2: --
Vital Signs Last 24 Hrs  T(C): 36.6 (16 May 2018 06:36), Max: 36.7 (15 May 2018 20:49)  T(F): 97.8 (16 May 2018 06:36), Max: 98.1 (15 May 2018 20:49)  HR: 89 (16 May 2018 06:36) (66 - 89)  BP: 113/75 (16 May 2018 06:36) (101/66 - 114/70)  BP(mean): --  RR: 18 (16 May 2018 06:36) (18 - 18)  SpO2: --

## 2018-05-16 NOTE — PROGRESS NOTE BEHAVIORAL HEALTH - RISK ASSESSMENT
Modifiable risk factors addressed during the hospitalization: not in outpatient treatment, low mood, insomnia, si (all addressed during hospitalization)  Static risk factors: older,  male, recent serious suicide attempt, h/o alcohol use, recent hep c  Protective: Supportive wife, engaged in work, future-oriented Modifiable risk factors addressed during the hospitalization: not in outpatient treatment, low mood, insomnia, si (all addressed during hospitalization and no longer an acute risk to self or others)  Static risk factors: older,  male, recent serious suicide attempt, h/o alcohol use, recent hep c  Protective: Supportive wife, engaged in work, future-oriented

## 2018-05-16 NOTE — PROGRESS NOTE BEHAVIORAL HEALTH - NSBHCHARTREVIEWINVESTIGATE_PSY_A_CORE FT
Ventricular Rate 65 BPM    Atrial Rate 65 BPM    P-R Interval 186 ms    QRS Duration 106 ms    Q-T Interval 418 ms    QTC Calculation(Bezet) 434 ms    P Axis 22 degrees    R Axis 89 degrees    T Axis 67 degrees    Diagnosis Line Normal sinus rhythm
Ventricular Rate 65 BPM    Atrial Rate 65 BPM    P-R Interval 186 ms    QRS Duration 106 ms    Q-T Interval 418 ms    QTC Calculation(Bezet) 434 ms    P Axis 22 degrees    R Axis 89 degrees    T Axis 67 degrees    Diagnosis Line Normal sinus rhythm

## 2018-05-16 NOTE — PROGRESS NOTE BEHAVIORAL HEALTH - CASE SUMMARY
see above hx  ;;05/16: reports good sleep and appetite.  no complaints of pain.  staff notes the patient is reclusive; No behavoral issues as per staff.       MSE:  stays under the covers; fair eye contact; clean; ADLs are improved;  cognition is intact; denies AVH or s/h i/i/p.  speech normal volume, spontaneous, clear.    preoccupied and blocked.  shows lability irritabilty; mood and affect slightly anxious; affect mostly thought congruent; tc/tp no peculiarities of language use; i&j: fair to poor; accepts treatment; however not reflective on sxs or situation.  names some medications. see above hx  ;;05/16: reports good sleep and appetite; no complaints of pain.    Remorseful about cutting  himself; anticipates pain after surgery.  Reminded to attend medical f/u  ([facility]);  worries about insurance.  Aware of low sodium and hepatitis.   Patient advised to have eyes examined for cataracts where he gets his glasses (Seroquel advisory).       MSE: attends groups in the dayroom sitting with others watching TV.  good eye contact.  clean;  cognition is intact; denies AVH or s/h i/i/p; speech normal volume, spontaneous, clear; mildly anxious mood and affect; tc/tp no peculiarities of language use; i&j: fair to poor;  mildly reflective.  feels it would help to talk to a therapist.  accepting of treatment and illness.   Left forearm wrapped with bandage.

## 2018-05-16 NOTE — PROGRESS NOTE BEHAVIORAL HEALTH - PROBLEM SELECTOR PLAN 2
-As per medicine discharge summary, patient will need GI follow up as outpatient. -As per medicine discharge summary, patient will need GI follow up as outpatient. Counseled to follow up at [facility]

## 2018-05-16 NOTE — PROGRESS NOTE BEHAVIORAL HEALTH - NSBHADMITDANGERSELF_PSY_A_CORE
suicidal behavior
suicidal ideation with plan and means
suicidal ideation with plan and means
suicidal behavior

## 2018-05-16 NOTE — PROGRESS NOTE BEHAVIORAL HEALTH - NSBHCHARTREVIEWLAB_PSY_A_CORE FT
Lipid Profile in AM (05.12.18 @ 08:35)    Total Cholesterol/HDL Ratio Measurement: 2.7 RATIO    Cholesterol, Serum: 149 mg/dL    Triglycerides, Serum: 65 mg/dL    HDL Cholesterol, Serum: 55 mg/dL    Direct LDL: 81    Hemoglobin A1C, Whole Blood: 5.5    Comprehensive Metabolic Panel (05.14.18 @ 13:05)    Sodium, Serum: 134 mmol/L    Potassium, Serum: 4.6 mmol/L    Chloride, Serum: 96 mmol/L    Carbon Dioxide, Serum: 30 mmol/L    Anion Gap, Serum: 8 mmol/L    Blood Urea Nitrogen, Serum: 18 mg/dL    Creatinine, Serum: 0.72 mg/dL    Glucose, Serum: 124 mg/dL    Calcium, Total Serum: 9.1 mg/dL    Protein Total, Serum: 7.4 g/dL    Albumin, Serum: 3.9 g/dL    Bilirubin Total, Serum: 0.3 mg/dL    Alkaline Phosphatase, Serum: 50 U/L    Aspartate Aminotransferase (AST/SGOT): 25 U/L    Alanine Aminotransferase (ALT/SGPT): 35 U/L
Lipid Profile in AM (05.12.18 @ 08:35)    Total Cholesterol/HDL Ratio Measurement: 2.7 RATIO    Cholesterol, Serum: 149 mg/dL    Triglycerides, Serum: 65 mg/dL    HDL Cholesterol, Serum: 55 mg/dL    Direct LDL: 81    Hemoglobin A1C, Whole Blood: 5.5    Comprehensive Metabolic Panel (05.14.18 @ 13:05)    Sodium, Serum: 134 mmol/L    Potassium, Serum: 4.6 mmol/L    Chloride, Serum: 96 mmol/L    Carbon Dioxide, Serum: 30 mmol/L    Anion Gap, Serum: 8 mmol/L    Blood Urea Nitrogen, Serum: 18 mg/dL    Creatinine, Serum: 0.72 mg/dL    Glucose, Serum: 124 mg/dL    Calcium, Total Serum: 9.1 mg/dL    Protein Total, Serum: 7.4 g/dL    Albumin, Serum: 3.9 g/dL    Bilirubin Total, Serum: 0.3 mg/dL    Alkaline Phosphatase, Serum: 50 U/L    Aspartate Aminotransferase (AST/SGOT): 25 U/L    Alanine Aminotransferase (ALT/SGPT): 35 U/L

## 2018-05-16 NOTE — PROGRESS NOTE BEHAVIORAL HEALTH - SUMMARY
59 yo  male, employed, domiciled with wife, with PPH of alcohol use disorder in remission, no prior hospitalizations, not currently in outpatient treatment, BIB wife to [facility] ED after suicide attempt via cutting his left wrist, subsequently admitted to medicine floor where he was found to have hyponatremia due to SIADH and also diagnosed with Hepatitis C on routine screening, and now medically cleared for transfer to [facility]. Patient's depression and suicide attempt were due to several psychosocial stressors compounded by self-medicating with supplements and Ambien. Both patient and wife report that he is doing much better throughout hospitalization and the patient is willing to engage in outpatient treatment. Patient was provided with psychosocial education regarding dangers of taking Ambien, and wife was contacted to remove it from the house. Discharge planning in progress. Patient continues to require inpatient hospitalization for safety, symptom stabilization, and medication management. 57 yo  male, employed, domiciled with wife, with PPH of alcohol use disorder in remission, no prior hospitalizations, not currently in outpatient treatment, BIB wife to [facility] ED after suicide attempt via cutting his left wrist, subsequently admitted to medicine floor where he was found to have hyponatremia due to SIADH and also diagnosed with Hepatitis C on routine screening, and now medically cleared for transfer to [facility]. Patient's depression and suicide attempt were due to several psychosocial stressors compounded by self-medicating with supplements and Ambien. He is future-oriented and motivated for outpatient treatment. He denies suicidal ideation and his sleep has significantly improved. He was again counseled to no longer continue supplements or Ambien. He is stable for discharge today.

## 2018-05-17 ENCOUNTER — TRANSCRIPTION ENCOUNTER (OUTPATIENT)
Age: 58
End: 2018-05-17

## 2018-05-17 VITALS
TEMPERATURE: 96 F | HEART RATE: 81 BPM | OXYGEN SATURATION: 97 % | SYSTOLIC BLOOD PRESSURE: 104 MMHG | RESPIRATION RATE: 16 BRPM | DIASTOLIC BLOOD PRESSURE: 65 MMHG

## 2018-05-17 PROCEDURE — 88304 TISSUE EXAM BY PATHOLOGIST: CPT

## 2018-05-17 RX ORDER — IBUPROFEN 200 MG
1 TABLET ORAL
Qty: 20 | Refills: 0 | OUTPATIENT
Start: 2018-05-17 | End: 2018-05-21

## 2018-05-17 RX ORDER — QUETIAPINE FUMARATE 200 MG/1
50 TABLET, FILM COATED ORAL ONCE
Qty: 0 | Refills: 0 | Status: COMPLETED | OUTPATIENT
Start: 2018-05-17 | End: 2018-05-17

## 2018-05-17 RX ORDER — ESCITALOPRAM OXALATE 10 MG/1
10 TABLET, FILM COATED ORAL DAILY
Qty: 0 | Refills: 0 | Status: DISCONTINUED | OUTPATIENT
Start: 2018-05-17 | End: 2018-05-17

## 2018-05-17 RX ORDER — QUETIAPINE FUMARATE 200 MG/1
50 TABLET, FILM COATED ORAL AT BEDTIME
Qty: 0 | Refills: 0 | Status: DISCONTINUED | OUTPATIENT
Start: 2018-05-17 | End: 2018-05-17

## 2018-05-17 RX ADMIN — QUETIAPINE FUMARATE 50 MILLIGRAM(S): 200 TABLET, FILM COATED ORAL at 00:55

## 2018-05-17 NOTE — DISCHARGE NOTE ADULT - PLAN OF CARE
post op recovery, office follow up -Follow up with Plastic Surgeon, Dr. Jo on Tuesday in the office.     -Keep splint on, clean, and dry at all times. Keep left upper extremity elevated.     -Diet: no restrictions.     -Call Doctor’s office or return to ER if: fever (temperature >101.4F), chills, chest pain, shortness of breath, uncontrolled/severe pain, persistent nausea/vomiting, or bleeding/oozing/redness/swelling at incision sites.

## 2018-05-17 NOTE — DISCHARGE NOTE ADULT - MEDICATION SUMMARY - MEDICATIONS TO TAKE
I will START or STAY ON the medications listed below when I get home from the hospital:    ibuprofen 600 mg oral tablet  -- 1 tab(s) by mouth every 6 hours   -- Do not take this drug if you are pregnant.  It is very important that you take or use this exactly as directed.  Do not skip doses or discontinue unless directed by your doctor.  May cause drowsiness or dizziness.  Obtain medical advice before taking any non-prescription drugs as some may affect the action of this medication.  Take with food or milk.    -- Indication: For pain    escitalopram 10 mg oral tablet  -- 1 tab(s) by mouth once a day  -- Indication: For home med    QUEtiapine 50 mg oral tablet  -- 1 tab(s) by mouth once a day (at bedtime)  -- Indication: For home med    clotrimazole 1% topical cream  -- 1 application on skin 2 times a day  -- Indication: For home med

## 2018-05-17 NOTE — DISCHARGE NOTE ADULT - HOSPITAL COURSE
57 y/o male transferred from psychiatry for self inflicted left forearm laceration with tendon involvement. He went for left forearm tendon repair yesterday. Patient tolerated the procedure well and had uneventful postoperative hospital course.  On the day of the discharge, patient was evaluated and deemed in stable condition to be discharged to home. Follow up on Tuesday with Dr. Jo in office.

## 2018-05-17 NOTE — DISCHARGE NOTE ADULT - CARE PROVIDER_API CALL
Jorge Alberto Jo), Plastic Surgery; Surgery  [address] [place] [address]  Phone: [phone]  Fax: [phone]

## 2018-05-17 NOTE — DISCHARGE NOTE ADULT - CARE PLAN
Principal Discharge DX:	Laceration of left forearm with tendon involvement  Goal:	post op recovery, office follow up  Assessment and plan of treatment:	-Follow up with Plastic Surgeon, Dr. Jo on Tuesday in the office.     -Keep splint on, clean, and dry at all times. Keep left upper extremity elevated.     -Diet: no restrictions.     -Call Doctor’s office or return to ER if: fever (temperature >101.4F), chills, chest pain, shortness of breath, uncontrolled/severe pain, persistent nausea/vomiting, or bleeding/oozing/redness/swelling at incision sites.

## 2018-05-17 NOTE — DISCHARGE NOTE ADULT - PATIENT PORTAL LINK FT
You can access the [facility] Patient Portal, offered by [facility], by registering with the following website: [email or web address]

## 2018-05-18 DIAGNOSIS — E22.2 SYNDROME OF INAPPROPRIATE SECRETION OF ANTIDIURETIC HORMONE: ICD-10-CM

## 2018-05-18 DIAGNOSIS — Z59.9 PROBLEM RELATED TO HOUSING AND ECONOMIC CIRCUMSTANCES, UNSPECIFIED: ICD-10-CM

## 2018-05-18 DIAGNOSIS — T50.995A ADVERSE EFFECT OF OTHER DRUGS, MEDICAMENTS AND BIOLOGICAL SUBSTANCES, INITIAL ENCOUNTER: ICD-10-CM

## 2018-05-18 DIAGNOSIS — F10.21 ALCOHOL DEPENDENCE, IN REMISSION: ICD-10-CM
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
## 2018-05-18 DIAGNOSIS — B19.20 UNSPECIFIED VIRAL HEPATITIS C WITHOUT HEPATIC COMA: ICD-10-CM

## 2018-05-18 DIAGNOSIS — R45.851 SUICIDAL IDEATIONS: ICD-10-CM

## 2018-05-18 DIAGNOSIS — S64.12XD INJURY OF MEDIAN NERVE AT WRIST AND HAND LEVEL OF LEFT ARM, SUBSEQUENT ENCOUNTER: ICD-10-CM

## 2018-05-18 DIAGNOSIS — T42.6X5A ADVERSE EFFECT OF OTHER ANTIEPILEPTIC AND SEDATIVE-HYPNOTIC DRUGS, INITIAL ENCOUNTER: ICD-10-CM

## 2018-05-18 DIAGNOSIS — F32.9 MAJOR DEPRESSIVE DISORDER, SINGLE EPISODE, UNSPECIFIED: ICD-10-CM

## 2018-05-18 PROCEDURE — 83036 HEMOGLOBIN GLYCOSYLATED A1C: CPT

## 2018-05-18 PROCEDURE — 84550 ASSAY OF BLOOD/URIC ACID: CPT

## 2018-05-18 PROCEDURE — 80061 LIPID PANEL: CPT

## 2018-05-18 PROCEDURE — 85730 THROMBOPLASTIN TIME PARTIAL: CPT

## 2018-05-18 PROCEDURE — 80051 ELECTROLYTE PANEL: CPT

## 2018-05-18 PROCEDURE — 80048 BASIC METABOLIC PNL TOTAL CA: CPT

## 2018-05-18 PROCEDURE — 36415 COLL VENOUS BLD VENIPUNCTURE: CPT

## 2018-05-18 PROCEDURE — C9352: CPT

## 2018-05-18 PROCEDURE — 83930 ASSAY OF BLOOD OSMOLALITY: CPT

## 2018-05-18 PROCEDURE — 83735 ASSAY OF MAGNESIUM: CPT

## 2018-05-18 PROCEDURE — 88304 TISSUE EXAM BY PATHOLOGIST: CPT

## 2018-05-18 PROCEDURE — 85610 PROTHROMBIN TIME: CPT

## 2018-05-18 PROCEDURE — 85027 COMPLETE CBC AUTOMATED: CPT

## 2018-05-18 PROCEDURE — 80053 COMPREHEN METABOLIC PANEL: CPT

## 2018-05-18 SDOH — ECONOMIC STABILITY - INCOME SECURITY: PROBLEM RELATED TO HOUSING AND ECONOMIC CIRCUMSTANCES, UNSPECIFIED: Z59.9

## 2018-05-23 DIAGNOSIS — S66.822A LACERATION OF OTHER SPECIFIED MUSCLES, FASCIA AND TENDONS AT WRIST AND HAND LEVEL, LEFT HAND, INITIAL ENCOUNTER: ICD-10-CM

## 2018-05-23 DIAGNOSIS — S66.129A LACERATION OF FLEXOR MUSCLE, FASCIA AND TENDON OF UNSPECIFIED FINGER AT WRIST AND HAND LEVEL, INITIAL ENCOUNTER: ICD-10-CM

## 2018-05-23 DIAGNOSIS — R45.851 SUICIDAL IDEATIONS: ICD-10-CM

## 2018-05-23 DIAGNOSIS — F32.2 MAJOR DEPRESSIVE DISORDER, SINGLE EPISODE, SEVERE WITHOUT PSYCHOTIC FEATURES: ICD-10-CM

## 2018-05-23 DIAGNOSIS — E22.2 SYNDROME OF INAPPROPRIATE SECRETION OF ANTIDIURETIC HORMONE: ICD-10-CM

## 2018-05-23 DIAGNOSIS — S64.12XA INJURY OF MEDIAN NERVE AT WRIST AND HAND LEVEL OF LEFT ARM, INITIAL ENCOUNTER: ICD-10-CM
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
## 2018-05-23 DIAGNOSIS — F10.21 ALCOHOL DEPENDENCE, IN REMISSION: ICD-10-CM
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
## 2018-05-23 DIAGNOSIS — S61.512A LACERATION WITHOUT FOREIGN BODY OF LEFT WRIST, INITIAL ENCOUNTER: ICD-10-CM

## 2018-05-23 DIAGNOSIS — E86.0 DEHYDRATION: ICD-10-CM

## 2018-05-23 DIAGNOSIS — B19.20 UNSPECIFIED VIRAL HEPATITIS C WITHOUT HEPATIC COMA: ICD-10-CM

## 2018-05-23 DIAGNOSIS — L30.4 ERYTHEMA INTERTRIGO: ICD-10-CM

## 2018-05-23 DIAGNOSIS — F41.9 ANXIETY DISORDER, UNSPECIFIED: ICD-10-CM

## 2018-05-29 ENCOUNTER — APPOINTMENT (OUTPATIENT)
Dept: INTERNAL MEDICINE | Facility: CLINIC | Age: 58
End: 2018-05-29
Payer: COMMERCIAL

## 2018-05-29 VITALS
SYSTOLIC BLOOD PRESSURE: 108 MMHG | HEIGHT: 61 IN | BODY MASS INDEX: 35.87 KG/M2 | DIASTOLIC BLOOD PRESSURE: 69 MMHG | OXYGEN SATURATION: 97 % | WEIGHT: 190 LBS | TEMPERATURE: 97.1 F | HEART RATE: 79 BPM

## 2018-05-29 DIAGNOSIS — Z00.00 ENCOUNTER FOR GENERAL ADULT MEDICAL EXAMINATION W/OUT ABNORMAL FINDINGS: ICD-10-CM

## 2018-05-29 DIAGNOSIS — S51.812A LACERATION W/OUT FOREIGN BODY OF LEFT FOREARM, INITIAL ENCOUNTER: ICD-10-CM

## 2018-05-29 PROCEDURE — 99213 OFFICE O/P EST LOW 20 MIN: CPT | Mod: GC

## 2018-05-29 PROCEDURE — 99386 PREV VISIT NEW AGE 40-64: CPT

## 2018-05-29 NOTE — HISTORY OF PRESENT ILLNESS
[FreeTextEntry1] : pt found out in hospital that he has hep c pt says he was told to come  here for treatment
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
## 2018-05-30 DIAGNOSIS — S56.222A LACERATION OF OTHER FLEXOR MUSCLE, FASCIA AND TENDON AT FOREARM LEVEL, LEFT ARM, INITIAL ENCOUNTER: ICD-10-CM

## 2018-05-30 DIAGNOSIS — S64.12XA INJURY OF MEDIAN NERVE AT WRIST AND HAND LEVEL OF LEFT ARM, INITIAL ENCOUNTER: ICD-10-CM

## 2018-05-30 DIAGNOSIS — B19.20 UNSPECIFIED VIRAL HEPATITIS C WITHOUT HEPATIC COMA: ICD-10-CM

## 2018-05-30 DIAGNOSIS — S64.8X2A INJURY OF OTHER NERVES AT WRIST AND HAND LEVEL OF LEFT ARM, INITIAL ENCOUNTER: ICD-10-CM

## 2018-05-30 DIAGNOSIS — S66.822A LACERATION OF OTHER SPECIFIED MUSCLES, FASCIA AND TENDONS AT WRIST AND HAND LEVEL, LEFT HAND, INITIAL ENCOUNTER: ICD-10-CM
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
## 2018-05-30 DIAGNOSIS — S61.512A LACERATION WITHOUT FOREIGN BODY OF LEFT WRIST, INITIAL ENCOUNTER: ICD-10-CM

## 2018-05-30 DIAGNOSIS — S61.522A LACERATION WITH FOREIGN BODY OF LEFT WRIST, INITIAL ENCOUNTER: ICD-10-CM

## 2018-05-30 DIAGNOSIS — S66.129A LACERATION OF FLEXOR MUSCLE, FASCIA AND TENDON OF UNSPECIFIED FINGER AT WRIST AND HAND LEVEL, INITIAL ENCOUNTER: ICD-10-CM

## 2018-05-30 DIAGNOSIS — S54.12XA INJURY OF MEDIAN NERVE AT FOREARM LEVEL, LEFT ARM, INITIAL ENCOUNTER: ICD-10-CM

## 2018-08-19 ENCOUNTER — RESULT REVIEW (OUTPATIENT)
Age: 58
End: 2018-08-19

## 2018-08-19 ENCOUNTER — INPATIENT (INPATIENT)
Facility: HOSPITAL | Age: 58
LOS: 7 days | Discharge: HOME | DRG: 987 | End: 2018-08-27
Attending: STUDENT IN AN ORGANIZED HEALTH CARE EDUCATION/TRAINING PROGRAM | Admitting: STUDENT IN AN ORGANIZED HEALTH CARE EDUCATION/TRAINING PROGRAM
Payer: COMMERCIAL

## 2018-08-19 VITALS
TEMPERATURE: 98 F | RESPIRATION RATE: 18 BRPM | HEART RATE: 95 BPM | DIASTOLIC BLOOD PRESSURE: 72 MMHG | SYSTOLIC BLOOD PRESSURE: 141 MMHG | OXYGEN SATURATION: 95 %
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
## 2018-08-19 DIAGNOSIS — R63.8 OTHER SYMPTOMS AND SIGNS CONCERNING FOOD AND FLUID INTAKE: ICD-10-CM

## 2018-08-19 DIAGNOSIS — N13.30 UNSPECIFIED HYDRONEPHROSIS: ICD-10-CM

## 2018-08-19 DIAGNOSIS — S69.92XS UNSPECIFIED INJURY OF LEFT WRIST, HAND AND FINGER(S), SEQUELA: Chronic | ICD-10-CM

## 2018-08-19 DIAGNOSIS — Z91.89 OTHER SPECIFIED PERSONAL RISK FACTORS, NOT ELSEWHERE CLASSIFIED: ICD-10-CM

## 2018-08-19 DIAGNOSIS — I82.409 ACUTE EMBOLISM AND THROMBOSIS OF UNSPECIFIED DEEP VEINS OF UNSPECIFIED LOWER EXTREMITY: ICD-10-CM

## 2018-08-19 DIAGNOSIS — D64.9 ANEMIA, UNSPECIFIED: ICD-10-CM

## 2018-08-19 DIAGNOSIS — R59.1 GENERALIZED ENLARGED LYMPH NODES: ICD-10-CM

## 2018-08-19 DIAGNOSIS — I80.229 PHLEBITIS AND THROMBOPHLEBITIS OF UNSPECIFIED POPLITEAL VEIN: ICD-10-CM

## 2018-08-19 DIAGNOSIS — R60.0 LOCALIZED EDEMA: ICD-10-CM

## 2018-08-19 DIAGNOSIS — N17.9 ACUTE KIDNEY FAILURE, UNSPECIFIED: ICD-10-CM

## 2018-08-19 DIAGNOSIS — E88.3 TUMOR LYSIS SYNDROME: ICD-10-CM

## 2018-08-19 DIAGNOSIS — B19.20 UNSPECIFIED VIRAL HEPATITIS C WITHOUT HEPATIC COMA: ICD-10-CM

## 2018-08-19 LAB
ALBUMIN SERPL ELPH-MCNC: 3.9 G/DL — SIGNIFICANT CHANGE UP (ref 3.3–5)
ALP SERPL-CCNC: 56 U/L — SIGNIFICANT CHANGE UP (ref 40–120)
ALT FLD-CCNC: 20 U/L — SIGNIFICANT CHANGE UP (ref 10–45)
ANION GAP SERPL CALC-SCNC: 16 MMOL/L — SIGNIFICANT CHANGE UP (ref 5–17)
APPEARANCE UR: CLEAR — SIGNIFICANT CHANGE UP
APTT BLD: 29.8 SEC — SIGNIFICANT CHANGE UP (ref 27.5–37.4)
AST SERPL-CCNC: 30 U/L — SIGNIFICANT CHANGE UP (ref 10–40)
BACTERIA # UR AUTO: PRESENT /HPF
BASOPHILS NFR BLD AUTO: 0.6 % — SIGNIFICANT CHANGE UP (ref 0–2)
BILIRUB SERPL-MCNC: 0.4 MG/DL — SIGNIFICANT CHANGE UP (ref 0.2–1.2)
BILIRUB UR-MCNC: NEGATIVE — SIGNIFICANT CHANGE UP
BUN SERPL-MCNC: 54 MG/DL — HIGH (ref 7–23)
CALCIUM SERPL-MCNC: 9 MG/DL — SIGNIFICANT CHANGE UP (ref 8.4–10.5)
CHLORIDE SERPL-SCNC: 95 MMOL/L — LOW (ref 96–108)
CO2 SERPL-SCNC: 23 MMOL/L — SIGNIFICANT CHANGE UP (ref 22–31)
COLOR SPEC: YELLOW — SIGNIFICANT CHANGE UP
CREAT ?TM UR-MCNC: 52 MG/DL — SIGNIFICANT CHANGE UP
CREAT SERPL-MCNC: 3.11 MG/DL — HIGH (ref 0.5–1.3)
DIFF PNL FLD: ABNORMAL
EOSINOPHIL NFR BLD AUTO: 7.5 % — HIGH (ref 0–6)
EPI CELLS # UR: SIGNIFICANT CHANGE UP /HPF (ref 0–5)
GLUCOSE SERPL-MCNC: 102 MG/DL — HIGH (ref 70–99)
GLUCOSE UR QL: NEGATIVE — SIGNIFICANT CHANGE UP
HCT VFR BLD CALC: 31.7 % — LOW (ref 39–50)
HGB BLD-MCNC: 10.8 G/DL — LOW (ref 13–17)
INR BLD: 1.02 — SIGNIFICANT CHANGE UP (ref 0.88–1.16)
KETONES UR-MCNC: NEGATIVE — SIGNIFICANT CHANGE UP
LEUKOCYTE ESTERASE UR-ACNC: NEGATIVE — SIGNIFICANT CHANGE UP
LYMPHOCYTES # BLD AUTO: 13.3 % — SIGNIFICANT CHANGE UP (ref 13–44)
MCHC RBC-ENTMCNC: 32.5 PG — SIGNIFICANT CHANGE UP (ref 27–34)
MCHC RBC-ENTMCNC: 34.1 G/DL — SIGNIFICANT CHANGE UP (ref 32–36)
MCV RBC AUTO: 95.5 FL — SIGNIFICANT CHANGE UP (ref 80–100)
MONOCYTES NFR BLD AUTO: 14.2 % — HIGH (ref 2–14)
NEUTROPHILS NFR BLD AUTO: 64.4 % — SIGNIFICANT CHANGE UP (ref 43–77)
NITRITE UR-MCNC: NEGATIVE — SIGNIFICANT CHANGE UP
NT-PROBNP SERPL-SCNC: 360 PG/ML — HIGH (ref 0–300)
PH UR: 6 — SIGNIFICANT CHANGE UP (ref 5–8)
PHOSPHATE SERPL-MCNC: 5 MG/DL — HIGH (ref 2.5–4.5)
PLATELET # BLD AUTO: 210 K/UL — SIGNIFICANT CHANGE UP (ref 150–400)
POTASSIUM SERPL-MCNC: 4.8 MMOL/L — SIGNIFICANT CHANGE UP (ref 3.5–5.3)
POTASSIUM SERPL-SCNC: 4.8 MMOL/L — SIGNIFICANT CHANGE UP (ref 3.5–5.3)
PROT SERPL-MCNC: 7.5 G/DL — SIGNIFICANT CHANGE UP (ref 6–8.3)
PROT UR-MCNC: ABNORMAL MG/DL
PROTHROM AB SERPL-ACNC: 11.3 SEC — SIGNIFICANT CHANGE UP (ref 9.8–12.7)
RBC # BLD: 3.32 M/UL — LOW (ref 4.2–5.8)
RBC # FLD: 11.5 % — SIGNIFICANT CHANGE UP (ref 10.3–16.9)
RBC CASTS # UR COMP ASSIST: ABNORMAL /HPF
SODIUM SERPL-SCNC: 134 MMOL/L — LOW (ref 135–145)
SODIUM UR-SCNC: 43 MMOL/L — SIGNIFICANT CHANGE UP
SP GR SPEC: <=1.005 — SIGNIFICANT CHANGE UP (ref 1–1.03)
UROBILINOGEN FLD QL: 0.2 E.U./DL — SIGNIFICANT CHANGE UP
WBC # BLD: 6.4 K/UL — SIGNIFICANT CHANGE UP (ref 3.8–10.5)
WBC # FLD AUTO: 6.4 K/UL — SIGNIFICANT CHANGE UP (ref 3.8–10.5)
WBC UR QL: < 5 /HPF — SIGNIFICANT CHANGE UP

## 2018-08-19 PROCEDURE — 74176 CT ABD & PELVIS W/O CONTRAST: CPT | Mod: 26

## 2018-08-19 PROCEDURE — 76770 US EXAM ABDO BACK WALL COMP: CPT | Mod: 26

## 2018-08-19 PROCEDURE — 71046 X-RAY EXAM CHEST 2 VIEWS: CPT | Mod: 26

## 2018-08-19 PROCEDURE — 93970 EXTREMITY STUDY: CPT | Mod: 26

## 2018-08-19 PROCEDURE — 93010 ELECTROCARDIOGRAM REPORT: CPT

## 2018-08-19 PROCEDURE — 99223 1ST HOSP IP/OBS HIGH 75: CPT | Mod: GC

## 2018-08-19 PROCEDURE — 71250 CT THORAX DX C-: CPT | Mod: 26

## 2018-08-19 PROCEDURE — 99285 EMERGENCY DEPT VISIT HI MDM: CPT | Mod: 25

## 2018-08-19 RX ORDER — LANOLIN ALCOHOL/MO/W.PET/CERES
5 CREAM (GRAM) TOPICAL AT BEDTIME
Qty: 0 | Refills: 0 | Status: DISCONTINUED | OUTPATIENT
Start: 2018-08-19 | End: 2018-08-27

## 2018-08-19 RX ORDER — HEPARIN SODIUM 5000 [USP'U]/ML
1500 INJECTION INTRAVENOUS; SUBCUTANEOUS
Qty: 25000 | Refills: 0 | Status: DISCONTINUED | OUTPATIENT
Start: 2018-08-19 | End: 2018-08-20

## 2018-08-19 RX ORDER — SODIUM CHLORIDE 9 MG/ML
1000 INJECTION INTRAMUSCULAR; INTRAVENOUS; SUBCUTANEOUS
Qty: 0 | Refills: 0 | Status: DISCONTINUED | OUTPATIENT
Start: 2018-08-19 | End: 2018-08-26

## 2018-08-19 RX ORDER — RASBURICASE 7.5 MG
6 KIT INTRAVENOUS ONCE
Qty: 0 | Refills: 0 | Status: DISCONTINUED | OUTPATIENT
Start: 2018-08-19 | End: 2018-08-19

## 2018-08-19 RX ORDER — SODIUM CHLORIDE 9 MG/ML
1000 INJECTION INTRAMUSCULAR; INTRAVENOUS; SUBCUTANEOUS
Qty: 0 | Refills: 0 | Status: DISCONTINUED | OUTPATIENT
Start: 2018-08-19 | End: 2018-08-19

## 2018-08-19 RX ORDER — RASBURICASE 7.5 MG
6 KIT INTRAVENOUS ONCE
Qty: 0 | Refills: 0 | Status: COMPLETED | OUTPATIENT
Start: 2018-08-19 | End: 2018-08-19

## 2018-08-19 RX ORDER — ALLOPURINOL 300 MG
300 TABLET ORAL DAILY
Qty: 0 | Refills: 0 | Status: DISCONTINUED | OUTPATIENT
Start: 2018-08-19 | End: 2018-08-27

## 2018-08-19 RX ORDER — SODIUM CHLORIDE 9 MG/ML
1000 INJECTION INTRAMUSCULAR; INTRAVENOUS; SUBCUTANEOUS ONCE
Qty: 0 | Refills: 0 | Status: COMPLETED | OUTPATIENT
Start: 2018-08-19 | End: 2018-08-19

## 2018-08-19 RX ADMIN — HEPARIN SODIUM 15 UNIT(S)/HR: 5000 INJECTION INTRAVENOUS; SUBCUTANEOUS at 19:48

## 2018-08-19 RX ADMIN — SODIUM CHLORIDE 125 MILLILITER(S): 9 INJECTION INTRAMUSCULAR; INTRAVENOUS; SUBCUTANEOUS at 23:10

## 2018-08-19 RX ADMIN — RASBURICASE 108 MILLIGRAM(S): KIT at 18:45

## 2018-08-19 RX ADMIN — SODIUM CHLORIDE 2000 MILLILITER(S): 9 INJECTION INTRAMUSCULAR; INTRAVENOUS; SUBCUTANEOUS at 12:21

## 2018-08-19 RX ADMIN — Medication 5 MILLIGRAM(S): at 23:09

## 2018-08-19 RX ADMIN — Medication 300 MILLIGRAM(S): at 18:45

## 2018-08-19 RX ADMIN — SODIUM CHLORIDE 125 MILLILITER(S): 9 INJECTION INTRAMUSCULAR; INTRAVENOUS; SUBCUTANEOUS at 17:44

## 2018-08-19 NOTE — ED ADULT NURSE NOTE - NSIMPLEMENTINTERV_GEN_ALL_ED
Implemented All Universal Safety Interventions:  [place] to call system. Call bell, personal items and telephone within reach. Instruct patient to call for assistance. Room bathroom lighting operational. Non-slip footwear when patient is off stretcher. Physically safe environment: no spills, clutter or unnecessary equipment. Stretcher in lowest position, wheels locked, appropriate side rails in place.
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
## 2018-08-19 NOTE — ED PROVIDER NOTE - OBJECTIVE STATEMENT
57 yo male h/o depression, hep c, former etoh abuse,  fh lymphoma c/o 3 wk bilat le edema w/o associated cp, palpitations, sob, h/o chf, pvd, dvt, recent immobilization, orthopnea, dickson as well as 3 wk L neck swelling w/o other lad.  No night sweats, wt loss.  Pt notes recent hematuria that resolved w/o dysuria, hesitancy, retention sx, flank pain, h/o stones.  No current hematuria. Pt went to urgent care about le edema and hematuria but states they did not do much to evaluate his sx.  No abd distension.  No fever.  No abd pain, n/v/d.

## 2018-08-19 NOTE — CONSULT NOTE ADULT - PROBLEM SELECTOR RECOMMENDATION 9
Recommend Sher catheter for bladder decompression. If placed watch for possible post obstructive diuresis. Will need to monitor output, if UO greater than 200ml/hr will need to replenish 1/2 cc IV NS with every CC urine output.   May need Bilateral nephrostomy tubes  Trend Creatinine  Find out patient baseline creat  continue Lymphoma workup  Care per primary team  Pt. states he rather not have any procedures concerning his hydronephrosis but to treat the underlying cause instead. It was explained to him waiting to treat the lymphoma could lead to permanent kidney damage. Recommend Sher catheter for bladder decompression. If placed watch for possible post obstructive diuresis.  May need Bilateral nephrostomy tubes  Trend Creatinine  Find out patient baseline creat  continue Lymphoma workup  Care per primary team  Pt. states he rather not have any procedures concerning his hydronephrosis but to treat the underlying cause instead. It was explained to him waiting to treat the lymphoma could lead to permanent kidney damage. Recommend Sher catheter for bladder decompression. If placed watch for possible post obstructive diuresis. If UO > 200ml/hr. Will have to replace 1/2 ml of NS for 1 ml of urine output and monitor lytes q 6 hrs.  May need Bilateral nephrostomy tubes  Trend Creatinine  Find out patient baseline creat  continue Lymphoma workup  Care per primary team  Pt. states he rather not have any procedures concerning his hydronephrosis but to treat the underlying cause instead. It was explained to him waiting to treat the lymphoma could lead to permanent kidney damage.

## 2018-08-19 NOTE — ED ADULT NURSE NOTE - OBJECTIVE STATEMENT
Patient is a 59yo male with multiple medical complaints. Patient reports he has had increasing leg swelling over the past few weeks after diagnosis of Hepatitis C. Patient also reports he had multiple days of blood in the urine that has since resolved. Patient states he has had intermittent chest pains with exertion. Denies shortness of breath, abdominal pain, n/v/d, fevers.

## 2018-08-19 NOTE — ED ADULT NURSE NOTE - CHIEF COMPLAINT QUOTE
At [facility], one important tool we use to improve our patient services is our Patient Survey.  Following your visit you may receive our survey in the mail.    Please take the time to complete the survey.    If your visit with us was great, we want to hear about it.    If we can improve, please let us know how.       Keratosis pilaris can occur at any age, although it's particularly common in young children. Signs and symptoms include:   Small white or red bumps, typically on the upper arms, legs, buttocks or cheeks   Dry, rough and sometimes itchy skin in the areas with bumps   Worsening in winter, when humidity is low and skin tends to be drier   Keratosis pilaris may be limited to individual, sandpaper-like bumps resembling goose flesh. In some cases, the bumps may become inflamed and cause scarring, especially on the face.   Gradually, keratosis pilaris usually resolves on its own    Keratosis pilaris results from the buildup of keratin -- a hard protein that protects your skin from harmful substances and infection. The keratin forms a scaly plug that blocks the opening of the hair follicle. Usually many plugs form, causing patches of rough, bumpy skin.   Why keratin builds up is unknown. But it may occur in association with genetic diseases or with other skin conditions, such atopic dermatitis. Keratosis pilaris also occurs in otherwise healthy people. Dry skin tends to worsen this condition       No single treatment universally improves keratosis pilaris. Most options, including self-care measures and medicated creams, focus on softening the keratin deposits in the skin.   Treatment of keratosis pilaris can include the following medications:   Topical exfoliants. Medicated creams containing alpha-hydroxy acid, lactic acid, salicylic acid or urea  (such as Lac-Hydrin) moisturize and soften dry skin while helping to loosen and remove dead skin cells. Depending on their strength, certain creams are  swollen legs and lump on left side of neck available over-the-counter and others require a prescription. Your doctor can advise you on the best option for your skin. The acids in these creams may cause redness, stinging or skin irritation. For that reason, topical exfoliants aren't recommended for young children.

## 2018-08-19 NOTE — H&P ADULT - NSHPPHYSICALEXAM_GEN_ALL_CORE
.  VITAL SIGNS:  T(F): 97.8 (08-19-18 @ 15:40), Max: 98.2 (08-19-18 @ 07:51)  HR: 67 (08-19-18 @ 15:40) (66 - 95)  BP: 131/78 (08-19-18 @ 15:40) (129/84 - 141/72)  RR: 18 (08-19-18 @ 15:40) (16 - 18)  SpO2: 100% (08-19-18 @ 15:40) (95% - 100%)    PHYSICAL EXAM:    Constitutional: WDWN resting comfortably in bed; NAD  HEENT: NC/AT, PERRL, EOMI, anicteric sclera, no nasal discharge; uvula midline, no oropharyngeal erythema or exudates; MMM  Neck: supple; no JVD or thyromegaly. Palpable L-side matted lymph nodes  Respiratory: L lung basilar crackles, R lung base decreased breath sounds; no W/R/R, no retractions  Cardiac: +S1/S2; RRR; no M/R/G;  Gastrointestinal: soft, distended, NT; no rebound or guarding;  Extremities: WWP, no clubbing or cyanosis; +4 pitting edema b/l LE  Musculoskeletal: NROM UE; no joint swelling, tenderness or erythema; LE decreased ROM, limited by weight and edema  Lymphatic: as above, see Neck  Neurologic: AAOx3; CNII-XII grossly intact; no focal deficits .  VITAL SIGNS:  T(F): 97.8 (08-19-18 @ 15:40), Max: 98.2 (08-19-18 @ 07:51)  HR: 67 (08-19-18 @ 15:40) (66 - 95)  BP: 131/78 (08-19-18 @ 15:40) (129/84 - 141/72)  RR: 18 (08-19-18 @ 15:40) (16 - 18)  SpO2: 100% (08-19-18 @ 15:40) (95% - 100%)    PHYSICAL EXAM:    Constitutional: WDWN resting comfortably in bed; NAD  HEENT: NC/AT, PERRL, EOMI, anicteric sclera, no nasal discharge; uvula midline, no oropharyngeal erythema or exudates; MMM  Neck: supple; no JVD or thyromegaly. Palpable L-side matted lymph nodes  Respiratory: L lung basilar crackles, R lung base decreased breath sounds; no W/R/R, no retractions  Cardiac: +S1/S2; RRR; no M/R/G;  Gastrointestinal: soft, distended, NT; no rebound or guarding; no suprapubic tenderness.  Extremities: WWP, no clubbing or cyanosis; +4 pitting edema b/l LE  Musculoskeletal: NROM UE; no joint swelling, tenderness or erythema; LE decreased ROM, limited by weight and edema  Lymphatic: as above, see Neck  Neurologic: AAOx3; CNII-XII grossly intact; no focal deficits .  VITAL SIGNS:  T(F): 97.8 (08-19-18 @ 15:40), Max: 98.2 (08-19-18 @ 07:51)  HR: 67 (08-19-18 @ 15:40) (66 - 95)  BP: 131/78 (08-19-18 @ 15:40) (129/84 - 141/72)  RR: 18 (08-19-18 @ 15:40) (16 - 18)  SpO2: 100% (08-19-18 @ 15:40) (95% - 100%)    PHYSICAL EXAM:    Constitutional: WDWN resting comfortably in bed; NAD  HEENT: NC/AT, PERRL, EOMI, anicteric sclera, no nasal discharge; uvula midline, no oropharyngeal erythema or exudates; MMM  Neck: supple; no JVD or thyromegaly. Palpable L-side matted lymph nodes  Respiratory: L lung basilar crackles, R lung base decreased breath sounds; no W/R/R, no retractions  Cardiac: +S1/S2; RRR; no M/R/G;  Gastrointestinal: soft, distended, NT; no rebound or guarding; no suprapubic tenderness.  Inguinal: B/l inguinal nontender lymphadenopathy  Axillary: Lymphadenopathy.   Extremities: WWP, no clubbing or cyanosis; +4 pitting edema b/l LE upto mid thigh.   Musculoskeletal: NROM UE; no joint swelling, tenderness or erythema; LE decreased ROM, limited by weight and edema,   Neurologic: AAOx3; CNII-XII grossly intact; no focal deficits .  VITAL SIGNS:  T(F): 97.8 (08-19-18 @ 15:40), Max: 98.2 (08-19-18 @ 07:51)  HR: 67 (08-19-18 @ 15:40) (66 - 95)  BP: 131/78 (08-19-18 @ 15:40) (129/84 - 141/72)  RR: 18 (08-19-18 @ 15:40) (16 - 18)  SpO2: 100% (08-19-18 @ 15:40) (95% - 100%)    PHYSICAL EXAM:    Constitutional: WDWN resting comfortably in bed; NAD  HEENT: NC/AT, PERRL, EOMI, anicteric sclera, no nasal discharge; uvula midline, no oropharyngeal erythema or exudates; MMM  Neck: supple; no JVD or thyromegaly. Palpable L-side matted lymph nodes  Respiratory: L lung basilar crackles, R lung base decreased breath sounds; no W/R/R, no retractions  Cardiac: +S1/S2; RRR; no M/R/G;  Gastrointestinal: soft, distended, NT; no rebound or guarding; no suprapubic tenderness.  Inguinal: B/l inguinal nontender lymphadenopathy  Axillary: Lymphadenopathy.   Extremities: WWP, no clubbing or cyanosis; +4 pitting edema b/l LE upto mid thigh.   Musculoskeletal: NROM UE; no joint swelling, tenderness or erythema; LE decreased ROM, limited by weight and edema,   Neurologic: AAOx3; CNII-XII grossly intact; no focal deficits  Psych: anxious affect

## 2018-08-19 NOTE — H&P ADULT - PROBLEM SELECTOR PLAN 1
Pt presenting with c/o Lower extremity swelling. In setting of lymphadenopathy (and likely Lymphoma) likely cause of DVT.  -Typically would like to have the pt on Lovenox. But pt needs lymph node biopsy in the am. So will start pt on Heparin drip for now.   -Monitor aPTT q6h Pt presenting with c/o Lower extremity swelling. In setting of lymphadenopathy (and likely Lymphoma) likely cause of DVT.  -Typically would like to have the pt on Lovenox. But pt needs lymph node biopsy in the am also has KIRSTEN. So will start pt on Heparin drip for now.   -Monitor aPTT q6h

## 2018-08-19 NOTE — H&P ADULT - NSHPLABSRESULTS_GEN_ALL_CORE
.  LABS:                         10.8   6.4   )-----------( 210      ( 19 Aug 2018 08:22 )             31.7     08-19    134<L>  |  95<L>  |  54<H>  ----------------------------<  102<H>  4.8   |  23  |  3.11<H>    Ca    9.0      19 Aug 2018 08:22    TPro  7.5  /  Alb  3.9  /  TBili  0.4  /  DBili  x   /  AST  30  /  ALT  20  /  AlkPhos  56  08-19    PT/INR - ( 19 Aug 2018 08:49 )   PT: 11.3 sec;   INR: 1.02          PTT - ( 19 Aug 2018 08:49 )  PTT:29.8 sec  Urinalysis Basic - ( 19 Aug 2018 09:52 )    Color: Yellow / Appearance: Clear / SG: <=1.005 / pH: x  Gluc: x / Ketone: NEGATIVE  / Bili: Negative / Urobili: 0.2 E.U./dL   Blood: x / Protein: Trace mg/dL / Nitrite: NEGATIVE   Leuk Esterase: NEGATIVE / RBC: 5-10 /HPF / WBC < 5 /HPF   Sq Epi: x / Non Sq Epi: 0-5 /HPF / Bacteria: Present /HPF          Serum Pro-Brain Natriuretic Peptide: 360 pg/mL (08-19 @ 08:22)        RADIOLOGY, EKG & ADDITIONAL TESTS: Reviewed. .  LABS:                         10.8   6.4   )-----------( 210      ( 19 Aug 2018 08:22 )             31.7     08-19    134<L>  |  95<L>  |  54<H>  ----------------------------<  102<H>  4.8   |  23  |  3.11<H>    Ca    9.0      19 Aug 2018 08:22    TPro  7.5  /  Alb  3.9  /  TBili  0.4  /  DBili  x   /  AST  30  /  ALT  20  /  AlkPhos  56  08-19    PT/INR - ( 19 Aug 2018 08:49 )   PT: 11.3 sec;   INR: 1.02          PTT - ( 19 Aug 2018 08:49 )  PTT:29.8 sec  Urinalysis Basic - ( 19 Aug 2018 09:52 )    Color: Yellow / Appearance: Clear / SG: <=1.005 / pH: x  Gluc: x / Ketone: NEGATIVE  / Bili: Negative / Urobili: 0.2 E.U./dL   Blood: x / Protein: Trace mg/dL / Nitrite: NEGATIVE   Leuk Esterase: NEGATIVE / RBC: 5-10 /HPF / WBC < 5 /HPF   Sq Epi: x / Non Sq Epi: 0-5 /HPF / Bacteria: Present /HPF          Serum Pro-Brain Natriuretic Peptide: 360 pg/mL (08-19 @ 08:22)        RADIOLOGY, EKG & ADDITIONAL TESTS: Reviewed.    < from: CT Abdomen and Pelvis No Cont (08.19.18 @ 10:00) >    IMPRESSION:    1.  Extensive lymphadenopathy most pronounced in theleft supraclavicular   region, left axilla, posterior mediastinal and retroperitoneum with bulky   extensive periaortic and interaortocaval lymphadenopathy concerning for   non-Hodgkin's lymphoma.   2.  Marked left and mild right hydronephrosis likely secondary to mass   effect from the enlarged lymph nodes.   3.  Perinephric and mesenteric fat stranding as well as marked bladder   wall thickening and perivesical fat plane stranding. Correlate clinically   for cystitis and pyelonephritis.  4.  Clustered grouping of nodular opacities seen within the apical   posterior segment of the left upper lobe with some minimal adjacent   pleural parenchymal scarring. Abbreviated differential includes   postinflammatory, infectious and/or neoplastic. The largest nodule   measuring 8 mm. Short interval thoracic CT surveillance to confirm   stability.    < end of copied text >    < from: US Duplex Venous Lower Ext Complete, Bilateral (08.19.18 @ 11:59) >      IMPRESSION:  1.  There is thrombosis of a left calf muscular vein.  2.  Bilateral inguinal lymph nodes.    < from: US Retroperitoneal Complete (08.19.18 @ 11:31) >      IMPRESSION:  1.  Kidneys are large in size bilaterally. An infiltrative process cannot   be excluded. Severe left hydronephrosis and mild right hydronephrosis.    2.  Mild bladder wall thickening. Correlate with urinalysis for   infection. Cystoscopic correlation may be in order.  3.  Increased postvoid residual of 80 cc.

## 2018-08-19 NOTE — H&P ADULT - PROBLEM SELECTOR PLAN 2
Pt with c/o Left neck swelling, found to have matting lymphadenopathy. CT scan shows diffuse lymphadenopathy.  -Heme/Onc consulted.   -Recommend lymph node biopsy by surgery. Surgery consulted.   -NPO midnight   -Whole body PET scan.   -Heme/onc to do bone marrow biopsy.

## 2018-08-19 NOTE — ED PROVIDER NOTE - MUSCULOSKELETAL, MLM
Spine appears normal, range of motion is not limited, no muscle or joint tenderness, 3+ bilat le edema w/o erythema, no ttp, dp 1+

## 2018-08-19 NOTE — H&P ADULT - PROBLEM SELECTOR PLAN 6
Pt with findings of b/l hydronephrosis. Likely obstructive in nature 2/2 diffuse RP lymphadenopathy.   -Pt may need b/l nephrostomy' s.   -Urology consulted.     **Findings of Cystitis on CT scan.  Pt with no complaints of suprapubic tenderness or dysuria. Will hold off ABX.

## 2018-08-19 NOTE — ED PROVIDER NOTE - ENMT, MLM
Airway patent, Nasal mucosa clear. Mouth with normal mucosa. Throat has no vesicles, no oropharyngeal exudates and uvula is midline. L neck w nontender swelling that is firm and feels like mult separate masses

## 2018-08-19 NOTE — CONSULT NOTE ADULT - ATTENDING COMMENTS
bilateral hydroneprhosis secondary to bulkylymphadenopathy  discussed with him and his wife  to OR for attmept at bilateral stent insertion  understands if fails may require PCN  understands stents must be changed in 3 months or removed entirely  will consider stent removal after definitive treatment

## 2018-08-19 NOTE — CONSULT NOTE ADULT - ASSESSMENT
57 yo M presenting with b/l lower ext edema and diffuse lymphadenopathy apparent on CT imaging, concerning for underlying lymphoproliferative disorder    # Lymphadenopathy  - CT w/ evidence diffuse LAD in left supraclavicular region, left axilla, posterior mediastinal and retroperitoneum with bulky extensive periaortic and interaortocaval lymphadenopathy concerning for lymphoproliferative disorder  - plan for Whole Body PET/CT for staging.  - consult general surgery regarding excisional bx for pathologic diagnosis  - evidence of TLS with renal failure, hyperuricemia- on allopurinol, IVFs, may need to use rasburicase therapy if no improvement in uric acid    # Tumor lysis syndrome  - 2/2 to underlying lymphoproliferative disorder, evidence of renal dysfunction likely multifactorial 2/2 to obstruction and hyperuricemia  - c/w IVFs, allopurinol 300 mg daily if CrCl >30. If no improvement in uric acid level, will consider rasburicase therapy. check potassium, calcium, phosphorus levels daily 59 yo M presenting with b/l lower ext edema and diffuse lymphadenopathy apparent on CT imaging, concerning for underlying lymphoproliferative disorder    # Lymphadenopathy  - CT w/ evidence diffuse LAD in left supraclavicular region, left axilla, posterior mediastinal and retroperitoneum with bulky extensive periaortic and interaortocaval lymphadenopathy concerning for lymphoproliferative disorder. elevated LDH and uric acid consistent with underlying highly proliferative malignancy, haptoglobin normal- no evidence of hemolysis. Peripheral smear shows atypical lymphocytes.  - plan for Whole Body PET/CT for staging.  - consult general surgery regarding excisional bx for pathologic diagnosis  - evidence of TLS with renal failure, hyperuricemia- on allopurinol, IVFs, will give one dose of rasburicase 6 mg IV. check f/u uric acid in 4 hrs and 12 hrs after rasburicase administration  - check hepatitis panel  - perform 2D Echocardiogram  - urology consult for obstructive nephropathy 2/2 to bulky RP LAD  - Pt advised to stay in hospital and be admitted for initial work up especially in setting of ARF failure 2/2 to obstruction from bulky RP LAD. He was told that he may have permanent damage to his kidneys and may also die if he leaves hospital without receiving appropriate care.     # Tumor lysis syndrome  - 2/2 to underlying lymphoproliferative disorder, evidence of renal dysfunction likely multifactorial 2/2 to obstruction and hyperuricemia  - c/w IVFs, allopurinol 300 mg daily if CrCl >30. Given evidence of LDH .2x ULN, uric acid >8 and Cr >1.5, pt is a candidate for rasburicase therapy to lower uric acid levels. Will give rasburicase 6 mg IV x 1. f/u uric acid level 4 hrs and 12 hrs after administration. check potassium, calcium, phosphorus levels daily 57 yo M presenting with b/l lower ext edema and diffuse lymphadenopathy apparent on CT imaging, concerning for underlying lymphoproliferative disorder    # Lymphadenopathy  - CT w/ evidence diffuse LAD in left supraclavicular region, left axilla, posterior mediastinal and retroperitoneum with bulky extensive periaortic and interaortocaval lymphadenopathy concerning for lymphoproliferative disorder. elevated LDH and uric acid consistent with underlying highly proliferative malignancy, haptoglobin normal- no evidence of hemolysis. Peripheral smear shows atypical lymphocytes.  - plan for Whole Body PET/CT for staging.  - consult general surgery regarding excisional bx for pathologic diagnosis  - evidence of TLS with renal failure, hyperuricemia- on allopurinol, IVFs, will give one dose of rasburicase 6 mg IV. check f/u uric acid in 4 hrs and 12 hrs after rasburicase administration  - check hepatitis panel  - perform 2D Echocardiogram  - urology consult for obstructive nephropathy 2/2 to bulky RP LAD  - bone marrow bx nelly am  - Pt advised to stay in hospital and be admitted for initial work up especially in setting of ARF failure 2/2 to obstruction from bulky RP LAD. He was told that he may have permanent damage to his kidneys and may also die if he leaves hospital without receiving appropriate care.     # Tumor lysis syndrome  - 2/2 to underlying lymphoproliferative disorder, evidence of renal dysfunction likely multifactorial 2/2 to obstruction and hyperuricemia  - c/w IVFs, allopurinol 300 mg daily if CrCl >30. Given evidence of LDH 2x ULN, uric acid >8 and Cr >1.5, pt is a candidate for rasburicase therapy to lower uric acid levels. Will give rasburicase 6 mg IV x 1. f/u uric acid level 4 hrs and 12 hrs after administration. check potassium, calcium, phosphorus levels daily    # DVT  - LE dopplers show thrombosis of a left calf muscular vein, hold of on lovenox therapy due to renal failure and need for excisional bx for dx of lymphoma  - heparin gtt    # -normocytic anemia, peripheral smear reviewed  - etiology unclear, check iron studies, Vit B12 and folate levels along with retic count
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
## 2018-08-19 NOTE — ED PROVIDER NOTE - MEDICAL DECISION MAKING DETAILS
Pt c/o leg swelling and L neck mass x ~ 3 wks.  Neck mass - ? undiagnosed ca - lymphoma (fh of this), gastric ca w virchow's node; no sig concern for deep space neck infection.  LE edema - ? dvt, chf (no cardiac or resp complaints), venous stasis.  Plan ct chest/abd/pelvis, doppler u/s, labs, reassess.

## 2018-08-19 NOTE — H&P ADULT - PROBLEM SELECTOR PLAN 4
Pt with normocytic anemia on labs. Likely 2/2 possible lymphoma.   -Follow anemia labs. Iron studies, Folic acid levels, Vit b12 levels, Retic.

## 2018-08-19 NOTE — ED PROVIDER NOTE - CARE PLAN
Principal Discharge DX:	Lymphoma Principal Discharge DX:	Lymphoma  Secondary Diagnosis:	ARF (acute renal failure)

## 2018-08-19 NOTE — H&P ADULT - ASSESSMENT
58y M w/ PMH of HCV and alcohol abuse presenting with persistent leg edema found to have LLE DVT and CT findings concerning for lymphoma. 58y M w/ PMH of HCV and alcohol abuse presenting with persistent leg edema found to have LLE DVT and CT findings concerning for lymphoma. Admitted to medical floors for w/up for Lymphoma and treatment of DVT.

## 2018-08-19 NOTE — ED PROVIDER NOTE - PROGRESS NOTE DETAILS
Pt w arf - reports voiding fully and recent hematuria, now resolved.  Retroperitoneal u/s and ua added to eval, ivf ordered. Pt's ct c/w diffuse lymphoma.  Heme consulted and request additional labs; they will eval.  Pt also w L calf dvt.  Renal u/s shows bilat hydro, small pvr 80 and diffuse bladder wall thickening.  Plan to admit. Pt does not want to be admitted, await heme to discuss dx/findings w pt and try to get pt to agree to stay. Mark lamb pt and pt agreed to admit - they request pet scan and surg consult for biopsy; surg contacted and request inpt team consult them.  Will defer pet scan to inpt team. Inpt team request gu consult - gu consulted and will eval.

## 2018-08-19 NOTE — PATIENT PROFILE ADULT. - FUNCTIONAL SCREEN CURRENT LEVEL: SWALLOWING (IF SCORE 2 OR MORE FOR ANY ITEM, CONSULT REHAB SERVICES), MLM)
Principal Discharge DX:	Carcinomatosis  Goal:	home hospice/comfort care  Assessment and plan of treatment:	meds as indicated  Secondary Diagnosis:	Metastatic breast cancer
(0) swallows foods/liquids without difficulty

## 2018-08-19 NOTE — H&P ADULT - PROBLEM SELECTOR PLAN 7
Pt with c/o b/l lower extremity edema.  Pitting in nature b/l +  Possible likely IVC obstruction? 2/2 lymphadenopathy vs cardiac in origin.  Will ECHO in AM.

## 2018-08-19 NOTE — H&P ADULT - FAMILY HISTORY
Mother  Still living? No  Family history of lymphoma, Age at diagnosis: Age Unknown     Sibling  Still living? Yes, Estimated age: Age Unknown  Family history of lymphoma, Age at diagnosis: Age Unknown

## 2018-08-19 NOTE — CONSULT NOTE ADULT - ASSESSMENT
58y M w/ PMH of Hep C and alcohol abuse presenting with persistent leg swelling for the last 3 weeks, left sided neck swelling and recent hematuria.  CT SCan showed Bilateral Hydronephrosis and bladder wall thickening with PVR of 80 ml on US. Also see on CT scan is diffuse lymphadenopathy, concern fro Lymphoma. Creatinine 3.11    Awake and alert, NAD  Neck: left sided swelling consistent with swollem lymph nodes.  Abd: soft, w/o guarding  No CVAT  : Scrotum markedly edematous.  Testes x 2 decended , no pain, with palpation.  Bilateral LE marked LE edema , mild pitting. 58y M w/ PMH of Hep C and alcohol abuse presenting with persistent leg swelling for the last 3 weeks, left sided neck swelling and recent hematuria.  CT SCan showed Bilateral Hydronephrosis and bladder wall thickening with PVR of 80 ml on US. Also see on CT scan is diffuse lymphadenopathy, concern fro Lymphoma. Creatinine 3.11    Vital Signs Last 24 Hrs  T(C): 36.8 (19 Aug 2018 18:54), Max: 36.8 (19 Aug 2018 07:51)  T(F): 98.3 (19 Aug 2018 18:54), Max: 98.3 (19 Aug 2018 18:54)  HR: 78 (19 Aug 2018 18:54) (66 - 95)  BP: 145/91 (19 Aug 2018 18:54) (129/84 - 145/91)  BP(mean): --  RR: 20 (19 Aug 2018 18:54) (16 - 20)  SpO2: 99% (19 Aug 2018 18:54) (95% - 100%)    Awake and alert, NAD  Neck: left sided swelling consistent with swollem lymph nodes.  Abd: soft, w/o guarding  No CVAT  : Scrotum markedly edematous.  Testes x 2 decended , no pain, with palpation.  Bilateral LE marked LE edema , mild pitting.                          10.8   6.4   )-----------( 210      ( 19 Aug 2018 08:22 )             31.7   08-19    134<L>  |  95<L>  |  54<H>  ----------------------------<  102<H>  4.8   |  23  |  3.11<H>    Ca    9.0      19 Aug 2018 08:22    TPro  7.5  /  Alb  3.9  /  TBili  0.4  /  DBili  x   /  AST  30  /  ALT  20  /  AlkPhos  56  08-19    I&O's Detail    19 Aug 2018 07:01  -  19 Aug 2018 20:15  --------------------------------------------------------  IN:  Total IN: 0 mL    OUT:    Voided: 450 mL  Total OUT: 450 mL    Total NET: -450 mL

## 2018-08-19 NOTE — H&P ADULT - NSHPSOCIALHISTORY_GEN_ALL_CORE
never smoked  History of alcohol abuse in remission since 1993, attending AA meetings regularly  Denies current drug use, remote history of acid, marijuana, and cocaine.

## 2018-08-19 NOTE — H&P ADULT - PROBLEM SELECTOR PLAN 10
1) PCP Contacted on Admission: (Y/N) --> Name & Phone #: NONE.   2) Date of Contact with PCP:  3) PCP Contacted at Discharge: (Y/N, N/A)  4) Summary of Handoff Given to PCP:   5) Post-Discharge Appointment Date and Location:

## 2018-08-19 NOTE — H&P ADULT - PROBLEM SELECTOR PLAN 3
Pt with elevated Uric acid levels >8, KIRSTEN (3.11), elevated LDH.  Will give Rasburicase 6mg IV once.   Measure Uric acid level in the am   Can also c/w Allopurinol 300mg daily.   C/w IV hydration and monitor resp status.

## 2018-08-19 NOTE — H&P ADULT - PROBLEM SELECTOR PLAN 8
Pt with findings of elevated creatinine from baseline.   2/2 TLS vs obstruction from lymphadenopathy.   -Get urine lytes.
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
## 2018-08-20 ENCOUNTER — RESULT REVIEW (OUTPATIENT)
Age: 58
End: 2018-08-20

## 2018-08-20 LAB
ALBUMIN SERPL ELPH-MCNC: 3.4 G/DL — SIGNIFICANT CHANGE UP (ref 3.3–5)
ALP SERPL-CCNC: 50 U/L — SIGNIFICANT CHANGE UP (ref 40–120)
ALT FLD-CCNC: 15 U/L — SIGNIFICANT CHANGE UP (ref 10–45)
ANION GAP SERPL CALC-SCNC: 15 MMOL/L — SIGNIFICANT CHANGE UP (ref 5–17)
APTT BLD: 27.8 SEC — SIGNIFICANT CHANGE UP (ref 27.5–37.4)
APTT BLD: 40.9 SEC — HIGH (ref 27.5–37.4)
APTT BLD: 43.6 SEC — HIGH (ref 27.5–37.4)
APTT BLD: 54.3 SEC — HIGH (ref 27.5–37.4)
AST SERPL-CCNC: 23 U/L — SIGNIFICANT CHANGE UP (ref 10–40)
BASOPHILS NFR BLD AUTO: 0.6 % — SIGNIFICANT CHANGE UP (ref 0–2)
BILIRUB SERPL-MCNC: 0.2 MG/DL — SIGNIFICANT CHANGE UP (ref 0.2–1.2)
BUN SERPL-MCNC: 51 MG/DL — HIGH (ref 7–23)
CALCIUM SERPL-MCNC: 8.1 MG/DL — LOW (ref 8.4–10.5)
CHLORIDE SERPL-SCNC: 102 MMOL/L — SIGNIFICANT CHANGE UP (ref 96–108)
CK MB CFR SERPL CALC: 1.4 NG/ML — SIGNIFICANT CHANGE UP (ref 0–6.7)
CK SERPL-CCNC: 94 U/L — SIGNIFICANT CHANGE UP (ref 30–200)
CO2 SERPL-SCNC: 20 MMOL/L — LOW (ref 22–31)
CREAT SERPL-MCNC: 3.14 MG/DL — HIGH (ref 0.5–1.3)
EOSINOPHIL NFR BLD AUTO: 7.5 % — HIGH (ref 0–6)
FERRITIN SERPL-MCNC: 612 NG/ML — HIGH (ref 30–400)
FOLATE SERPL-MCNC: 14 NG/ML — SIGNIFICANT CHANGE UP
GLUCOSE BLDC GLUCOMTR-MCNC: 101 MG/DL — HIGH (ref 70–99)
GLUCOSE BLDC GLUCOMTR-MCNC: 95 MG/DL — SIGNIFICANT CHANGE UP (ref 70–99)
GLUCOSE SERPL-MCNC: 102 MG/DL — HIGH (ref 70–99)
HCT VFR BLD CALC: 29.6 % — LOW (ref 39–50)
HGB BLD-MCNC: 9.9 G/DL — LOW (ref 13–17)
HIV 1+2 AB+HIV1 P24 AG SERPL QL IA: SIGNIFICANT CHANGE UP
INR BLD: 0.98 — SIGNIFICANT CHANGE UP (ref 0.88–1.16)
IRON SATN MFR SERPL: 28 % — SIGNIFICANT CHANGE UP (ref 16–55)
IRON SATN MFR SERPL: 55 UG/DL — SIGNIFICANT CHANGE UP (ref 45–165)
LDH SERPL L TO P-CCNC: 842 U/L — HIGH (ref 50–242)
LYMPHOCYTES # BLD AUTO: 14.8 % — SIGNIFICANT CHANGE UP (ref 13–44)
MAGNESIUM SERPL-MCNC: 2.5 MG/DL — SIGNIFICANT CHANGE UP (ref 1.6–2.6)
MCHC RBC-ENTMCNC: 32.5 PG — SIGNIFICANT CHANGE UP (ref 27–34)
MCHC RBC-ENTMCNC: 33.4 G/DL — SIGNIFICANT CHANGE UP (ref 32–36)
MCV RBC AUTO: 97 FL — SIGNIFICANT CHANGE UP (ref 80–100)
MONOCYTES NFR BLD AUTO: 13.7 % — SIGNIFICANT CHANGE UP (ref 2–14)
NEUTROPHILS NFR BLD AUTO: 63.4 % — SIGNIFICANT CHANGE UP (ref 43–77)
PHOSPHATE SERPL-MCNC: 4.6 MG/DL — HIGH (ref 2.5–4.5)
PLATELET # BLD AUTO: 195 K/UL — SIGNIFICANT CHANGE UP (ref 150–400)
POTASSIUM SERPL-MCNC: 5 MMOL/L — SIGNIFICANT CHANGE UP (ref 3.5–5.3)
POTASSIUM SERPL-SCNC: 5 MMOL/L — SIGNIFICANT CHANGE UP (ref 3.5–5.3)
PROT SERPL-MCNC: 6.8 G/DL — SIGNIFICANT CHANGE UP (ref 6–8.3)
PROTHROM AB SERPL-ACNC: 10.9 SEC — SIGNIFICANT CHANGE UP (ref 9.8–12.7)
RBC # BLD: 3.05 M/UL — LOW (ref 4.2–5.8)
RBC # BLD: 3.05 M/UL — LOW (ref 4.2–5.8)
RBC # FLD: 11.6 % — SIGNIFICANT CHANGE UP (ref 10.3–16.9)
RETICS/RBC NFR: 1 % — SIGNIFICANT CHANGE UP (ref 0.5–2.5)
SODIUM SERPL-SCNC: 137 MMOL/L — SIGNIFICANT CHANGE UP (ref 135–145)
TIBC SERPL-MCNC: 197 UG/DL — LOW (ref 220–430)
UIBC SERPL-MCNC: 142 UG/DL — SIGNIFICANT CHANGE UP (ref 110–370)
URATE SERPL-MCNC: 1.7 MG/DL — LOW (ref 3.4–8.8)
URATE SERPL-MCNC: 2.9 MG/DL — LOW (ref 3.4–8.8)
VIT B12 SERPL-MCNC: 398 PG/ML — SIGNIFICANT CHANGE UP (ref 232–1245)
WBC # BLD: 5.3 K/UL — SIGNIFICANT CHANGE UP (ref 3.8–10.5)
WBC # FLD AUTO: 5.3 K/UL — SIGNIFICANT CHANGE UP (ref 3.8–10.5)

## 2018-08-20 PROCEDURE — 52332 CYSTOSCOPY AND TREATMENT: CPT | Mod: 50

## 2018-08-20 PROCEDURE — 99233 SBSQ HOSP IP/OBS HIGH 50: CPT | Mod: GC

## 2018-08-20 PROCEDURE — 74420 UROGRAPHY RTRGR +-KUB: CPT | Mod: 26

## 2018-08-20 PROCEDURE — 99255 IP/OBS CONSLTJ NEW/EST HI 80: CPT | Mod: 25

## 2018-08-20 RX ORDER — DIPHENHYDRAMINE HCL 50 MG
50 CAPSULE ORAL EVERY 4 HOURS
Qty: 0 | Refills: 0 | Status: DISCONTINUED | OUTPATIENT
Start: 2018-08-20 | End: 2018-08-27

## 2018-08-20 RX ORDER — HEPARIN SODIUM 5000 [USP'U]/ML
1800 INJECTION INTRAVENOUS; SUBCUTANEOUS
Qty: 25000 | Refills: 0 | Status: DISCONTINUED | OUTPATIENT
Start: 2018-08-20 | End: 2018-08-20

## 2018-08-20 RX ORDER — LIDOCAINE 4 G/100G
1 CREAM TOPICAL
Qty: 0 | Refills: 0 | Status: DISCONTINUED | OUTPATIENT
Start: 2018-08-20 | End: 2018-08-23

## 2018-08-20 RX ORDER — ACETAMINOPHEN 500 MG
1000 TABLET ORAL ONCE
Qty: 0 | Refills: 0 | Status: DISCONTINUED | OUTPATIENT
Start: 2018-08-20 | End: 2018-08-21

## 2018-08-20 RX ORDER — HEPARIN SODIUM 5000 [USP'U]/ML
2000 INJECTION INTRAVENOUS; SUBCUTANEOUS
Qty: 25000 | Refills: 0 | Status: DISCONTINUED | OUTPATIENT
Start: 2018-08-20 | End: 2018-08-23

## 2018-08-20 RX ORDER — HEPARIN SODIUM 5000 [USP'U]/ML
1700 INJECTION INTRAVENOUS; SUBCUTANEOUS
Qty: 25000 | Refills: 0 | Status: DISCONTINUED | OUTPATIENT
Start: 2018-08-20 | End: 2018-08-20

## 2018-08-20 RX ORDER — LIDOCAINE HCL 20 MG/ML
50 VIAL (ML) INJECTION ONCE
Qty: 0 | Refills: 0 | Status: COMPLETED | OUTPATIENT
Start: 2018-08-20 | End: 2018-08-20

## 2018-08-20 RX ORDER — OXYBUTYNIN CHLORIDE 5 MG
5 TABLET ORAL EVERY 8 HOURS
Qty: 0 | Refills: 0 | Status: DISCONTINUED | OUTPATIENT
Start: 2018-08-20 | End: 2018-08-26

## 2018-08-20 RX ORDER — HEPARIN SODIUM 5000 [USP'U]/ML
1900 INJECTION INTRAVENOUS; SUBCUTANEOUS
Qty: 25000 | Refills: 0 | Status: DISCONTINUED | OUTPATIENT
Start: 2018-08-20 | End: 2018-08-20

## 2018-08-20 RX ORDER — LIDOCAINE HCL 20 MG/ML
10 VIAL (ML) INJECTION ONCE
Qty: 0 | Refills: 0 | Status: DISCONTINUED | OUTPATIENT
Start: 2018-08-20 | End: 2018-08-20

## 2018-08-20 RX ORDER — HEPARIN SODIUM 5000 [USP'U]/ML
2000 INJECTION INTRAVENOUS; SUBCUTANEOUS
Qty: 25000 | Refills: 0 | Status: DISCONTINUED | OUTPATIENT
Start: 2018-08-20 | End: 2018-08-20

## 2018-08-20 RX ADMIN — Medication 50 MILLIGRAM(S): at 03:00

## 2018-08-20 RX ADMIN — Medication 50 MILLILITER(S): at 13:35

## 2018-08-20 RX ADMIN — LIDOCAINE 1 APPLICATION(S): 4 CREAM TOPICAL at 19:30

## 2018-08-20 RX ADMIN — Medication 300 MILLIGRAM(S): at 11:39

## 2018-08-20 RX ADMIN — HEPARIN SODIUM 19 UNIT(S)/HR: 5000 INJECTION INTRAVENOUS; SUBCUTANEOUS at 10:36

## 2018-08-20 RX ADMIN — HEPARIN SODIUM 19 UNIT(S)/HR: 5000 INJECTION INTRAVENOUS; SUBCUTANEOUS at 18:22

## 2018-08-20 RX ADMIN — HEPARIN SODIUM 19 UNIT(S)/HR: 5000 INJECTION INTRAVENOUS; SUBCUTANEOUS at 08:44

## 2018-08-20 RX ADMIN — SODIUM CHLORIDE 125 MILLILITER(S): 9 INJECTION INTRAMUSCULAR; INTRAVENOUS; SUBCUTANEOUS at 18:03

## 2018-08-20 RX ADMIN — HEPARIN SODIUM 17 UNIT(S)/HR: 5000 INJECTION INTRAVENOUS; SUBCUTANEOUS at 03:08

## 2018-08-20 RX ADMIN — LIDOCAINE 1 APPLICATION(S): 4 CREAM TOPICAL at 21:46

## 2018-08-20 RX ADMIN — Medication 5 MILLIGRAM(S): at 23:35

## 2018-08-20 RX ADMIN — Medication 50 MILLIGRAM(S): at 22:36

## 2018-08-20 NOTE — PROGRESS NOTE ADULT - ASSESSMENT
58y M w/ PMH of Hep C and alcohol abuse presenting with persistent leg swelling for the last 3 weeks, left sided neck swelling  -b/l hydronephrosis likely 2/2 malignant obstruction  -will add on today for cystoscopy, b/l ureteral stent placement  -keep npo
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
## 2018-08-20 NOTE — PROGRESS NOTE ADULT - SUBJECTIVE AND OBJECTIVE BOX
Hematology Oncology Consult Progress Note (Dr. Jung)    SUBJECTIVE / INTERVAL HPI: Patient seen and examined at bedside.     Patient denies any new complaints at this time. He reports persistent swelling to the BLE. He reports feeling that having nephrostomy tubes would be too much and does not want them. He currently denies any fatigue, fevers, chills, nausea, abdominal pain, chest pain, dyspnea.       VITAL SIGNS:  Vital Signs Last 24 Hrs  T(C): 36.7 (20 Aug 2018 05:23), Max: 36.8 (19 Aug 2018 18:54)  T(F): 98 (20 Aug 2018 05:23), Max: 98.3 (19 Aug 2018 18:54)  HR: 72 (20 Aug 2018 05:23) (66 - 78)  BP: 117/79 (20 Aug 2018 05:23) (117/79 - 145/91)  BP(mean): --  RR: 18 (20 Aug 2018 05:23) (16 - 20)  SpO2: 98% (20 Aug 2018 05:23) (98% - 100%)    PHYSICAL EXAM:    General: well appearing, sitting in chair, and in no acute distress  Skin: (+) venous stasis changes to BLE  HEENT: normocephalic, atraumatic, PERRL, anicteric sclera; no conjunctival pallor, mucus membranes moist  Neck: supple, no JVD  Cardiovascular: +S1/S2, regular rate and rhythm; no murmurs, no rub, no gallop  Respiratory: clear to auscultation bilaterally, no rhonchi, no wheeze, no rales  Gastrointestinal: soft, active bowel sounds, non-tender, non-distended  Extremities: Warm, dry; no clubbing or cyanosis, (+) 3+ pitting edema to BLE up to the knees. No tenderness.   Lymph nodes: (+) large palpable L supraclavicular LAD, (+) BLE axillary and inguinal LAD  Vascular: 2+ radial, DP pulses bilaterally  Neurological: AAOx3; no focal deficits    MEDICATIONS:  MEDICATIONS  (STANDING):  allopurinol 300 milliGRAM(s) Oral daily  heparin  Infusion 1900 Unit(s)/Hr (19 mL/Hr) IV Continuous <Continuous>  heparin  Infusion 1900 Unit(s)/Hr (19 mL/Hr) IV Continuous <Continuous>  melatonin 5 milliGRAM(s) Oral at bedtime  sodium chloride 0.9%. 1000 milliLiter(s) (125 mL/Hr) IV Continuous <Continuous>    MEDICATIONS  (PRN):  diphenhydrAMINE   Capsule 50 milliGRAM(s) Oral every 4 hours PRN Rash and/or Itching      ALLERGIES:  Allergies    No Known Allergies    Intolerances        LABS:                        9.9    5.3   )-----------( 195      ( 20 Aug 2018 06:42 )             29.6     08-20    137  |  102  |  51<H>  ----------------------------<  102<H>  5.0   |  20<L>  |  3.14<H>    Ca    8.1<L>      20 Aug 2018 06:42  Phos  4.6     08-20  Mg     2.5     08-20    TPro  6.8  /  Alb  3.4  /  TBili  0.2  /  DBili  x   /  AST  23  /  ALT  15  /  AlkPhos  50  08-20    PT/INR - ( 20 Aug 2018 06:42 )   PT: 10.9 sec;   INR: 0.98          PTT - ( 20 Aug 2018 06:42 )  PTT:43.6 sec  Urinalysis Basic - ( 19 Aug 2018 09:52 )    Color: Yellow / Appearance: Clear / SG: <=1.005 / pH: x  Gluc: x / Ketone: NEGATIVE  / Bili: Negative / Urobili: 0.2 E.U./dL   Blood: x / Protein: Trace mg/dL / Nitrite: NEGATIVE   Leuk Esterase: NEGATIVE / RBC: 5-10 /HPF / WBC < 5 /HPF   Sq Epi: x / Non Sq Epi: 0-5 /HPF / Bacteria: Present /HPF      CAPILLARY BLOOD GLUCOSE      POCT Blood Glucose.: 101 mg/dL (20 Aug 2018 10:12)      RADIOLOGY & ADDITIONAL TESTS: Reviewed. Hematology Oncology Consult Progress Note (Dr. Jung)    SUBJECTIVE / INTERVAL HPI: Patient seen and examined at bedside.     Patient denies any new complaints at this time. He reports persistent swelling to the BLE but has been able to get out of bed. He reports he does not want nephrostomy tubes as he feels it is too extreme; he feels as though his condition will improve with diuretics. He currently denies any fatigue, fevers, chills, nausea, abdominal pain, chest pain, dyspnea.       VITAL SIGNS:  Vital Signs Last 24 Hrs  T(C): 36.7 (20 Aug 2018 05:23), Max: 36.8 (19 Aug 2018 18:54)  T(F): 98 (20 Aug 2018 05:23), Max: 98.3 (19 Aug 2018 18:54)  HR: 72 (20 Aug 2018 05:23) (66 - 78)  BP: 117/79 (20 Aug 2018 05:23) (117/79 - 145/91)  BP(mean): --  RR: 18 (20 Aug 2018 05:23) (16 - 20)  SpO2: 98% (20 Aug 2018 05:23) (98% - 100%)    PHYSICAL EXAM:    General: well appearing, sitting in chair, and in no acute distress  Skin: (+) venous stasis changes to BLE  HEENT: normocephalic, atraumatic, PERRL, anicteric sclera; no conjunctival pallor, mucus membranes moist  Neck: supple, no JVD  Cardiovascular: +S1/S2, regular rate and rhythm; no murmurs, no rub, no gallop  Respiratory: clear to auscultation bilaterally, no rhonchi, no wheeze, no rales  Gastrointestinal: soft, active bowel sounds, non-tender, non-distended  Extremities: Warm, dry; no clubbing or cyanosis, (+) 3+ pitting edema to BLE up to the knees. No tenderness. (+) L elbow w/ soft bulbous mass without any tenderness to palpation  Lymph nodes: (+) large palpable L supraclavicular LAD, (+) BLE axillary and inguinal LAD  Vascular: 2+ radial, DP pulses bilaterally  Neurological: AAOx3; no focal deficits    MEDICATIONS:  MEDICATIONS  (STANDING):  allopurinol 300 milliGRAM(s) Oral daily  heparin  Infusion 1900 Unit(s)/Hr (19 mL/Hr) IV Continuous <Continuous>  heparin  Infusion 1900 Unit(s)/Hr (19 mL/Hr) IV Continuous <Continuous>  melatonin 5 milliGRAM(s) Oral at bedtime  sodium chloride 0.9%. 1000 milliLiter(s) (125 mL/Hr) IV Continuous <Continuous>    MEDICATIONS  (PRN):  diphenhydrAMINE   Capsule 50 milliGRAM(s) Oral every 4 hours PRN Rash and/or Itching      ALLERGIES:  Allergies    No Known Allergies    Intolerances        LABS:                        9.9    5.3   )-----------( 195      ( 20 Aug 2018 06:42 )             29.6     08-20    137  |  102  |  51<H>  ----------------------------<  102<H>  5.0   |  20<L>  |  3.14<H>    Ca    8.1<L>      20 Aug 2018 06:42  Phos  4.6     08-20  Mg     2.5     08-20    TPro  6.8  /  Alb  3.4  /  TBili  0.2  /  DBili  x   /  AST  23  /  ALT  15  /  AlkPhos  50  08-20    PT/INR - ( 20 Aug 2018 06:42 )   PT: 10.9 sec;   INR: 0.98          PTT - ( 20 Aug 2018 06:42 )  PTT:43.6 sec  Urinalysis Basic - ( 19 Aug 2018 09:52 )    Color: Yellow / Appearance: Clear / SG: <=1.005 / pH: x  Gluc: x / Ketone: NEGATIVE  / Bili: Negative / Urobili: 0.2 E.U./dL   Blood: x / Protein: Trace mg/dL / Nitrite: NEGATIVE   Leuk Esterase: NEGATIVE / RBC: 5-10 /HPF / WBC < 5 /HPF   Sq Epi: x / Non Sq Epi: 0-5 /HPF / Bacteria: Present /HPF      CAPILLARY BLOOD GLUCOSE      POCT Blood Glucose.: 101 mg/dL (20 Aug 2018 10:12)      RADIOLOGY & ADDITIONAL TESTS: Reviewed. Hematology Oncology Consult Progress Note (Dr. Jung)    SUBJECTIVE / INTERVAL HPI: Patient seen and examined at bedside.     Patient denies any new complaints at this time. He reports persistent swelling to the BLE but has been able to get out of bed. He reports he does not want nephrostomy tubes as he feels it is too extreme; he feels as though his condition will improve with diuretics. He is urinating on his own and is also refusing Sher placement to decompress his bladder. He currently denies any fatigue, fevers, chills, nausea, abdominal pain, chest pain, dyspnea.       VITAL SIGNS:  Vital Signs Last 24 Hrs  T(C): 36.7 (20 Aug 2018 05:23), Max: 36.8 (19 Aug 2018 18:54)  T(F): 98 (20 Aug 2018 05:23), Max: 98.3 (19 Aug 2018 18:54)  HR: 72 (20 Aug 2018 05:23) (66 - 78)  BP: 117/79 (20 Aug 2018 05:23) (117/79 - 145/91)  BP(mean): --  RR: 18 (20 Aug 2018 05:23) (16 - 20)  SpO2: 98% (20 Aug 2018 05:23) (98% - 100%)    PHYSICAL EXAM:    General: well appearing, sitting in chair, and in no acute distress  Skin: (+) venous stasis changes to BLE  HEENT: normocephalic, atraumatic, PERRL, anicteric sclera; no conjunctival pallor, mucus membranes moist  Neck: supple, no JVD  Cardiovascular: +S1/S2, regular rate and rhythm; no murmurs, no rub, no gallop  Respiratory: clear to auscultation bilaterally, no rhonchi, no wheeze, no rales  Gastrointestinal: soft, active bowel sounds, non-tender, non-distended  Extremities: Warm, dry; no clubbing or cyanosis, (+) 3+ pitting edema to BLE up to the knees. No tenderness. (+) L elbow w/ soft bulbous mass without any tenderness to palpation  Lymph nodes: (+) large palpable L supraclavicular LAD, (+) BLE axillary and inguinal LAD  Vascular: 2+ radial, DP pulses bilaterally  Neurological: AAOx3; no focal deficits    MEDICATIONS:  MEDICATIONS  (STANDING):  allopurinol 300 milliGRAM(s) Oral daily  heparin  Infusion 1900 Unit(s)/Hr (19 mL/Hr) IV Continuous <Continuous>  heparin  Infusion 1900 Unit(s)/Hr (19 mL/Hr) IV Continuous <Continuous>  melatonin 5 milliGRAM(s) Oral at bedtime  sodium chloride 0.9%. 1000 milliLiter(s) (125 mL/Hr) IV Continuous <Continuous>    MEDICATIONS  (PRN):  diphenhydrAMINE   Capsule 50 milliGRAM(s) Oral every 4 hours PRN Rash and/or Itching      ALLERGIES:  Allergies    No Known Allergies    Intolerances        LABS:                        9.9    5.3   )-----------( 195      ( 20 Aug 2018 06:42 )             29.6     08-20    137  |  102  |  51<H>  ----------------------------<  102<H>  5.0   |  20<L>  |  3.14<H>    Ca    8.1<L>      20 Aug 2018 06:42  Phos  4.6     08-20  Mg     2.5     08-20    TPro  6.8  /  Alb  3.4  /  TBili  0.2  /  DBili  x   /  AST  23  /  ALT  15  /  AlkPhos  50  08-20    PT/INR - ( 20 Aug 2018 06:42 )   PT: 10.9 sec;   INR: 0.98          PTT - ( 20 Aug 2018 06:42 )  PTT:43.6 sec  Urinalysis Basic - ( 19 Aug 2018 09:52 )    Color: Yellow / Appearance: Clear / SG: <=1.005 / pH: x  Gluc: x / Ketone: NEGATIVE  / Bili: Negative / Urobili: 0.2 E.U./dL   Blood: x / Protein: Trace mg/dL / Nitrite: NEGATIVE   Leuk Esterase: NEGATIVE / RBC: 5-10 /HPF / WBC < 5 /HPF   Sq Epi: x / Non Sq Epi: 0-5 /HPF / Bacteria: Present /HPF      CAPILLARY BLOOD GLUCOSE      POCT Blood Glucose.: 101 mg/dL (20 Aug 2018 10:12)      RADIOLOGY & ADDITIONAL TESTS: Reviewed. Hematology Oncology Consult Progress Note (Dr. Jung)    SUBJECTIVE / INTERVAL HPI: Patient seen and examined at bedside.     Patient denies any new complaints at this time. He reports persistent swelling to the BLE but has been able to get out of bed. He reports he does not want nephrostomy tubes as he feels it is too extreme; he feels as though his condition will improve with diuretics. He is urinating on his own and is also refusing Sher placement to decompress his bladder. He currently denies any fatigue, fevers, chills, nausea, abdominal pain, chest pain, dyspnea.       VITAL SIGNS:  Vital Signs Last 24 Hrs  T(C): 36.7 (20 Aug 2018 05:23), Max: 36.8 (19 Aug 2018 18:54)  T(F): 98 (20 Aug 2018 05:23), Max: 98.3 (19 Aug 2018 18:54)  HR: 72 (20 Aug 2018 05:23) (66 - 78)  BP: 117/79 (20 Aug 2018 05:23) (117/79 - 145/91)  BP(mean): --  RR: 18 (20 Aug 2018 05:23) (16 - 20)  SpO2: 98% (20 Aug 2018 05:23) (98% - 100%)    PHYSICAL EXAM:    General: well appearing, sitting in chair, and in no acute distress  Skin: (+) venous stasis changes to BLE  HEENT: normocephalic, atraumatic, PERRL, anicteric sclera; no conjunctival pallor, mucus membranes moist  Neck: supple, no JVD  Cardiovascular: +S1/S2, regular rate and rhythm; no murmurs, no rub, no gallop  Respiratory: clear to auscultation bilaterally, no rhonchi, no wheeze, no rales  Gastrointestinal: soft, active bowel sounds, non-tender, non-distended  Extremities: Warm, dry; no clubbing or cyanosis, (+) 4+ pitting edema to BLE up to the knees. No tenderness. (+) L elbow w/ soft bulbous mass without any tenderness to palpation  Lymph nodes: (+) large palpable L supraclavicular LAD, (+) BLE axillary and inguinal LAD  Vascular: 2+ radial, DP pulses bilaterally  Neurological: AAOx3; no focal deficits    MEDICATIONS:  MEDICATIONS  (STANDING):  allopurinol 300 milliGRAM(s) Oral daily  heparin  Infusion 1900 Unit(s)/Hr (19 mL/Hr) IV Continuous <Continuous>  heparin  Infusion 1900 Unit(s)/Hr (19 mL/Hr) IV Continuous <Continuous>  melatonin 5 milliGRAM(s) Oral at bedtime  sodium chloride 0.9%. 1000 milliLiter(s) (125 mL/Hr) IV Continuous <Continuous>    MEDICATIONS  (PRN):  diphenhydrAMINE   Capsule 50 milliGRAM(s) Oral every 4 hours PRN Rash and/or Itching      ALLERGIES:  Allergies    No Known Allergies    Intolerances        LABS:                        9.9    5.3   )-----------( 195      ( 20 Aug 2018 06:42 )             29.6     08-20    137  |  102  |  51<H>  ----------------------------<  102<H>  5.0   |  20<L>  |  3.14<H>    Ca    8.1<L>      20 Aug 2018 06:42  Phos  4.6     08-20  Mg     2.5     08-20    TPro  6.8  /  Alb  3.4  /  TBili  0.2  /  DBili  x   /  AST  23  /  ALT  15  /  AlkPhos  50  08-20    PT/INR - ( 20 Aug 2018 06:42 )   PT: 10.9 sec;   INR: 0.98          PTT - ( 20 Aug 2018 06:42 )  PTT:43.6 sec  Urinalysis Basic - ( 19 Aug 2018 09:52 )    Color: Yellow / Appearance: Clear / SG: <=1.005 / pH: x  Gluc: x / Ketone: NEGATIVE  / Bili: Negative / Urobili: 0.2 E.U./dL   Blood: x / Protein: Trace mg/dL / Nitrite: NEGATIVE   Leuk Esterase: NEGATIVE / RBC: 5-10 /HPF / WBC < 5 /HPF   Sq Epi: x / Non Sq Epi: 0-5 /HPF / Bacteria: Present /HPF      CAPILLARY BLOOD GLUCOSE      POCT Blood Glucose.: 101 mg/dL (20 Aug 2018 10:12)      RADIOLOGY & ADDITIONAL TESTS: Reviewed. Hematology Oncology Consult Progress Note (Dr. Jung)    SUBJECTIVE / INTERVAL HPI: Patient seen and examined at bedside.     Patient denies any new complaints at this time. He reports persistent swelling to the BLE but has been able to get out of bed. He reports he does not want nephrostomy tubes as he feels it is too extreme; he feels as though his condition will improve with diuretics. He is urinating on his own and is also refusing Sher placement to decompress his bladder. He currently denies any fatigue, fevers, chills, nausea, abdominal pain, chest pain, dyspnea.       VITAL SIGNS:  Vital Signs Last 24 Hrs  T(C): 36.7 (20 Aug 2018 05:23), Max: 36.8 (19 Aug 2018 18:54)  T(F): 98 (20 Aug 2018 05:23), Max: 98.3 (19 Aug 2018 18:54)  HR: 72 (20 Aug 2018 05:23) (66 - 78)  BP: 117/79 (20 Aug 2018 05:23) (117/79 - 145/91)  BP(mean): --  RR: 18 (20 Aug 2018 05:23) (16 - 20)  SpO2: 98% (20 Aug 2018 05:23) (98% - 100%)    PHYSICAL EXAM:    General: well appearing, sitting in chair, and in no acute distress  Skin: (+) venous stasis changes to BLE  HEENT: normocephalic, atraumatic, PERRL, anicteric sclera; no conjunctival pallor, mucus membranes moist  Neck: supple, no JVD  Cardiovascular: +S1/S2, regular rate and rhythm; no murmurs, no rub, no gallop  Respiratory: clear to auscultation bilaterally, decreased breath sounds B/l. no rhonchi, no wheeze, no rales  Gastrointestinal: soft, active bowel sounds, non-tender, non-distended  Extremities: Warm, dry; no clubbing or cyanosis, (+) 4+ pitting edema to BLE predominantly from feet up to the knees but also mid thigh edema noted though not as severe. No tenderness. (+) L elbow w/ soft bulbous mass without any tenderness to palpation  Lymph nodes: (+) large palpable L supraclavicular LAD, (+) BLE axillary and inguinal LAD  Vascular: 2+ radial, DP pulses bilaterally  Neurological: AAOx3; no focal deficits    MEDICATIONS:  MEDICATIONS  (STANDING):  allopurinol 300 milliGRAM(s) Oral daily  heparin  Infusion 1900 Unit(s)/Hr (19 mL/Hr) IV Continuous <Continuous>  heparin  Infusion 1900 Unit(s)/Hr (19 mL/Hr) IV Continuous <Continuous>  melatonin 5 milliGRAM(s) Oral at bedtime  sodium chloride 0.9%. 1000 milliLiter(s) (125 mL/Hr) IV Continuous <Continuous>    MEDICATIONS  (PRN):  diphenhydrAMINE   Capsule 50 milliGRAM(s) Oral every 4 hours PRN Rash and/or Itching      ALLERGIES:  Allergies    No Known Allergies    Intolerances        LABS:                        9.9    5.3   )-----------( 195      ( 20 Aug 2018 06:42 )             29.6     08-20    137  |  102  |  51<H>  ----------------------------<  102<H>  5.0   |  20<L>  |  3.14<H>    Ca    8.1<L>      20 Aug 2018 06:42  Phos  4.6     08-20  Mg     2.5     08-20    TPro  6.8  /  Alb  3.4  /  TBili  0.2  /  DBili  x   /  AST  23  /  ALT  15  /  AlkPhos  50  08-20    PT/INR - ( 20 Aug 2018 06:42 )   PT: 10.9 sec;   INR: 0.98          PTT - ( 20 Aug 2018 06:42 )  PTT:43.6 sec  Urinalysis Basic - ( 19 Aug 2018 09:52 )    Color: Yellow / Appearance: Clear / SG: <=1.005 / pH: x  Gluc: x / Ketone: NEGATIVE  / Bili: Negative / Urobili: 0.2 E.U./dL   Blood: x / Protein: Trace mg/dL / Nitrite: NEGATIVE   Leuk Esterase: NEGATIVE / RBC: 5-10 /HPF / WBC < 5 /HPF   Sq Epi: x / Non Sq Epi: 0-5 /HPF / Bacteria: Present /HPF      CAPILLARY BLOOD GLUCOSE      POCT Blood Glucose.: 101 mg/dL (20 Aug 2018 10:12)      RADIOLOGY & ADDITIONAL TESTS: Reviewed. Hematology Oncology Progress Note (Dr. Jung)    SUBJECTIVE / INTERVAL HPI: Patient seen and examined at bedside.     Patient denies any new complaints at this time. He reports persistent swelling to the BLE but has been able to get out of bed. He reports he does not want nephrostomy tubes as he feels it is too extreme; he feels as though his condition will improve with diuretics. He is urinating on his own and is also refusing Sher placement to decompress his bladder. He currently denies any fatigue, fevers, chills, nausea, abdominal pain, chest pain, dyspnea.       VITAL SIGNS:  Vital Signs Last 24 Hrs  T(C): 36.7 (20 Aug 2018 05:23), Max: 36.8 (19 Aug 2018 18:54)  T(F): 98 (20 Aug 2018 05:23), Max: 98.3 (19 Aug 2018 18:54)  HR: 72 (20 Aug 2018 05:23) (66 - 78)  BP: 117/79 (20 Aug 2018 05:23) (117/79 - 145/91)  BP(mean): --  RR: 18 (20 Aug 2018 05:23) (16 - 20)  SpO2: 98% (20 Aug 2018 05:23) (98% - 100%)    PHYSICAL EXAM:    General: well appearing, sitting in chair, and in no acute distress  Skin: (+) venous stasis changes to BLE  HEENT: normocephalic, atraumatic, PERRL, anicteric sclera; no conjunctival pallor, mucus membranes moist  Neck: supple, no JVD  Cardiovascular: +S1/S2, regular rate and rhythm; no murmurs, no rub, no gallop  Respiratory: clear to auscultation bilaterally, decreased breath sounds B/l. no rhonchi, no wheeze, no rales  Gastrointestinal: soft, active bowel sounds, non-tender, non-distended  Extremities: Warm, dry; no clubbing or cyanosis, (+) 4+ pitting edema to BLE predominantly from feet up to the knees but also mid thigh edema noted though not as severe. No tenderness. (+) L elbow w/ soft bulbous mass without any tenderness to palpation  Lymph nodes: (+) large palpable L supraclavicular LAD, (+) BLE axillary and inguinal LAD  Vascular: 2+ radial, DP pulses bilaterally  Neurological: AAOx3; no focal deficits    MEDICATIONS:  MEDICATIONS  (STANDING):  allopurinol 300 milliGRAM(s) Oral daily  heparin  Infusion 1900 Unit(s)/Hr (19 mL/Hr) IV Continuous <Continuous>  heparin  Infusion 1900 Unit(s)/Hr (19 mL/Hr) IV Continuous <Continuous>  melatonin 5 milliGRAM(s) Oral at bedtime  sodium chloride 0.9%. 1000 milliLiter(s) (125 mL/Hr) IV Continuous <Continuous>    MEDICATIONS  (PRN):  diphenhydrAMINE   Capsule 50 milliGRAM(s) Oral every 4 hours PRN Rash and/or Itching      ALLERGIES:  Allergies    No Known Allergies    Intolerances        LABS:                        9.9    5.3   )-----------( 195      ( 20 Aug 2018 06:42 )             29.6     08-20    137  |  102  |  51<H>  ----------------------------<  102<H>  5.0   |  20<L>  |  3.14<H>    Ca    8.1<L>      20 Aug 2018 06:42  Phos  4.6     08-20  Mg     2.5     08-20    TPro  6.8  /  Alb  3.4  /  TBili  0.2  /  DBili  x   /  AST  23  /  ALT  15  /  AlkPhos  50  08-20    PT/INR - ( 20 Aug 2018 06:42 )   PT: 10.9 sec;   INR: 0.98          PTT - ( 20 Aug 2018 06:42 )  PTT:43.6 sec  Urinalysis Basic - ( 19 Aug 2018 09:52 )    Color: Yellow / Appearance: Clear / SG: <=1.005 / pH: x  Gluc: x / Ketone: NEGATIVE  / Bili: Negative / Urobili: 0.2 E.U./dL   Blood: x / Protein: Trace mg/dL / Nitrite: NEGATIVE   Leuk Esterase: NEGATIVE / RBC: 5-10 /HPF / WBC < 5 /HPF   Sq Epi: x / Non Sq Epi: 0-5 /HPF / Bacteria: Present /HPF      CAPILLARY BLOOD GLUCOSE      POCT Blood Glucose.: 101 mg/dL (20 Aug 2018 10:12)      RADIOLOGY & ADDITIONAL TESTS: Reviewed.
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
## 2018-08-20 NOTE — PROGRESS NOTE ADULT - SUBJECTIVE AND OBJECTIVE BOX
SUBJECTIVE: Pt seen and examined at bedside. No overnight events.     Vital Signs Last 24 Hrs  T(C): 36.4 (20 Aug 2018 11:42), Max: 36.8 (19 Aug 2018 18:54)  T(F): 97.6 (20 Aug 2018 11:42), Max: 98.3 (19 Aug 2018 18:54)  HR: 67 (20 Aug 2018 11:42) (66 - 78)  BP: 133/82 (20 Aug 2018 11:42) (117/79 - 145/91)  BP(mean): --  RR: 18 (20 Aug 2018 11:42) (16 - 20)  SpO2: 98% (20 Aug 2018 11:42) (98% - 100%)    PHYSICAL EXAM    Gen: NAD  Abd: Soft, NT/ND, no cvat  : normal phallus, voids  Ext: b/l LE edema    I&O's Detail    19 Aug 2018 07:01  -  20 Aug 2018 07:00  --------------------------------------------------------  IN:    heparin Infusion: 90 mL    heparin Infusion: 85 mL    sodium chloride 0.9%.: 1500 mL  Total IN: 1675 mL    OUT:    Voided: 1050 mL  Total OUT: 1050 mL    Total NET: 625 mL      20 Aug 2018 07:01  -  20 Aug 2018 11:46  --------------------------------------------------------  IN:    heparin Infusion: 55 mL    heparin Infusion: 19 mL    sodium chloride 0.9%.: 500 mL  Total IN: 574 mL    OUT:    Voided: 400 mL  Total OUT: 400 mL    Total NET: 174 mL          LABS:                        9.9    5.3   )-----------( 195      ( 20 Aug 2018 06:42 )             29.6     08-20    137  |  102  |  51<H>  ----------------------------<  102<H>  5.0   |  20<L>  |  3.14<H>    Ca    8.1<L>      20 Aug 2018 06:42  Phos  4.6     08-20  Mg     2.5     08-20    TPro  6.8  /  Alb  3.4  /  TBili  0.2  /  DBili  x   /  AST  23  /  ALT  15  /  AlkPhos  50  08-20    PT/INR - ( 20 Aug 2018 06:42 )   PT: 10.9 sec;   INR: 0.98          PTT - ( 20 Aug 2018 06:42 )  PTT:43.6 sec  Urinalysis Basic - ( 19 Aug 2018 09:52 )    Color: Yellow / Appearance: Clear / SG: <=1.005 / pH: x  Gluc: x / Ketone: NEGATIVE  / Bili: Negative / Urobili: 0.2 E.U./dL   Blood: x / Protein: Trace mg/dL / Nitrite: NEGATIVE   Leuk Esterase: NEGATIVE / RBC: 5-10 /HPF / WBC < 5 /HPF   Sq Epi: x / Non Sq Epi: 0-5 /HPF / Bacteria: Present /HPF        MEDICATIONS  (STANDING):  allopurinol 300 milliGRAM(s) Oral daily  heparin  Infusion 1900 Unit(s)/Hr (19 mL/Hr) IV Continuous <Continuous>  melatonin 5 milliGRAM(s) Oral at bedtime  sodium chloride 0.9%. 1000 milliLiter(s) (125 mL/Hr) IV Continuous <Continuous>    MEDICATIONS  (PRN):  diphenhydrAMINE   Capsule 50 milliGRAM(s) Oral every 4 hours PRN Rash and/or Itching      RADIOLOGY & ADDITIONAL STUDIES:  INTERPRETATION:  CT of the CHEST, ABDOMEN and PELVIS without intravenous   contrast dated 8/19/2018 10:01 AM    INDICATION: Mass in the left supraclavicular area. Lower extremity edema.   Per team, patient with acute renal failure and can't get IV contrast.   Family history of lymphoma. Patient reportedly denies abdominal pain.    TECHNIQUE: CT of the chest, abdomen and pelvis was performed using oral   contrast material. Intravenous contrast was not used as requested.    Axial, sagittal and coronal images were produced and reviewed.    PRIOR STUDIES: None.    FINDINGS:    There is extensive lymphadenopathy in the chest, abdomen and pelvis.   There are multiple supraclavicular lymph nodes, the largest measuring 2.2   cm in the left supraclavicular region (series 3, image 1). There is   marked left axillary lymphadenopathy largest lymph node measuring 2.1 cm   in short axis.       There is extensive periaortic lymphadenopathy in the chest with possible   mass effect on the esophagus. Enlarged lymph nodes in the right   para-aortic region measuring up to 3.7 cm as well as azygos esophageal   region measuring 3.4 cm and right retrocrural measuring 2.8 cm.   Evaluation for mediastinal lymphadenopathy is limited without IV   contrast. There are multiple subcentimeter mediastinal lymph nodes   identified.  The heart is normal in size.  No pericardial effusion is   seen. Evaluation of the vasculature is limited without IV contrast.   Within that limitation, no gross vascular abnormalities are noted.     Clustered nodular opacities are seen in the left upper lobe, apical   posterior segment of the largest measuring 8 mm (series 5, image 53).   These may be postinflammatory and/or infectious in etiology. There is   some minimal associated pleural parenchymal scarring.    There are small bilateral pleural effusions. Mild compressive atelectasis   involving the posterior basal segment of the right lower lobe.      There is extensive para-aortic lymphadenopathy in the abdomen and pelvis   with a large area of confluent lymphadenopathy in the retroperitoneum,   measuring 10.8 x 5.9 cm. There is a 1.9 x 2.1 cm nodule/lymph node   adjacent to the left kidney.    There is marked left and mild right hydronephrosis with perinephric and   mesenteric fat stranding and marked bladder wall thickening with   perivesical fat plane stranding. There is a 1.4 cm cyst in lower pole the   right kidney.    There are no focal hepatic abnormalities. The gallbladder is   underdistended. The pancreas is normal in appearance.  No splenic   abnormalities are seen. There is a 0.8 x 0.6 cm calcified lesion anterior   to the spleen.    There is a 3.5 x 3.2 cm right adrenal nodule. The left adrenal gland is   unremarkable.     The small and large bowel are normal in appearance. There is mild   calcified atheromatous plaque of the aortoiliac system.  No abdominal   aortic aneurysm is seen within the limitations of this noncontrast study.     There is bulky lymphadenopathy extending into the internal and external   inguinal lymph node groups more pronounced along the external inguinal   lymph node chain into the right groin. Multiple bilateral enlarged   inguinal lymph nodes left greater than right.     Evaluation of the osseous structures demonstrates mild-moderate   degenerative changes.    IMPRESSION:    1.  Extensive lymphadenopathy most pronounced in the left supraclavicular   region, left axilla, posterior mediastinal and retroperitoneum with bulky   extensive periaortic and interaortocaval lymphadenopathy concerning for   non-Hodgkin's lymphoma.   2.  Marked left and mild right hydronephrosis likely secondary to mass   effect from the enlarged lymph nodes.   3.  Perinephric and mesenteric fat stranding as well as marked bladder   wall thickening and perivesical fat plane stranding. Correlate clinically   for cystitis and pyelonephritis.  4.  Clustered grouping of nodular opacities seen within the apical   posterior segment of the left upper lobe with some minimal adjacent   pleural parenchymal scarring. Abbreviated differential includes   postinflammatory, infectious and/or neoplastic. The largest nodule   measuring 8 mm. Short interval thoracic CT surveillance to confirm   stability.      The results of this examination were verbally communicated with read back   to the physician, Janice Petersen, on 8/19/2018 at 11:47 AM.              "Thank you for the opportunity to participate in the care of this   patient."    KEYLA MANZANO M.D., RADIOLOGY RESIDENT  This document has been electronically signed.  MITCHEL SEQUEIRA M.D., ATTENDING RADIOLOGIST  This document has been electronically signed. Aug 19 2018  3:36PM            ASSESSMENT AND PLAN
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
## 2018-08-20 NOTE — PROGRESS NOTE ADULT - SUBJECTIVE AND OBJECTIVE BOX
Hematology/Oncology Procedure Note    Bone Marrow Aspiration/Biopsy    Indication: lymphoma	    Bone marrow aspiration and biopsy procedure description, risks, and benefits were discussed in detail with the patient.  All questions were answered.  Informed consent was obtained and time-out performed.      The area of the RIGHT posterior iliac crest was prepared with Chlorhexidine and lidocaine 2% was injected for local anesthesia.    Bone marrow aspiration was performed using sterile technique.  Specimens were obtained for microscopic evaluation/cytogenetics/flow cytometry/culture.    Bone marrow biopsy was also performed and specimen collected for pathologic evaluation.    The procedure was well tolerated and no local bleeding or other complications were observed.  Pressure was applied to the procedure site and a wound dressing was placed.  The patient and nursing staff were advised that the patient is to lie flat for 30 minutes.  Tylenol may be used if no contraindications for pain at the procedure site.

## 2018-08-20 NOTE — CHART NOTE - NSCHARTNOTEFT_GEN_A_CORE
PGY-3 Event Note:    Pt refusing maddox in the setting of b/l hydronephrosis and elevated Cr.  Explained risks and benefits of maddox, pt verbalizes understanding, however, continues to refuse. Pt continuing to urinate.

## 2018-08-20 NOTE — PROGRESS NOTE ADULT - SUBJECTIVE AND OBJECTIVE BOX
OVERNIGHT EVENTS:    SUBJECTIVE:    Vital Signs Last 12 Hrs  T(F): 98 (08-20-18 @ 05:23), Max: 98 (08-20-18 @ 05:23)  HR: 72 (08-20-18 @ 05:23) (72 - 72)  BP: 117/79 (08-20-18 @ 05:23) (117/79 - 117/79)  BP(mean): --  RR: 18 (08-20-18 @ 05:23) (18 - 18)  SpO2: 98% (08-20-18 @ 05:23) (98% - 98%)  I&O's Summary    19 Aug 2018 07:01  -  20 Aug 2018 07:00  --------------------------------------------------------  IN: 1675 mL / OUT: 1050 mL / NET: 625 mL    20 Aug 2018 07:01  -  20 Aug 2018 09:20  --------------------------------------------------------  IN: 430 mL / OUT: 400 mL / NET: 30 mL      PHYSICAL EXAM:  Constitutional: WDWN resting comfortably in bed; NAD  HEENT: NC/AT, PERRL, EOMI, anicteric sclera, no nasal discharge; uvula midline, no oropharyngeal erythema or exudates; MMM  Neck: supple; no JVD or thyromegaly. Palpable L-side matted lymph nodes  Respiratory: L lung basilar crackles, R lung base decreased breath sounds; no W/R/R, no retractions  Cardiac: +S1/S2; RRR; no M/R/G;  Gastrointestinal: soft, distended, NT; no rebound or guarding; no suprapubic tenderness.  Inguinal: B/l inguinal nontender lymphadenopathy  Axillary: Lymphadenopathy.   Extremities: WWP, no clubbing or cyanosis; +4 pitting edema b/l LE upto mid thigh.   Musculoskeletal: NROM UE; no joint swelling, tenderness or erythema; LE decreased ROM, limited by weight and edema,   Neurologic: AAOx3; CNII-XII grossly intact; no focal deficits  Psych: anxious affect        LABS:                        9.9    5.3   )-----------( 195      ( 20 Aug 2018 06:42 )             29.6     08-20    137  |  102  |  51<H>  ----------------------------<  102<H>  5.0   |  20<L>  |  3.14<H>    Ca    8.1<L>      20 Aug 2018 06:42  Phos  4.6     08-20  Mg     2.5     08-20    TPro  6.8  /  Alb  3.4  /  TBili  0.2  /  DBili  x   /  AST  23  /  ALT  15  /  AlkPhos  50  08-20    PT/INR - ( 20 Aug 2018 06:42 )   PT: 10.9 sec;   INR: 0.98          PTT - ( 20 Aug 2018 06:42 )  PTT:43.6 sec  Urinalysis Basic - ( 19 Aug 2018 09:52 )    Color: Yellow / Appearance: Clear / SG: <=1.005 / pH: x  Gluc: x / Ketone: NEGATIVE  / Bili: Negative / Urobili: 0.2 E.U./dL   Blood: x / Protein: Trace mg/dL / Nitrite: NEGATIVE   Leuk Esterase: NEGATIVE / RBC: 5-10 /HPF / WBC < 5 /HPF   Sq Epi: x / Non Sq Epi: 0-5 /HPF / Bacteria: Present /HPF      RADIOLOGY & ADDITIONAL TESTS:    MEDICATIONS  (STANDING):  allopurinol 300 milliGRAM(s) Oral daily  heparin  Infusion 1900 Unit(s)/Hr (19 mL/Hr) IV Continuous <Continuous>  melatonin 5 milliGRAM(s) Oral at bedtime  sodium chloride 0.9%. 1000 milliLiter(s) (125 mL/Hr) IV Continuous <Continuous>    MEDICATIONS  (PRN):  diphenhydrAMINE   Capsule 50 milliGRAM(s) Oral every 4 hours PRN Rash and/or Itching OVERNIGHT EVENTS: NETO    SUBJECTIVE: Pt seen and examined at bedside. Had trouble sleeping and was given benadryl so endorses fatigue. Otherwise feels well. Does not complain of any fevers, headache, chest pain, sob, abdominal pain, leg pain. Pt is distressed about conversation with urologist about potential nephrostomy, but eventually agreed to urologic stent placements, to be done later today.     Vital Signs Last 12 Hrs  T(F): 98 (08-20-18 @ 05:23), Max: 98 (08-20-18 @ 05:23)  HR: 72 (08-20-18 @ 05:23) (72 - 72)  BP: 117/79 (08-20-18 @ 05:23) (117/79 - 117/79)  BP(mean): --  RR: 18 (08-20-18 @ 05:23) (18 - 18)  SpO2: 98% (08-20-18 @ 05:23) (98% - 98%)  I&O's Summary    19 Aug 2018 07:01  -  20 Aug 2018 07:00  --------------------------------------------------------  IN: 1675 mL / OUT: 1050 mL / NET: 625 mL    20 Aug 2018 07:01  -  20 Aug 2018 09:20  --------------------------------------------------------  IN: 430 mL / OUT: 400 mL / NET: 30 mL      PHYSICAL EXAM:  Constitutional: WDWN resting comfortably in bed; NAD  HEENT: NC/AT, PERRL, EOMI, anicteric sclera, no nasal discharge; uvula midline, no oropharyngeal erythema or exudates; MMM  Neck: supple; no JVD or thyromegaly. Palpable L-side matted lymph nodes  Respiratory: L lung basilar crackles, R lung base decreased breath sounds; no W/R/R, no retractions  Cardiac: +S1/S2; RRR; no M/R/G;  Gastrointestinal: soft, distended, NT; no rebound or guarding; no suprapubic tenderness.  Inguinal: B/l inguinal nontender lymphadenopathy  Axillary: Lymphadenopathy.   Extremities: WWP, no clubbing or cyanosis; +4 pitting edema b/l LE upto mid thigh.   Musculoskeletal: NROM UE; no joint swelling, tenderness or erythema; LE decreased ROM, limited by weight and edema,   Neurologic: AAOx3; CNII-XII grossly intact; no focal deficits  Psych: anxious affect    LABS:                        9.9    5.3   )-----------( 195      ( 20 Aug 2018 06:42 )             29.6     08-20    137  |  102  |  51<H>  ----------------------------<  102<H>  5.0   |  20<L>  |  3.14<H>    Ca    8.1<L>      20 Aug 2018 06:42  Phos  4.6     08-20  Mg     2.5     08-20    TPro  6.8  /  Alb  3.4  /  TBili  0.2  /  DBili  x   /  AST  23  /  ALT  15  /  AlkPhos  50  08-20    PT/INR - ( 20 Aug 2018 06:42 )   PT: 10.9 sec;   INR: 0.98          PTT - ( 20 Aug 2018 06:42 )  PTT:43.6 sec  Urinalysis Basic - ( 19 Aug 2018 09:52 )    Color: Yellow / Appearance: Clear / SG: <=1.005 / pH: x  Gluc: x / Ketone: NEGATIVE  / Bili: Negative / Urobili: 0.2 E.U./dL   Blood: x / Protein: Trace mg/dL / Nitrite: NEGATIVE   Leuk Esterase: NEGATIVE / RBC: 5-10 /HPF / WBC < 5 /HPF   Sq Epi: x / Non Sq Epi: 0-5 /HPF / Bacteria: Present /HPF      RADIOLOGY & ADDITIONAL TESTS:    MEDICATIONS  (STANDING):  allopurinol 300 milliGRAM(s) Oral daily  heparin  Infusion 1900 Unit(s)/Hr (19 mL/Hr) IV Continuous <Continuous>  melatonin 5 milliGRAM(s) Oral at bedtime  sodium chloride 0.9%. 1000 milliLiter(s) (125 mL/Hr) IV Continuous <Continuous>    MEDICATIONS  (PRN):  diphenhydrAMINE   Capsule 50 milliGRAM(s) Oral every 4 hours PRN Rash and/or Itching

## 2018-08-20 NOTE — PROGRESS NOTE ADULT - SUBJECTIVE AND OBJECTIVE BOX
Postop:  Pt denies chest pain, SOB, NV or severe abdominal pain               Vital Signs Last 24 Hrs  T(C): 36.4 (20 Aug 2018 15:48), Max: 36.8 (19 Aug 2018 18:54)  T(F): 97.6 (20 Aug 2018 15:48), Max: 98.3 (19 Aug 2018 18:54)  HR: 72 (20 Aug 2018 16:28) (67 - 82)  BP: 131/84 (20 Aug 2018 16:28) (117/79 - 145/91)  BP(mean): 108 (20 Aug 2018 16:28) (98 - 108)  RR: 17 (20 Aug 2018 16:28) (11 - 20)  SpO2: 100% (20 Aug 2018 16:28) (98% - 100%)    I&O's Summary    19 Aug 2018 07:01  -  20 Aug 2018 07:00  --------------------------------------------------------  IN: 1675 mL / OUT: 1050 mL / NET: 625 mL    20 Aug 2018 07:01  -  20 Aug 2018 16:42  --------------------------------------------------------  IN: 574 mL / OUT: 400 mL / NET: 174 mL        Gen: NAD    Abd: NTND    : maddox draining clear                           9.9    5.3   )-----------( 195      ( 20 Aug 2018 06:42 )             29.6     08-20    137  |  102  |  51<H>  ----------------------------<  102<H>  5.0   |  20<L>  |  3.14<H>    Ca    8.1<L>      20 Aug 2018 06:42  Phos  4.6     08-20  Mg     2.5     08-20    TPro  6.8  /  Alb  3.4  /  TBili  0.2  /  DBili  x   /  AST  23  /  ALT  15  /  AlkPhos  50  08-20    cultures    A/P: 58M hx severe b/l hydro s/p b/l ureteral stents 8/20  1- Maddox-monitor uop  2- Trend cr  3- Monitor post-obstructive diuresis  4- Care per primary team

## 2018-08-20 NOTE — BRIEF OPERATIVE NOTE - PROCEDURE
<<-----Click on this checkbox to enter Procedure Cystoscopy with bilateral retrograde pyelography with insertion of stents  08/20/2018    Active  ERICA

## 2018-08-21 LAB
ANION GAP SERPL CALC-SCNC: 14 MMOL/L — SIGNIFICANT CHANGE UP (ref 5–17)
APTT BLD: 60.7 SEC — HIGH (ref 27.5–37.4)
APTT BLD: 77.3 SEC — HIGH (ref 27.5–37.4)
BUN SERPL-MCNC: 51 MG/DL — HIGH (ref 7–23)
CALCIUM SERPL-MCNC: 8.6 MG/DL — SIGNIFICANT CHANGE UP (ref 8.4–10.5)
CHLORIDE SERPL-SCNC: 100 MMOL/L — SIGNIFICANT CHANGE UP (ref 96–108)
CO2 SERPL-SCNC: 19 MMOL/L — LOW (ref 22–31)
CREAT ?TM UR-MCNC: 85 MG/DL — SIGNIFICANT CHANGE UP
CREAT SERPL-MCNC: 2.92 MG/DL — HIGH (ref 0.5–1.3)
GLUCOSE BLDC GLUCOMTR-MCNC: 123 MG/DL — HIGH (ref 70–99)
GLUCOSE SERPL-MCNC: 121 MG/DL — HIGH (ref 70–99)
HAV IGM SER-ACNC: SIGNIFICANT CHANGE UP
HBV CORE IGM SER-ACNC: SIGNIFICANT CHANGE UP
HBV SURFACE AG SER-ACNC: SIGNIFICANT CHANGE UP
HCT VFR BLD CALC: 29.1 % — LOW (ref 39–50)
HCV AB S/CO SERPL IA: 13.14 S/CO — SIGNIFICANT CHANGE UP
HCV AB SERPL-IMP: REACTIVE
HGB BLD-MCNC: 9.6 G/DL — LOW (ref 13–17)
MAGNESIUM SERPL-MCNC: 2.2 MG/DL — SIGNIFICANT CHANGE UP (ref 1.6–2.6)
MCHC RBC-ENTMCNC: 31.9 PG — SIGNIFICANT CHANGE UP (ref 27–34)
MCHC RBC-ENTMCNC: 33 G/DL — SIGNIFICANT CHANGE UP (ref 32–36)
MCV RBC AUTO: 96.7 FL — SIGNIFICANT CHANGE UP (ref 80–100)
OSMOLALITY UR: 374 MOSMOL/KG — SIGNIFICANT CHANGE UP (ref 100–650)
PLATELET # BLD AUTO: 234 K/UL — SIGNIFICANT CHANGE UP (ref 150–400)
POTASSIUM SERPL-MCNC: 5 MMOL/L — SIGNIFICANT CHANGE UP (ref 3.5–5.3)
POTASSIUM SERPL-SCNC: 5 MMOL/L — SIGNIFICANT CHANGE UP (ref 3.5–5.3)
RBC # BLD: 3.01 M/UL — LOW (ref 4.2–5.8)
RBC # FLD: 11.3 % — SIGNIFICANT CHANGE UP (ref 10.3–16.9)
SODIUM SERPL-SCNC: 133 MMOL/L — LOW (ref 135–145)
SODIUM UR-SCNC: 53 MMOL/L — SIGNIFICANT CHANGE UP
UUN UR-MCNC: 569 MG/DL — SIGNIFICANT CHANGE UP
WBC # BLD: 7.8 K/UL — SIGNIFICANT CHANGE UP (ref 3.8–10.5)
WBC # FLD AUTO: 7.8 K/UL — SIGNIFICANT CHANGE UP (ref 3.8–10.5)

## 2018-08-21 PROCEDURE — 93306 TTE W/DOPPLER COMPLETE: CPT | Mod: 26

## 2018-08-21 PROCEDURE — 78815 PET IMAGE W/CT SKULL-THIGH: CPT | Mod: 26

## 2018-08-21 PROCEDURE — 99233 SBSQ HOSP IP/OBS HIGH 50: CPT

## 2018-08-21 RX ORDER — LIDOCAINE HCL 20 MG/ML
10 VIAL (ML) INJECTION
Qty: 0 | Refills: 0 | Status: DISCONTINUED | OUTPATIENT
Start: 2018-08-21 | End: 2018-08-23

## 2018-08-21 RX ORDER — LIDOCAINE HCL 20 MG/ML
10 VIAL (ML) INJECTION ONCE
Qty: 0 | Refills: 0 | Status: COMPLETED | OUTPATIENT
Start: 2018-08-21 | End: 2018-08-21

## 2018-08-21 RX ORDER — ACETAMINOPHEN 500 MG
650 TABLET ORAL EVERY 6 HOURS
Qty: 0 | Refills: 0 | Status: DISCONTINUED | OUTPATIENT
Start: 2018-08-21 | End: 2018-08-27

## 2018-08-21 RX ORDER — PHENAZOPYRIDINE HCL 100 MG
100 TABLET ORAL EVERY 8 HOURS
Qty: 0 | Refills: 0 | Status: DISCONTINUED | OUTPATIENT
Start: 2018-08-21 | End: 2018-08-22

## 2018-08-21 RX ADMIN — Medication 50 MILLIGRAM(S): at 23:00

## 2018-08-21 RX ADMIN — Medication 650 MILLIGRAM(S): at 21:00

## 2018-08-21 RX ADMIN — SODIUM CHLORIDE 125 MILLILITER(S): 9 INJECTION INTRAMUSCULAR; INTRAVENOUS; SUBCUTANEOUS at 02:03

## 2018-08-21 RX ADMIN — SODIUM CHLORIDE 125 MILLILITER(S): 9 INJECTION INTRAMUSCULAR; INTRAVENOUS; SUBCUTANEOUS at 11:41

## 2018-08-21 RX ADMIN — HEPARIN SODIUM 20 UNIT(S)/HR: 5000 INJECTION INTRAVENOUS; SUBCUTANEOUS at 05:24

## 2018-08-21 RX ADMIN — Medication 300 MILLIGRAM(S): at 11:42

## 2018-08-21 RX ADMIN — Medication 100 MILLIGRAM(S): at 14:12

## 2018-08-21 RX ADMIN — Medication 10 MILLILITER(S): at 13:04

## 2018-08-21 RX ADMIN — Medication 650 MILLIGRAM(S): at 21:46

## 2018-08-21 NOTE — PROGRESS NOTE ADULT - SUBJECTIVE AND OBJECTIVE BOX
ID: [id number] presenting with bilateral hydronephrosis secondary to pelvic lymphadenopathy POD#1 s/p bilateral ureteral stent placement    24 Hour Events: NETO    SUBJECTIVE: Patient reports some burning sensation and discomfort from catheter.  Denies bladder spasms.      OBJECTIVE:    Vital Signs:  Vital Signs Last 24 Hrs  T(C): 36.4 (21 Aug 2018 14:08), Max: 36.9 (20 Aug 2018 20:30)  T(F): 97.5 (21 Aug 2018 14:08), Max: 98.4 (20 Aug 2018 20:30)  HR: 73 (21 Aug 2018 14:08) (61 - 82)  BP: 125/74 (21 Aug 2018 14:08) (115/69 - 140/80)  BP(mean): 102 (20 Aug 2018 17:13) (98 - 108)  RR: 18 (21 Aug 2018 14:08) (10 - 18)  SpO2: 96% (21 Aug 2018 14:08) (93% - 100%)    Physical Exam:  General: NAD  Pulmonary: Nonlabored breathing, no respiratory distress  Cardiovascular: No heaves/thrills  Abdominal: Soft, NT/ND  Extremities: WWP, no clubbing/cyanosis/edema  Neuro: AAO x3, no focal deficits  : Sher in place draining fruit punch colored urine    Ins and Outs:  I&O's Summary    20 Aug 2018 07:01  -  21 Aug 2018 07:00  --------------------------------------------------------  IN: 1876 mL / OUT: 2500 mL / NET: -624 mL    21 Aug 2018 07:01  -  21 Aug 2018 15:45  --------------------------------------------------------  IN: 0 mL / OUT: 450 mL / NET: -450 mL        Labs:                        9.6    7.8   )-----------( 234      ( 21 Aug 2018 06:07 )             29.1     08-21    133<L>  |  100  |  51<H>  ----------------------------<  121<H>  5.0   |  19<L>  |  2.92<H>    Ca    8.6      21 Aug 2018 06:07  Phos  4.6     08-20  Mg     2.2     08-21    TPro  6.8  /  Alb  3.4  /  TBili  0.2  /  DBili  x   /  AST  23  /  ALT  15  /  AlkPhos  50  08-20    PT/INR - ( 20 Aug 2018 06:42 )   PT: 10.9 sec;   INR: 0.98          PTT - ( 21 Aug 2018 12:16 )  PTT:60.7 sec    CAPILLARY BLOOD GLUCOSE      POCT Blood Glucose.: 123 mg/dL (21 Aug 2018 07:30)    LIVER FUNCTIONS - ( 20 Aug 2018 06:42 )  Alb: 3.4 g/dL / Pro: 6.8 g/dL / ALK PHOS: 50 U/L / ALT: 15 U/L / AST: 23 U/L / GGT: x             Radiology & Additional Studies:

## 2018-08-21 NOTE — PROGRESS NOTE ADULT - ASSESSMENT
A/P: 58M presenting with bilateral hydronephrosis secondary to pelvic lymphadenopathy POD#1 s/p bilateral ureteral stent placement  - Pyridium 100mg PO TID for burning sensation  - Remove Sher tomorrow if urine color remains the same or less red  - Follow-up with Dr. Valverde as an outpatient to discuss ureteral stent exchange in 3 months

## 2018-08-21 NOTE — PROGRESS NOTE ADULT - SUBJECTIVE AND OBJECTIVE BOX
Hematology Oncology Progress Note (Dr. Jung)    SUBJECTIVE / INTERVAL HPI: Patient seen and examined at bedside. Bone marrow biopsy was performed yesterday afternoon followed by bilateral stent placement by urology. Went for PET scan this am without return of results yet. Patient reports tolerating all procedures well. He is anxious to leave the hospital and is complaining of pain from the Maddox site. Otherwise no pain at bone marrow biopsy site. He is able to ambulate OOB and get to the chair without difficulties. Otherwise: (-) fever, (-) chills, (-) dizziness, (-) headache, (-) neck pain, (-) chest pain, (-) palpitations, (-)SOB, (-) nausea, (-) vomiting, (-) diarrhea, (-) abdominal pain, (-)weakness, (-) paresthesias.      VITAL SIGNS:  Vital Signs Last 24 Hrs  T(C): 36.4 (21 Aug 2018 06:09), Max: 36.9 (20 Aug 2018 20:30)  T(F): 97.5 (21 Aug 2018 06:09), Max: 98.4 (20 Aug 2018 20:30)  HR: 80 (21 Aug 2018 06:09) (61 - 82)  BP: 115/69 (21 Aug 2018 06:09) (115/69 - 140/80)  BP(mean): 102 (20 Aug 2018 17:13) (98 - 108)  RR: 16 (21 Aug 2018 06:09) (10 - 18)  SpO2: 93% (21 Aug 2018 06:09) (93% - 100%)    PHYSICAL EXAM:    General: well appearing, sitting in chair, and in no acute distress  Skin: (+) venous stasis changes to BLE  HEENT: normocephalic, atraumatic, PERRL, anicteric sclera; no conjunctival pallor, mucus membranes moist  Neck: supple, no JVD  Cardiovascular: +S1/S2, regular rate and rhythm; no murmurs, no rub, no gallop  Respiratory: clear to auscultation bilaterally, decreased breath sounds B/l. no rhonchi, no wheeze, no rales  Gastrointestinal: soft, active bowel sounds, non-tender, non-distended  : (+) maddox in place draining hematuria  Extremities: Warm, dry; no clubbing or cyanosis, (+) 4+ pitting edema to BLE predominantly from feet up to the knees but also mid thigh edema noted though not as severe. No tenderness. (+) L elbow w/ soft bulbous mass without any tenderness to palpation  Lymph nodes: (+) large palpable L supraclavicular LAD, (+) L axillary and b/l inguinal LAD  Vascular: 2+ radial, DP pulses bilaterally  Neurological: AAOx3; no focal deficits    MEDICATIONS:  MEDICATIONS  (STANDING):  allopurinol 300 milliGRAM(s) Oral daily  heparin  Infusion 2000 Unit(s)/Hr (20 mL/Hr) IV Continuous <Continuous>  lidocaine 2% Jelly 10 milliLiter(s) IntraUrethral once  melatonin 5 milliGRAM(s) Oral at bedtime  sodium chloride 0.9%. 1000 milliLiter(s) (125 mL/Hr) IV Continuous <Continuous>    MEDICATIONS  (PRN):  acetaminophen  IVPB. 1000 milliGRAM(s) IV Intermittent once PRN Moderate Pain (4 - 6)  diphenhydrAMINE   Capsule 50 milliGRAM(s) Oral every 4 hours PRN Rash and/or Itching  lidocaine 2% Gel 1 Application(s) Topical every 3 hours PRN pain  oxybutynin 5 milliGRAM(s) Oral every 8 hours PRN Bladder spasms      ALLERGIES:  Allergies    No Known Allergies    Intolerances        LABS:                        9.6    7.8   )-----------( 234      ( 21 Aug 2018 06:07 )             29.1     08-21    133<L>  |  100  |  51<H>  ----------------------------<  121<H>  5.0   |  19<L>  |  2.92<H>    Ca    8.6      21 Aug 2018 06:07  Phos  4.6     08-20  Mg     2.2     08-21    TPro  6.8  /  Alb  3.4  /  TBili  0.2  /  DBili  x   /  AST  23  /  ALT  15  /  AlkPhos  50  08-20    PT/INR - ( 20 Aug 2018 06:42 )   PT: 10.9 sec;   INR: 0.98          PTT - ( 21 Aug 2018 06:07 )  PTT:77.3 sec    CAPILLARY BLOOD GLUCOSE      POCT Blood Glucose.: 123 mg/dL (21 Aug 2018 07:30)      RADIOLOGY & ADDITIONAL TESTS: Reviewed.

## 2018-08-21 NOTE — PROGRESS NOTE ADULT - ASSESSMENT
59yoM with PMH HCV, EtOH abuse, BLE edema, admitted with DVT and concern for lymphoma on CT; General SUrgery consulted for lymph node biopsy    Pt pending PET scan  Should scan be performed today and read by radiology, could add patient on to OR schedule tomorrow  Appreciate care per primary team  Team4C to follow  Please call Team 4C with updates on PET scan to arrange OR scheduling. Patient will need to be pre-opped prior and have heparin gtt held on call to OR 59yoM with PMH HCV, EtOH abuse, BLE edema, admitted with DVT and concern for lymphoma on CT; General SUrgery consulted for lymph node biopsy    Pt pending PET scan  Should scan be performed today and read by radiology, will discuss timing of surgery.  Appreciate care per primary team  Team4C to follow  Please call Team 4C with updates on PET scan to arrange OR scheduling. Patient will need to be pre-opped prior and have heparin gtt held on call to OR
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
## 2018-08-21 NOTE — CHART NOTE - NSCHARTNOTEFT_GEN_A_CORE
PET performed and read, as follows  < from: NM PET/CT Onc FDG Skull to Thigh, Inital (08.21.18 @ 11:30) >    Findings: There is extensive FDG avid lymphadenopathy. There is   hypermetabolic left cervical, supraclavicular, axillary, and   para-aortic/retroperitoneal lymphadenopathy in the abdomen and pelvis.   There is also hypermetabolic lymphadenopathy extending along the internal   and external inguinal lymph node groups.    Increased FDG activity is also noted in the left posterior fifth rib,   short segments of multiple other ribs, right scapula, right L5 pedicle.    Irregular FDG activity is also seen throughout the right kidney in a   pattern suggesting infiltration of the renal parenchyma.    There is a 2.0 cm sialolith in the right submandibular gland.    The heart is normal in size.  No pericardial effusion is seen.     Small bilateral pleural effusions are seen, right greater than left with   compressive atelectasis of the right basal lung zone.     Images of the upper abdomen demonstrate no focal hepatic abnormalities.     No radiopaque stones are seen in the gallbladder.  The pancreas is normal   in appearance.  No splenic abnormalities are seen.    2.5 cm right adrenal nodule. Left adrenal gland is unremarkable. There   has been interval placement of bilateral ureteral stents with improvement   in bilateral hydronephrosis. Noabdominal aortic aneurysm is seen. No   lymphadenopathy is seen.     Evaluation of the bowel demonstrates no abnormalities. There is pelvic   ascites.    Images of the pelvis demonstrate normal prostate.    Impression: Extensive bulky hypermetabolic lymphadenopathy in the neck,   chest, abdomen and pelvis. Findings consistent with lymphoma. There is   also involvement of osseous structures and probably the right kidney.      < end of copied text >    >>>Given PET scan results, in the context of patient's history of trauma and reconstruction to the neck, would recommend ENT consult at this time to obtain biopsy of nodes in the area.  Discussed with chief and Dr. Plunkett. Team 4C available as needed
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
## 2018-08-21 NOTE — PROGRESS NOTE ADULT - ASSESSMENT
57 yo old male presenting with b/l lower extremity edema and b/l hydronephrosis likely 2/2 LAD 2/2 NHL.

## 2018-08-21 NOTE — PROGRESS NOTE ADULT - ASSESSMENT
59 yo M presenting with b/l lower ext edema and diffuse lymphadenopathy apparent on CT imaging, concerning for underlying lymphoproliferative disorder    # Lymphadenopathy  - CT w/ evidence diffuse LAD in left supraclavicular region, left axilla, posterior mediastinal and retroperitoneum with bulky extensive periaortic and interaortocaval lymphadenopathy concerning for lymphoproliferative disorder. elevated LDH and uric acid consistent with underlying highly proliferative malignancy, haptoglobin normal- no evidence of hemolysis. Peripheral smear shows atypical lymphocytes.  - Pending results for Whole Body PET/CT for staging.  - General surgery was consulted regarding excisional bx for pathologic diagnosis: planning for add on case  tomorrow if PET results return today  - evidence of TLS with renal failure, hyperuricemia- on allopurinol, IVFs, will give one dose of rasburicase 6 mg IV. check f/u uric acid in 4 hrs and 12 hrs after rasburicase administration  - Hep B: non reactive. Hep C: reactive (consistent w/ history)  - perform 2D Echocardiogram  - urology consult for obstructive nephropathy 2/2 to bulky RP LAD: now s/p B/l ureteral stents w/ maddox placement  - bone marrow bx performed 8/20: pending results  - Pt advised to stay in hospital and be admitted for initial work up especially in setting of ARF failure 2/2 to obstruction from bulky RP LAD. He was told that he may have permanent damage to his kidneys and may also die if he leaves hospital without receiving appropriate care.     # Tumor lysis syndrome  - 2/2 to underlying lymphoproliferative disorder, evidence of renal dysfunction likely multifactorial 2/2 to obstruction and hyperuricemia  - c/w IVFs, allopurinol 300 mg daily if CrCl >30. Given evidence of LDH 2x ULN, uric acid >8 and Cr >1.5, pt is a candidate for rasburicase therapy to lower uric acid levels. Will give rasburicase 6 mg IV x 1. f/u uric acid level 4 hrs and 12 hrs after administration. check potassium, calcium, phosphorus levels daily    # DVT  - LE dopplers show thrombosis of a left calf muscular vein, hold of on lovenox therapy due to renal failure and need for excisional bx for dx of lymphoma  - heparin gtt    # -normocytic anemia, peripheral smear reviewed  - etiology unclear, check iron studies, Vit B12 and folate levels along with retic count 57 yo M presenting with b/l lower ext edema and diffuse lymphadenopathy apparent on CT imaging, concerning for underlying lymphoproliferative disorder    # Lymphadenopathy  - CT w/ evidence diffuse LAD in left supraclavicular region, left axilla, posterior mediastinal and retroperitoneum with bulky extensive periaortic and interaortocaval lymphadenopathy concerning for lymphoproliferative disorder. elevated LDH and uric acid consistent with underlying highly proliferative malignancy, haptoglobin normal- no evidence of hemolysis. Peripheral smear shows atypical lymphocytes.  - Pending results for Whole Body PET/CT for staging.  - General surgery was consulted regarding excisional bx for pathologic diagnosis: planning for add on case  tomorrow if PET results return today  - Hep B: non reactive. Hep C: reactive (consistent w/ history)  - perform 2D Echocardiogram  - urology consult for obstructive nephropathy 2/2 to bulky RP LAD: now s/p B/l ureteral stents w/ maddox placement  - bone marrow bx performed 8/20: pending results  - Pt advised to stay in hospital and be admitted for initial work up especially in setting of ARF failure 2/2 to obstruction from bulky RP LAD. He was told that he may have permanent damage to his kidneys and may also die if he leaves hospital without receiving appropriate care.     # Tumor lysis syndrome  - 2/2 to underlying lymphoproliferative disorder, evidence of renal dysfunction likely multifactorial 2/2 to obstruction and hyperuricemia  - c/w IVFs, allopurinol 300 mg daily if CrCl >30. Given evidence of LDH 2x ULN, uric acid >8 and Cr >1.5, pt is a candidate for rasburicase therapy to lower uric acid levels. Will give rasburicase 6 mg IV x 1. f/u uric acid level 4 hrs and 12 hrs after administration. check potassium, calcium, phosphorus levels daily    # DVT  - LE dopplers show thrombosis of a left calf muscular vein, hold of on lovenox therapy due to renal failure and need for excisional bx for dx of lymphoma  - heparin gtt    # -normocytic anemia, peripheral smear reviewed  - etiology unclear, check iron studies, Vit B12 and folate levels along with retic count 59 yo M presenting with b/l lower ext edema and diffuse lymphadenopathy apparent on CT imaging, concerning for underlying lymphoproliferative disorder    # Lymphadenopathy  - CT w/ evidence diffuse LAD in left supraclavicular region, left axilla, posterior mediastinal and retroperitoneum with bulky extensive periaortic and interaortocaval lymphadenopathy concerning for lymphoproliferative disorder. elevated LDH and uric acid consistent with underlying highly proliferative malignancy, haptoglobin normal- no evidence of hemolysis. Peripheral smear shows atypical lymphocytes.  - Pending results for Whole Body PET/CT for staging.  - General surgery was consulted regarding excisional bx for pathologic diagnosis: planning for add on case  tomorrow if PET results return today  - Hep B: non reactive. Hep C: reactive (consistent w/ history)  - perform 2D Echocardiogram  - urology consult for obstructive nephropathy 2/2 to bulky RP LAD: now s/p B/l ureteral stents w/ maddox placement  - bone marrow bx performed 8/20: pending results    # DVT  - LE dopplers show thrombosis of a left calf muscular vein, hold of on lovenox therapy due to renal failure and need for excisional bx for dx of lymphoma  - heparin gtt    # -normocytic anemia, peripheral smear reviewed  - etiology unclear, iron studies, Vit B12 and folate levels adequate

## 2018-08-21 NOTE — PROGRESS NOTE ADULT - SUBJECTIVE AND OBJECTIVE BOX
SUBJECTIVE: Patient seen and examined bedside. No acute events overnight. Pt without complaints this AM    heparin  Infusion 2000 Unit(s)/Hr IV Continuous <Continuous>      Vital Signs Last 24 Hrs  T(C): 36.4 (21 Aug 2018 06:09), Max: 36.9 (20 Aug 2018 20:30)  T(F): 97.5 (21 Aug 2018 06:09), Max: 98.4 (20 Aug 2018 20:30)  HR: 80 (21 Aug 2018 06:09) (61 - 82)  BP: 115/69 (21 Aug 2018 06:09) (115/69 - 140/80)  BP(mean): 102 (20 Aug 2018 17:13) (98 - 108)  RR: 16 (21 Aug 2018 06:09) (10 - 18)  SpO2: 93% (21 Aug 2018 06:09) (93% - 100%)  I&O's Detail    20 Aug 2018 07:01  -  21 Aug 2018 07:00  --------------------------------------------------------  IN:    heparin Infusion: 55 mL    heparin Infusion: 19 mL    heparin Infusion: 177 mL    sodium chloride 0.9%.: 1625 mL  Total IN: 1876 mL    OUT:    Indwelling Catheter - Urethral: 1700 mL    Voided: 400 mL  Total OUT: 2100 mL    Total NET: -224 mL      Gen: NAD, resting comfortably in bed. Well developed, well groomed, appears stated age  Neuro: CNII-XII grossly intact. AAOx3. Follows commands  HEENT: PERRL, EOMI, MMM. Left supraclavicular lymphadenopathy  Pulm: CTAB, no respiratory distress, nonlabored breathing  C/V: RRR, no MRG  Abd: Soft, NT/ND. No tympany, no rebound, no guarding.  Extrem: WWP, +BLE edema, nontender. No cyanosis, pallor.  Psych: normal affect, responds appropriately to questions          LABS:                        9.6    7.8   )-----------( 234      ( 21 Aug 2018 06:07 )             29.1     08-21    133<L>  |  100  |  51<H>  ----------------------------<  121<H>  5.0   |  19<L>  |  2.92<H>    Ca    8.6      21 Aug 2018 06:07  Phos  4.6     08-20  Mg     2.2     08-21    TPro  6.8  /  Alb  3.4  /  TBili  0.2  /  DBili  x   /  AST  23  /  ALT  15  /  AlkPhos  50  08-20    PT/INR - ( 20 Aug 2018 06:42 )   PT: 10.9 sec;   INR: 0.98          PTT - ( 21 Aug 2018 06:07 )  PTT:77.3 sec      RADIOLOGY & ADDITIONAL STUDIES:

## 2018-08-22 DIAGNOSIS — N13.8 OTHER OBSTRUCTIVE AND REFLUX UROPATHY: ICD-10-CM

## 2018-08-22 DIAGNOSIS — R31.0 GROSS HEMATURIA: ICD-10-CM

## 2018-08-22 LAB
ANION GAP SERPL CALC-SCNC: 15 MMOL/L — SIGNIFICANT CHANGE UP (ref 5–17)
APTT BLD: 78.3 SEC — HIGH (ref 27.5–37.4)
BUN SERPL-MCNC: 54 MG/DL — HIGH (ref 7–23)
CALCIUM SERPL-MCNC: 8.4 MG/DL — SIGNIFICANT CHANGE UP (ref 8.4–10.5)
CHLORIDE SERPL-SCNC: 102 MMOL/L — SIGNIFICANT CHANGE UP (ref 96–108)
CO2 SERPL-SCNC: 19 MMOL/L — LOW (ref 22–31)
CREAT SERPL-MCNC: 2.84 MG/DL — HIGH (ref 0.5–1.3)
GLUCOSE BLDC GLUCOMTR-MCNC: 101 MG/DL — HIGH (ref 70–99)
GLUCOSE SERPL-MCNC: 98 MG/DL — SIGNIFICANT CHANGE UP (ref 70–99)
HCT VFR BLD CALC: 29.3 % — LOW (ref 39–50)
HEMATOPATHOLOGY REPORT: SIGNIFICANT CHANGE UP
HGB BLD-MCNC: 9.7 G/DL — LOW (ref 13–17)
INR BLD: 1.05 — SIGNIFICANT CHANGE UP (ref 0.88–1.16)
LDH SERPL L TO P-CCNC: 756 U/L — HIGH (ref 50–242)
MAGNESIUM SERPL-MCNC: 2.2 MG/DL — SIGNIFICANT CHANGE UP (ref 1.6–2.6)
MCHC RBC-ENTMCNC: 32.4 PG — SIGNIFICANT CHANGE UP (ref 27–34)
MCHC RBC-ENTMCNC: 33.1 G/DL — SIGNIFICANT CHANGE UP (ref 32–36)
MCV RBC AUTO: 98 FL — SIGNIFICANT CHANGE UP (ref 80–100)
PHOSPHATE SERPL-MCNC: 4.5 MG/DL — SIGNIFICANT CHANGE UP (ref 2.5–4.5)
PLATELET # BLD AUTO: 225 K/UL — SIGNIFICANT CHANGE UP (ref 150–400)
POTASSIUM SERPL-MCNC: 5 MMOL/L — SIGNIFICANT CHANGE UP (ref 3.5–5.3)
POTASSIUM SERPL-SCNC: 5 MMOL/L — SIGNIFICANT CHANGE UP (ref 3.5–5.3)
PROTHROM AB SERPL-ACNC: 11.7 SEC — SIGNIFICANT CHANGE UP (ref 9.8–12.7)
RBC # BLD: 2.99 M/UL — LOW (ref 4.2–5.8)
RBC # FLD: 11.8 % — SIGNIFICANT CHANGE UP (ref 10.3–16.9)
SODIUM SERPL-SCNC: 136 MMOL/L — SIGNIFICANT CHANGE UP (ref 135–145)
URATE SERPL-MCNC: 0.5 MG/DL — LOW (ref 3.4–8.8)
WBC # BLD: 8.2 K/UL — SIGNIFICANT CHANGE UP (ref 3.8–10.5)
WBC # FLD AUTO: 8.2 K/UL — SIGNIFICANT CHANGE UP (ref 3.8–10.5)

## 2018-08-22 PROCEDURE — 99233 SBSQ HOSP IP/OBS HIGH 50: CPT

## 2018-08-22 RX ORDER — QUETIAPINE FUMARATE 200 MG/1
25 TABLET, FILM COATED ORAL ONCE
Qty: 0 | Refills: 0 | Status: COMPLETED | OUTPATIENT
Start: 2018-08-22 | End: 2018-08-22

## 2018-08-22 RX ORDER — PHENAZOPYRIDINE HCL 100 MG
200 TABLET ORAL EVERY 8 HOURS
Qty: 0 | Refills: 0 | Status: DISCONTINUED | OUTPATIENT
Start: 2018-08-22 | End: 2018-08-22

## 2018-08-22 RX ORDER — OXYCODONE AND ACETAMINOPHEN 5; 325 MG/1; MG/1
1 TABLET ORAL ONCE
Qty: 0 | Refills: 0 | Status: DISCONTINUED | OUTPATIENT
Start: 2018-08-22 | End: 2018-08-22

## 2018-08-22 RX ORDER — SENNA PLUS 8.6 MG/1
2 TABLET ORAL AT BEDTIME
Qty: 0 | Refills: 0 | Status: DISCONTINUED | OUTPATIENT
Start: 2018-08-22 | End: 2018-08-27

## 2018-08-22 RX ADMIN — Medication 100 MILLIGRAM(S): at 07:30

## 2018-08-22 RX ADMIN — Medication 5 MILLIGRAM(S): at 00:29

## 2018-08-22 RX ADMIN — Medication 5 MILLIGRAM(S): at 09:16

## 2018-08-22 RX ADMIN — Medication 650 MILLIGRAM(S): at 23:06

## 2018-08-22 RX ADMIN — SODIUM CHLORIDE 125 MILLILITER(S): 9 INJECTION INTRAMUSCULAR; INTRAVENOUS; SUBCUTANEOUS at 07:19

## 2018-08-22 RX ADMIN — Medication 10 MILLILITER(S): at 07:30

## 2018-08-22 RX ADMIN — OXYCODONE AND ACETAMINOPHEN 1 TABLET(S): 5; 325 TABLET ORAL at 02:02

## 2018-08-22 RX ADMIN — Medication 5 MILLIGRAM(S): at 21:41

## 2018-08-22 RX ADMIN — QUETIAPINE FUMARATE 25 MILLIGRAM(S): 200 TABLET, FILM COATED ORAL at 03:42

## 2018-08-22 RX ADMIN — Medication 300 MILLIGRAM(S): at 09:16

## 2018-08-22 RX ADMIN — HEPARIN SODIUM 20 UNIT(S)/HR: 5000 INJECTION INTRAVENOUS; SUBCUTANEOUS at 03:12

## 2018-08-22 RX ADMIN — Medication 5 MILLIGRAM(S): at 00:28

## 2018-08-22 RX ADMIN — QUETIAPINE FUMARATE 25 MILLIGRAM(S): 200 TABLET, FILM COATED ORAL at 23:02

## 2018-08-22 RX ADMIN — HEPARIN SODIUM 20 UNIT(S)/HR: 5000 INJECTION INTRAVENOUS; SUBCUTANEOUS at 16:00

## 2018-08-22 RX ADMIN — OXYCODONE AND ACETAMINOPHEN 1 TABLET(S): 5; 325 TABLET ORAL at 02:39

## 2018-08-22 RX ADMIN — Medication 100 MILLIGRAM(S): at 00:28

## 2018-08-22 RX ADMIN — SENNA PLUS 2 TABLET(S): 8.6 TABLET ORAL at 19:04

## 2018-08-22 NOTE — PHYSICAL THERAPY INITIAL EVALUATION ADULT - PERTINENT HX OF CURRENT PROBLEM, REHAB EVAL
59 yo old male presenting with b/l lower extremity edema and b/l hydronephrosis likely 2/2 LAD 2/2 NHL.

## 2018-08-22 NOTE — PROVIDER CONTACT NOTE (OTHER) - ASSESSMENT
hematuria still present, maddox draining and patent; pt c/o of unable to sleep and unrelieved bladder pain; pt insistent of having the heparin and maddox d/c

## 2018-08-22 NOTE — PHYSICAL THERAPY INITIAL EVALUATION ADULT - CRITERIA FOR SKILLED THERAPEUTIC INTERVENTIONS
impairments found/anticipated discharge recommendation/rehab potential/risk reduction/prevention/therapy frequency/functional limitations in following categories

## 2018-08-22 NOTE — CONSULT NOTE ADULT - SUBJECTIVE AND OBJECTIVE BOX
See previous consult notes for further information.    Pt on schedule for Incisional bx of left LN under GA for Friday 8/24 with Dr. Alcazar   - NPO at midnight on Thursday (8/23)  - Preop labs: CBC, CMP, PT/PTT, active type and screen   Please page ENT with any questions / concerns.

## 2018-08-22 NOTE — PROGRESS NOTE ADULT - SUBJECTIVE AND OBJECTIVE BOX
ID: [id number] presenting with bilateral hydronephrosis secondary to pelvic lymphadenopathy POD#2 s/p bilateral ureteral stent placement    SUBJECTIVE: Patient still reporting urethral burning sensation despite receiving pyridium TID.  Patient requests Sher catheter removal.      OBJECTIVE:    Vital Signs:  Vital Signs Last 24 Hrs  T(C): 36.7 (22 Aug 2018 06:42), Max: 36.7 (22 Aug 2018 06:42)  T(F): 98 (22 Aug 2018 06:42), Max: 98 (22 Aug 2018 06:42)  HR: 81 (22 Aug 2018 06:42) (73 - 81)  BP: 130/77 (22 Aug 2018 06:42) (122/69 - 130/77)  BP(mean): --  RR: 16 (22 Aug 2018 06:42) (16 - 21)  SpO2: 94% (22 Aug 2018 06:42) (94% - 98%)    Physical Exam:  General: NAD  Pulmonary: Nonlabored breathing, no respiratory distress  Cardiovascular: No heaves/thrills  Abdominal: Soft, NT/ND  Extremities: WWP, no clubbing/cyanosis/edema  Neuro: AAO x3, no focal deficits  : Sher in place, draining fruit punch colored urine    Ins and Outs:  I&O's Summary    21 Aug 2018 07:01  -  22 Aug 2018 07:00  --------------------------------------------------------  IN: 2485 mL / OUT: 2400 mL / NET: 85 mL    22 Aug 2018 07:01  -  22 Aug 2018 09:42  --------------------------------------------------------  IN: 435 mL / OUT: 0 mL / NET: 435 mL        Labs:                        9.7    8.2   )-----------( 225      ( 22 Aug 2018 05:37 )             29.3     08-22    136  |  102  |  54<H>  ----------------------------<  98  5.0   |  19<L>  |  2.84<H>    Ca    8.4      22 Aug 2018 05:36  Phos  4.5     08-22  Mg     2.2     08-22      PT/INR - ( 22 Aug 2018 05:37 )   PT: 11.7 sec;   INR: 1.05          PTT - ( 22 Aug 2018 05:37 )  PTT:78.3 sec    CAPILLARY BLOOD GLUCOSE      POCT Blood Glucose.: 101 mg/dL (22 Aug 2018 08:22)        Radiology & Additional Studies:

## 2018-08-22 NOTE — PHYSICAL THERAPY INITIAL EVALUATION ADULT - ADDITIONAL COMMENTS
Pt lives w/ wife in elevator access apt building w/ 2 steps to enter. States that prior to this admission he was independent in all ADLs. Denies use of DME for ambulation, no HHA, no HPT

## 2018-08-22 NOTE — PROGRESS NOTE ADULT - SUBJECTIVE AND OBJECTIVE BOX
SUBJECTIVE: Patient seen and examined bedside.    Complains of urethral burning    heparin  Infusion 2000 Unit(s)/Hr IV Continuous <Continuous>      Vital Signs Last 24 Hrs  T(C): 36.6 (22 Aug 2018 11:36), Max: 36.7 (22 Aug 2018 06:42)  T(F): 97.8 (22 Aug 2018 11:36), Max: 98 (22 Aug 2018 06:42)  HR: 85 (22 Aug 2018 11:36) (73 - 85)  BP: 134/85 (22 Aug 2018 11:36) (122/69 - 134/85)  BP(mean): --  RR: 16 (22 Aug 2018 11:36) (16 - 21)  SpO2: 96% (22 Aug 2018 11:36) (94% - 98%)  I&O's Detail    21 Aug 2018 07:01  -  22 Aug 2018 07:00  --------------------------------------------------------  IN:    heparin Infusion: 360 mL    sodium chloride 0.9%.: 2125 mL  Total IN: 2485 mL    OUT:    Indwelling Catheter - Urethral: 2400 mL  Total OUT: 2400 mL    Total NET: 85 mL      22 Aug 2018 07:01  -  22 Aug 2018 12:13  --------------------------------------------------------  IN:    heparin Infusion: 60 mL    sodium chloride 0.9%.: 625 mL  Total IN: 685 mL    OUT:  Total OUT: 0 mL    Total NET: 685 mL        Gen: NAD, resting comfortably in bed. Well developed, well groomed, appears stated age  Neuro: CNII-XII grossly intact. AAOx3. Follows commands  HEENT: PERRL, EOMI, MMM. Left supraclavicular lymphadenopathy  Pulm: CTAB, no respiratory distress, nonlabored breathing  C/V: RRR, no MRG  Abd: Soft, NT/ND. No tympany, no rebound, no guarding.  Extrem: WWP, +BLE edema, nontender. No cyanosis, pallor.  Psych: normal affect, responds appropriately to questions    LABS:                        9.7    8.2   )-----------( 225      ( 22 Aug 2018 05:37 )             29.3     08-22    136  |  102  |  54<H>  ----------------------------<  98  5.0   |  19<L>  |  2.84<H>    Ca    8.4      22 Aug 2018 05:36  Phos  4.5     08-22  Mg     2.2     08-22      PT/INR - ( 22 Aug 2018 05:37 )   PT: 11.7 sec;   INR: 1.05          PTT - ( 22 Aug 2018 05:37 )  PTT:78.3 sec      RADIOLOGY & ADDITIONAL STUDIES:

## 2018-08-22 NOTE — PROGRESS NOTE ADULT - ASSESSMENT
A/P: 58M presenting with bilateral hydronephrosis secondary to pelvic lymphadenopathy POD#2 s/p bilateral ureteral stent placement.  Patient has had good urine output and does not have any clots in his Sher catheter.  - Removed Sher catheter  - Urine may continue to remain fruit punch colored  - Encourage PO intake and maintain well hydrated  - Continue care per primary team  - Follow-up with Dr. Valverde as an outpatient to discuss ureteral stent exchange in 3 months A/P: 58M presenting with bilateral hydronephrosis secondary to pelvic lymphadenopathy POD#2 s/p bilateral ureteral stent placement.  Patient has had good urine output and does not have any clots in his Sher catheter.  - Removed Sher catheter  - Urine may continue to remain fruit punch colored  - Encourage PO intake and maintain well hydrated  - Continue care per primary team  - Follow-up with Dr. Valverde as an outpatient to discuss ureteral stent exchange in 3 months    Addendum: Cr slowly downtrending, recommend renal ultrasound to evaluate hydronephrosis, stent position A/P: 58M presenting with bilateral hydronephrosis secondary to pelvic lymphadenopathy POD#2 s/p bilateral ureteral stent placement.  Patient has had good urine output and does not have any clots in his Sher catheter.  - Removed Sher catheter  - Urine may continue to remain fruit punch colored  - Encourage PO intake and maintain well hydrated  - Continue care per primary team  - Follow-up with Dr. Valverde as an outpatient to discuss ureteral stent exchange in 3 months    Addendum: Cr slowly downtrending, PET CT with no hydronephrosis

## 2018-08-22 NOTE — PROGRESS NOTE ADULT - ASSESSMENT
59yoM with PMH HCV, EtOH abuse, BLE edema, admitted with DVT and concern for lymphoma on CT; General Surgery consulted for lymph node biopsy    PET scan reviewed. Appreciate ENT input on LN biopsy--plan for add-on case tomorrow.  Appreciate care per primary team  Team 4C to follow 59yoM with PMH HCV, EtOH abuse, BLE edema, admitted with DVT and concern for lymphoma on CT; General Surgery consulted for lymph node biopsy    PET scan reviewed. Appreciate ENT input on LN biopsy--plan for add-on case tomorrow.  Appreciate care per primary team  General Surgery/Team 4C to sign off. Please reconsult as needed with any questions or concerns

## 2018-08-22 NOTE — PHYSICAL THERAPY INITIAL EVALUATION ADULT - GAIT DEVIATIONS NOTED, PT EVAL
decreased step length/decreased weight-shifting ability/increased time in double stance/decreased nancy

## 2018-08-22 NOTE — PROGRESS NOTE ADULT - SUBJECTIVE AND OBJECTIVE BOX
Patient is a 58y old  Male who presents with a chief complaint of b/l leg swelling (19 Aug 2018 17:55)      INTERVAL HPI/OVERNIGHT EVENTS:    feels well  still has hematuria, slight dysuria  afebrile    ROS  (- ) headache  ( -  ) chills  ( - ) dyspnea  (  - ) cough  (  - ) chest pain  (  - ) palpatations  ( - ) dizziness/lightheadedness  (  - ) nausea/vomiting  (  - ) abd pain  (  - ) diarrhea  (  - ) melena  (  - ) hematochezia       (  - ) motor weakness  ( - ) extremity numbness  ( - ) back pain  ( + ) tolerating POs  ( + ) BM  ROS: 12 point review of systems otherwise negative              MEDICATIONS  (STANDING):  allopurinol 300 milliGRAM(s) Oral daily  heparin  Infusion 2000 Unit(s)/Hr (20 mL/Hr) IV Continuous <Continuous>  melatonin 5 milliGRAM(s) Oral at bedtime  senna 2 Tablet(s) Oral at bedtime  sodium chloride 0.9%. 1000 milliLiter(s) (75 mL/Hr) IV Continuous <Continuous>    MEDICATIONS  (PRN):  acetaminophen    Suspension. 650 milliGRAM(s) Oral every 6 hours PRN Mild Pain (1 - 3)  diphenhydrAMINE   Capsule 50 milliGRAM(s) Oral every 4 hours PRN Rash and/or Itching  lidocaine 2% Gel 1 Application(s) Topical every 3 hours PRN pain  lidocaine 2% Jelly 10 milliLiter(s) IntraUrethral two times a day PRN pain at urethra  oxybutynin 5 milliGRAM(s) Oral every 8 hours PRN Bladder spasms      Allergies    No Known Allergies    Intolerances          Vital Signs Last 24 Hrs  T(C): 36.9 (22 Aug 2018 18:57), Max: 36.9 (22 Aug 2018 18:57)  T(F): 98.5 (22 Aug 2018 18:57), Max: 98.5 (22 Aug 2018 18:57)  HR: 88 (22 Aug 2018 18:57) (81 - 88)  BP: 120/71 (22 Aug 2018 18:57) (120/71 - 134/85)  BP(mean): --  RR: 16 (22 Aug 2018 18:57) (16 - 21)  SpO2: 90% (22 Aug 2018 18:57) (90% - 98%)  CAPILLARY BLOOD GLUCOSE      POCT Blood Glucose.: 101 mg/dL (22 Aug 2018 08:22)      08-21 @ 07:01 - 08-22 @ 07:00  --------------------------------------------------------  IN: 2485 mL / OUT: 2400 mL / NET: 85 mL    08-22 @ 07:01 - 08-22 @ 20:27  --------------------------------------------------------  IN: 1640 mL / OUT: 2350 mL / NET: -710 mL        Physical Exam:    Daily     Daily   General:  Well appearing   HEENT:  Nonicteric, PERRLA, oral pharynx w/o erythema/exudate,  neck supple, firm LAD at right supraclavicular region   CV:  S1O4mna , RRR,  no JVD  Lungs:  CTA B/L, no wheezes, rales, rhonchi  Abdomen:  positive BS, Soft, non-tender, non distended, no hepatosplenomegaly  Extremities:  2+ DP/radial pulses b/l, no cyanosis, no edema  Back: no cva or midline tenderness  Skin:  Warm and dry   :  No maddox, blood urine is red, w/o clots.   Neuro:  AAOx3,  CN II-XII grossly intact, 5/5 str all 4 ext, sensation intact, (-) dysmetria b/l (-) dysdiadochokinesia bl  No Restraints    LABS:                        9.7    8.2   )-----------( 225      ( 22 Aug 2018 05:37 )             29.3     08-22    136  |  102  |  54<H>  ----------------------------<  98  5.0   |  19<L>  |  2.84<H>    Ca    8.4      22 Aug 2018 05:36  Phos  4.5     08-22  Mg     2.2     08-22      PT/INR - ( 22 Aug 2018 05:37 )   PT: 11.7 sec;   INR: 1.05          PTT - ( 22 Aug 2018 05:37 )  PTT:78.3 sec        RADIOLOGY & ADDITIONAL TESTS:

## 2018-08-22 NOTE — PROGRESS NOTE ADULT - ASSESSMENT
57 yo old male presenting with b/l lower extremity edema and b/l hydronephrosis and diffuse lymphadenopathy

## 2018-08-22 NOTE — PHYSICAL THERAPY INITIAL EVALUATION ADULT - GENERAL OBSERVATIONS, REHAB EVAL
Pt received sitting In bedside chair +IV, +blood on gown from hematuria (RN Meera coronado), c/o abdominal pain.

## 2018-08-22 NOTE — PROGRESS NOTE ADULT - SUBJECTIVE AND OBJECTIVE BOX
OVERNIGHT EVENTS: NETO    SUBJECTIVE / INTERVAL HPI: Patient seen and examined at bedside. Patient complaining that maddox causing constant stinging pain. Patient denies fevers, chills, night sweats, n/v/d/c, chest pain, SOB.     VITAL SIGNS:  Vital Signs Last 24 Hrs  T(C): 36.7 (22 Aug 2018 21:27), Max: 36.9 (22 Aug 2018 18:57)  T(F): 98 (22 Aug 2018 21:27), Max: 98.5 (22 Aug 2018 18:57)  HR: 88 (22 Aug 2018 21:27) (81 - 88)  BP: 131/82 (22 Aug 2018 21:27) (120/71 - 134/85)  BP(mean): --  RR: 15 (22 Aug 2018 21:27) (15 - 21)  SpO2: 96% (22 Aug 2018 21:27) (90% - 98%)    PHYSICAL EXAM:    General: Appears uncomfortable and agitated. Red punch colored urine in maddox reservoir   HEENT: NC/AT; PERRL, anicteric sclera; MMM  Neck: supple  Cardiovascular: +S1/S2, RRR  Respiratory: CTA B/L; no W/R/R  Gastrointestinal: soft, NT/ND; +BSx4  Extremities: b/l 2+ pitting edema in LEs  Vascular: 2+ radial, DP/PT pulses B/L  Neurological: AAOx3; no focal deficits    MEDICATIONS:  MEDICATIONS  (STANDING):  allopurinol 300 milliGRAM(s) Oral daily  heparin  Infusion 2000 Unit(s)/Hr (20 mL/Hr) IV Continuous <Continuous>  melatonin 5 milliGRAM(s) Oral at bedtime  senna 2 Tablet(s) Oral at bedtime  sodium chloride 0.9%. 1000 milliLiter(s) (75 mL/Hr) IV Continuous <Continuous>    MEDICATIONS  (PRN):  acetaminophen    Suspension. 650 milliGRAM(s) Oral every 6 hours PRN Mild Pain (1 - 3)  diphenhydrAMINE   Capsule 50 milliGRAM(s) Oral every 4 hours PRN Rash and/or Itching  lidocaine 2% Gel 1 Application(s) Topical every 3 hours PRN pain  lidocaine 2% Jelly 10 milliLiter(s) IntraUrethral two times a day PRN pain at urethra  oxybutynin 5 milliGRAM(s) Oral every 8 hours PRN Bladder spasms      ALLERGIES:  Allergies    No Known Allergies    Intolerances        LABS:                        9.7    8.2   )-----------( 225      ( 22 Aug 2018 05:37 )             29.3     08-22    136  |  102  |  54<H>  ----------------------------<  98  5.0   |  19<L>  |  2.84<H>    Ca    8.4      22 Aug 2018 05:36  Phos  4.5     08-22  Mg     2.2     08-22      PT/INR - ( 22 Aug 2018 05:37 )   PT: 11.7 sec;   INR: 1.05          PTT - ( 22 Aug 2018 05:37 )  PTT:78.3 sec    CAPILLARY BLOOD GLUCOSE      POCT Blood Glucose.: 101 mg/dL (22 Aug 2018 08:22)      RADIOLOGY & ADDITIONAL TESTS: Reviewed. OVERNIGHT EVENTS: NETO    SUBJECTIVE / INTERVAL HPI: Patient seen and examined at bedside. Patient complaining that maddox causing constant stinging pain. Patient denies fevers, chills, night sweats, n/v/d/c, chest pain, SOB.     VITAL SIGNS:  Vital Signs Last 24 Hrs  T(C): 36.7 (22 Aug 2018 21:27), Max: 36.9 (22 Aug 2018 18:57)  T(F): 98 (22 Aug 2018 21:27), Max: 98.5 (22 Aug 2018 18:57)  HR: 88 (22 Aug 2018 21:27) (81 - 88)  BP: 131/82 (22 Aug 2018 21:27) (120/71 - 134/85)  BP(mean): --  RR: 15 (22 Aug 2018 21:27) (15 - 21)  SpO2: 96% (22 Aug 2018 21:27) (90% - 98%)    PHYSICAL EXAM:    General: Appears uncomfortable and agitated. Red punch colored urine in mdadox reservoir   HEENT: NC/AT; PERRL, anicteric sclera; MMM  Neck: supple  Cardiovascular: +S1/S2, RRR  Respiratory: Mild bibasilar crackles appreciated. No rhonchi, no wheezing.  Gastrointestinal: soft, NT/ND; +BSx4  Extremities: b/l 2+ pitting edema in LEs. No erythema, no cyanosis b/l.  Vascular: 2+ radial, 1+ dorsalis pedis b/l  Neurological: AAOx3; no focal deficits    MEDICATIONS:  MEDICATIONS  (STANDING):  allopurinol 300 milliGRAM(s) Oral daily  heparin  Infusion 2000 Unit(s)/Hr (20 mL/Hr) IV Continuous <Continuous>  melatonin 5 milliGRAM(s) Oral at bedtime  senna 2 Tablet(s) Oral at bedtime  sodium chloride 0.9%. 1000 milliLiter(s) (75 mL/Hr) IV Continuous <Continuous>    MEDICATIONS  (PRN):  acetaminophen    Suspension. 650 milliGRAM(s) Oral every 6 hours PRN Mild Pain (1 - 3)  diphenhydrAMINE   Capsule 50 milliGRAM(s) Oral every 4 hours PRN Rash and/or Itching  lidocaine 2% Gel 1 Application(s) Topical every 3 hours PRN pain  lidocaine 2% Jelly 10 milliLiter(s) IntraUrethral two times a day PRN pain at urethra  oxybutynin 5 milliGRAM(s) Oral every 8 hours PRN Bladder spasms      ALLERGIES:  Allergies    No Known Allergies    Intolerances        LABS:                        9.7    8.2   )-----------( 225      ( 22 Aug 2018 05:37 )             29.3     08-22    136  |  102  |  54<H>  ----------------------------<  98  5.0   |  19<L>  |  2.84<H>    Ca    8.4      22 Aug 2018 05:36  Phos  4.5     08-22  Mg     2.2     08-22      PT/INR - ( 22 Aug 2018 05:37 )   PT: 11.7 sec;   INR: 1.05          PTT - ( 22 Aug 2018 05:37 )  PTT:78.3 sec    CAPILLARY BLOOD GLUCOSE      POCT Blood Glucose.: 101 mg/dL (22 Aug 2018 08:22)      RADIOLOGY & ADDITIONAL TESTS: Reviewed.  < from: NM PET/CT Onc FDG Skull to Thigh, Inital (08.21.18 @ 11:30) >  Impression: Extensive bulky hypermetabolic lymphadenopathy in the neck,   chest, abdomen and pelvis. Findings consistent with lymphoma. There is   also involvement of osseous structures and probably the right kidney.    < from: Echocardiogram (08.21.18 @ 11:12) >  Interpretation Summary  The left atrial size is normal. Right atrial size is normal.There is mild   aortic valve thickening. No aortic regurgitation noted. No   hemodynamically   significant valvular aortic stenosis.  There is mild mitral valve   thickening.There is trace mitral regurgitation.Structurally normal   tricuspid   valve. There is trace tricuspid regurgitation.The pulmonic valve is not   well   visualized.No pulmonic regurgitation noted.The right ventricle is normal   in   size and function.There is mild concentric left ventricular hypertrophy.   The   left ventricular wall motion is normal. The left ventricular ejection   fraction   is 63%. Normal LV diastolic function.   No aortic root dilatation.There   is no   pericardial effusion.No prior study for comparison.

## 2018-08-23 PROBLEM — B19.20 UNSPECIFIED VIRAL HEPATITIS C WITHOUT HEPATIC COMA: Chronic | Status: ACTIVE | Noted: 2018-08-19

## 2018-08-23 LAB
AFP-TM SERPL-MCNC: 1.3 NG/ML — SIGNIFICANT CHANGE UP
ANION GAP SERPL CALC-SCNC: 14 MMOL/L — SIGNIFICANT CHANGE UP (ref 5–17)
APTT BLD: 81.2 SEC — HIGH (ref 27.5–37.4)
BLD GP AB SCN SERPL QL: NEGATIVE — SIGNIFICANT CHANGE UP
BUN SERPL-MCNC: 47 MG/DL — HIGH (ref 7–23)
CALCIUM SERPL-MCNC: 8.6 MG/DL — SIGNIFICANT CHANGE UP (ref 8.4–10.5)
CEA SERPL-MCNC: 2.2 NG/ML — SIGNIFICANT CHANGE UP (ref 0–3.8)
CHLORIDE SERPL-SCNC: 105 MMOL/L — SIGNIFICANT CHANGE UP (ref 96–108)
CO2 SERPL-SCNC: 19 MMOL/L — LOW (ref 22–31)
CREAT SERPL-MCNC: 2.39 MG/DL — HIGH (ref 0.5–1.3)
CULTURE RESULTS: NO GROWTH — SIGNIFICANT CHANGE UP
GLUCOSE SERPL-MCNC: 90 MG/DL — SIGNIFICANT CHANGE UP (ref 70–99)
HCG-TM SERPL-ACNC: <1 MIU/ML — SIGNIFICANT CHANGE UP
HCT VFR BLD CALC: 28.4 % — LOW (ref 39–50)
HEMATOPATHOLOGY REPORT: SIGNIFICANT CHANGE UP
HGB BLD-MCNC: 9.1 G/DL — LOW (ref 13–17)
LDH SERPL L TO P-CCNC: 794 U/L — HIGH (ref 50–242)
MAGNESIUM SERPL-MCNC: 1.9 MG/DL — SIGNIFICANT CHANGE UP (ref 1.6–2.6)
MCHC RBC-ENTMCNC: 31.6 PG — SIGNIFICANT CHANGE UP (ref 27–34)
MCHC RBC-ENTMCNC: 32 G/DL — SIGNIFICANT CHANGE UP (ref 32–36)
MCV RBC AUTO: 98.6 FL — SIGNIFICANT CHANGE UP (ref 80–100)
PHOSPHATE SERPL-MCNC: 5.1 MG/DL — HIGH (ref 2.5–4.5)
PLATELET # BLD AUTO: 208 K/UL — SIGNIFICANT CHANGE UP (ref 150–400)
POTASSIUM SERPL-MCNC: 4.9 MMOL/L — SIGNIFICANT CHANGE UP (ref 3.5–5.3)
POTASSIUM SERPL-SCNC: 4.9 MMOL/L — SIGNIFICANT CHANGE UP (ref 3.5–5.3)
PSA FREE FLD-MCNC: >50 NG/ML — SIGNIFICANT CHANGE UP
PSA FREE FLD-MCNC: SIGNIFICANT CHANGE UP
PSA FREE MFR FLD: 750.4 NG/ML — HIGH (ref 0–4)
RBC # BLD: 2.88 M/UL — LOW (ref 4.2–5.8)
RBC # FLD: 12.2 % — SIGNIFICANT CHANGE UP (ref 10.3–16.9)
RH IG SCN BLD-IMP: POSITIVE — SIGNIFICANT CHANGE UP
SODIUM SERPL-SCNC: 138 MMOL/L — SIGNIFICANT CHANGE UP (ref 135–145)
SPECIMEN SOURCE: SIGNIFICANT CHANGE UP
URATE SERPL-MCNC: 1.2 MG/DL — LOW (ref 3.4–8.8)
WBC # BLD: 6.1 K/UL — SIGNIFICANT CHANGE UP (ref 3.8–10.5)
WBC # FLD AUTO: 6.1 K/UL — SIGNIFICANT CHANGE UP (ref 3.8–10.5)

## 2018-08-23 PROCEDURE — 99233 SBSQ HOSP IP/OBS HIGH 50: CPT

## 2018-08-23 RX ORDER — QUETIAPINE FUMARATE 200 MG/1
25 TABLET, FILM COATED ORAL ONCE
Qty: 0 | Refills: 0 | Status: COMPLETED | OUTPATIENT
Start: 2018-08-23 | End: 2018-08-23

## 2018-08-23 RX ADMIN — Medication 650 MILLIGRAM(S): at 23:37

## 2018-08-23 RX ADMIN — Medication 5 MILLIGRAM(S): at 21:55

## 2018-08-23 RX ADMIN — Medication 300 MILLIGRAM(S): at 14:25

## 2018-08-23 RX ADMIN — QUETIAPINE FUMARATE 25 MILLIGRAM(S): 200 TABLET, FILM COATED ORAL at 23:18

## 2018-08-23 RX ADMIN — SENNA PLUS 2 TABLET(S): 8.6 TABLET ORAL at 21:55

## 2018-08-23 RX ADMIN — Medication 650 MILLIGRAM(S): at 00:21

## 2018-08-23 NOTE — PROGRESS NOTE ADULT - SUBJECTIVE AND OBJECTIVE BOX
ID: [id number] presenting with bilateral hydronephrosis secondary to pelvic lymphadenopathy POD#3 s/p bilateral ureteral stent placement.    24 Hour Events: Primary team stopped heparin drip this morning.    SUBJECTIVE: Patient complains of dysuria and scrotal pain.      OBJECTIVE:    Vital Signs:  Vital Signs Last 24 Hrs  T(C): 36.8 (23 Aug 2018 05:25), Max: 36.9 (22 Aug 2018 18:57)  T(F): 98.2 (23 Aug 2018 05:25), Max: 98.5 (22 Aug 2018 18:57)  HR: 98 (23 Aug 2018 05:25) (85 - 98)  BP: 127/79 (23 Aug 2018 05:25) (120/71 - 134/85)  BP(mean): --  RR: 16 (23 Aug 2018 05:25) (15 - 16)  SpO2: 95% (23 Aug 2018 05:25) (90% - 96%)    Physical Exam:  General: NAD  Pulmonary: Nonlabored breathing, no respiratory distress  Cardiovascular: No heaves/thrills  Abdominal: Soft, NT/ND  Extremities: WWP, no clubbing/cyanosis/edema  Neuro: AAO x3, no focal deficits  : No suprapubic tenderness, scrotal edema and tenderness present    Lines/Drains/Tubes: None    Ins and Outs:  I&O's Summary    22 Aug 2018 07:01  -  23 Aug 2018 07:00  --------------------------------------------------------  IN: 2675 mL / OUT: 4450 mL / NET: -1775 mL        Labs:                        9.1    6.1   )-----------( 208      ( 23 Aug 2018 06:53 )             28.4     08-23    138  |  105  |  47<H>  ----------------------------<  90  4.9   |  19<L>  |  2.39<H>    Ca    8.6      23 Aug 2018 06:53  Phos  5.1     08-23  Mg     1.9     08-23      PT/INR - ( 22 Aug 2018 05:37 )   PT: 11.7 sec;   INR: 1.05          PTT - ( 23 Aug 2018 06:53 )  PTT:81.2 sec    CAPILLARY BLOOD GLUCOSE      POCT Blood Glucose.: 101 mg/dL (22 Aug 2018 08:22)        Radiology & Additional Studies:      A/P: 58M presenting with bilateral hydronephrosis secondary to pelvic lymphadenopathy POD#3 s/p bilateral ureteral stent placement.  Patient has had good urine output and continues to have fruit punch colored urine.  Hemoglobin has dropped to 9.1 (10.8 on admission), so primary team has stopped Heparin drip.  Patient's creatinine continues to improve with IVF and PO intake.  Extra hydration likely contributing to scrotal edema/pain.  - Monitor urine output and color now that heparin drip has stopped  - Encourage PO intake and maintain well hydrated  - Continue care per primary team

## 2018-08-23 NOTE — DIETITIAN INITIAL EVALUATION ADULT. - OTHER INFO
58M admitted with b/l leg swelling, neck and scrotal swelling. Presenting with pelvic lymphadenopathy concerning for underlying lymphoproliferative disorder, new prostate cancer w bony mets, DVT, ARF, and anemia. Hx of ETOH abuse and hep C. No noted n/v/d/c, chewing/ swallowing/ pain impacting intake, skin intact. No changes in UBW noted PTA, fluid retention, unsure of exact amount gained. Tolerating renal diet fairly at this time ~50%. POD3 ureteral stent placement, additional biopsies to be done Friday 8/24. Encourage PO intake + reinforce renal diet.

## 2018-08-23 NOTE — PROGRESS NOTE ADULT - SUBJECTIVE AND OBJECTIVE BOX
OVERNIGHT EVENTS: NETO    SUBJECTIVE / INTERVAL HPI: Patient seen and examined at bedside. Patient complaining of burning with urination, bloody urine, urge incontinence, b/l leg swelling, and scrotal swelling. Denies chest pain, SOB, n/v/d/c, fevers, chills, nigh sweats, pain.      VITAL SIGNS:  Vital Signs Last 24 Hrs  T(C): 36.8 (23 Aug 2018 05:25), Max: 36.9 (22 Aug 2018 18:57)  T(F): 98.2 (23 Aug 2018 05:25), Max: 98.5 (22 Aug 2018 18:57)  HR: 98 (23 Aug 2018 05:25) (85 - 98)  BP: 127/79 (23 Aug 2018 05:25) (120/71 - 134/85)  BP(mean): --  RR: 16 (23 Aug 2018 05:25) (15 - 16)  SpO2: 95% (23 Aug 2018 05:25) (90% - 96%)    PHYSICAL EXAM:    General: Resting comfortably in bed, NAD  HEENT: PERRL, anicteric sclera; MMM  Neck: cervical lymphadenopathy appreciated in supraclavicular area on left and submandibular on the right.  Cardiovascular: +S1/S2, RRR  Respiratory: CTA b/l. No wheezing, no rales, no rhonchi. Some diminished breath sounds at bilateral bases.  Gastrointestinal: soft, NT/ND; +BSx4  Extremities: 2-3+ pitting edema in b/l lower extremities, nontender. No erythema, no cyanosis b/l  Vascular: 2+ radial, 1+ dorsalis pedis pulses b/l  Neurological: AAOx3; no focal deficits    MEDICATIONS:  MEDICATIONS  (STANDING):  allopurinol 300 milliGRAM(s) Oral daily  heparin  Infusion 2000 Unit(s)/Hr (20 mL/Hr) IV Continuous <Continuous>  melatonin 5 milliGRAM(s) Oral at bedtime  senna 2 Tablet(s) Oral at bedtime  sodium chloride 0.9%. 1000 milliLiter(s) (75 mL/Hr) IV Continuous <Continuous>    MEDICATIONS  (PRN):  acetaminophen    Suspension. 650 milliGRAM(s) Oral every 6 hours PRN Mild Pain (1 - 3)  diphenhydrAMINE   Capsule 50 milliGRAM(s) Oral every 4 hours PRN Rash and/or Itching  lidocaine 2% Gel 1 Application(s) Topical every 3 hours PRN pain  lidocaine 2% Jelly 10 milliLiter(s) IntraUrethral two times a day PRN pain at urethra  oxybutynin 5 milliGRAM(s) Oral every 8 hours PRN Bladder spasms      ALLERGIES:  Allergies    No Known Allergies    Intolerances        LABS:                        9.7    8.2   )-----------( 225      ( 22 Aug 2018 05:37 )             29.3     08-22    136  |  102  |  54<H>  ----------------------------<  98  5.0   |  19<L>  |  2.84<H>    Ca    8.4      22 Aug 2018 05:36  Phos  4.5     08-22  Mg     2.2     08-22      PT/INR - ( 22 Aug 2018 05:37 )   PT: 11.7 sec;   INR: 1.05          PTT - ( 22 Aug 2018 05:37 )  PTT:78.3 sec    CAPILLARY BLOOD GLUCOSE      POCT Blood Glucose.: 101 mg/dL (22 Aug 2018 08:22)      RADIOLOGY & ADDITIONAL TESTS: Reviewed.

## 2018-08-23 NOTE — PROGRESS NOTE ADULT - SUBJECTIVE AND OBJECTIVE BOX
Hematology Oncology Progress Note (Dr. Jung)    SUBJECTIVE / INTERVAL HPI: Patient seen and examined at bedside.   continues to have blood in maddox. no B symptoms. complains of swelling of testicles bothering him. Plan is for excisional bx of supraclavicular lymph node nelly.   Bone marrow biopsy shows evidence of carcinoma, but no lymphoma involvement  He is able to ambulate OOB and get to the chair without difficulties. Otherwise: (-) fever, (-) chills, (-) dizziness, (-) headache, (-) neck pain, (-) chest pain, (-) palpitations, (-)SOB, (-) nausea, (-) vomiting, (-) diarrhea, (-) abdominal pain, (-)weakness, (-) paresthesias.      VITAL SIGNS:  Vital Signs Last 24 Hrs  ICU Vital Signs Last 24 Hrs  T(C): 36.8 (23 Aug 2018 11:15), Max: 36.9 (22 Aug 2018 18:57)  T(F): 98.3 (23 Aug 2018 11:15), Max: 98.5 (22 Aug 2018 18:57)  HR: 100 (23 Aug 2018 11:15) (88 - 100)  BP: 138/87 (23 Aug 2018 11:15) (120/71 - 138/87)  RR: 16 (23 Aug 2018 11:15) (15 - 16)  SpO2: 98% (23 Aug 2018 11:15) (90% - 98%)    PHYSICAL EXAM:    General: well appearing, sitting in chair, and in no acute distress  Skin: (+) venous stasis changes to BLE  HEENT: normocephalic, atraumatic, PERRL, anicteric sclera; no conjunctival pallor, mucus membranes moist  Neck: supple, no JVD  Cardiovascular: +S1/S2, regular rate and rhythm; no murmurs, no rub, no gallop  Respiratory: clear to auscultation bilaterally, decreased breath sounds B/l. no rhonchi, no wheeze, no rales  Gastrointestinal: soft, active bowel sounds, non-tender, non-distended  : (+) maddox in place draining hematuria  Extremities: Warm, dry; no clubbing or cyanosis, (+) 4+ pitting edema to BLE predominantly from feet up to the knees but also mid thigh edema noted though not as severe. No tenderness. (+) L elbow w/ soft bulbous mass without any tenderness to palpation  Lymph nodes: (+) large palpable L supraclavicular LAD, (+) L axillary and b/l inguinal LAD  Vascular: 2+ radial, DP pulses bilaterally  Neurological: AAOx3; no focal deficits                          9.1    6.1   )-----------( 208      ( 23 Aug 2018 06:53 )             28.4         CBC Full  -  ( 23 Aug 2018 06:53 )  WBC Count : 6.1 K/uL  Hemoglobin : 9.1 g/dL  Hematocrit : 28.4 %  Platelet Count - Automated : 208 K/uL  Mean Cell Volume : 98.6 fL  Mean Cell Hemoglobin : 31.6 pg  Mean Cell Hemoglobin Concentration : 32.0 g/dL    08-23    138  |  105  |  47<H>  ----------------------------<  90  4.9   |  19<L>  |  2.39<H>    Ca    8.6      23 Aug 2018 06:53  Phos  5.1     08-23  Mg     1.9     08-23          PT/INR - ( 22 Aug 2018 05:37 )   PT: 11.7 sec;   INR: 1.05          PTT - ( 23 Aug 2018 06:53 )  PTT:81.2 sec    < from: NM PET/CT Onc FDG Skull to Thigh, Inital (08.21.18 @ 11:30) >  Findings: There is extensive FDG avid lymphadenopathy. There is   hypermetabolic left cervical, supraclavicular, axillary, and   para-aortic/retroperitoneal lymphadenopathy in the abdomen and pelvis.   There is also hypermetabolic lymphadenopathy extending along the internal   and external inguinal lymph node groups.    Increased FDG activity is also noted in the left posterior fifth rib,   short segments of multiple other ribs, right scapula, right L5 pedicle.    Irregular FDG activity is also seen throughout the right kidney in a   pattern suggesting infiltration of the renal parenchyma.    There is a 2.0 cm sialolith in the right submandibular gland.    The heart is normal in size.  No pericardial effusion is seen.     Small bilateral pleural effusions are seen, right greater than left with   compressive atelectasis of the right basal lung zone.     Images of the upper abdomen demonstrate no focal hepatic abnormalities.     No radiopaque stones are seen in the gallbladder.  The pancreas is normal   in appearance.  No splenic abnormalities are seen.    2.5 cm right adrenal nodule. Left adrenal gland is unremarkable. There   has been interval placement of bilateral ureteral stents with improvement   in bilateral hydronephrosis. Noabdominal aortic aneurysm is seen. No   lymphadenopathy is seen.     Evaluation of the bowel demonstrates no abnormalities. There is pelvic   ascites.    Images of the pelvis demonstrate normal prostate.    Impression: Extensive bulky hypermetabolic lymphadenopathy in the neck,   chest, abdomen and pelvis. Findings consistent with lymphoma. There is   also involvement of osseous structures and probably the right kidney.    < end of copied text >

## 2018-08-23 NOTE — DIETITIAN INITIAL EVALUATION ADULT. - ENERGY NEEDS
ABW used for calculations as pt between % of IBW.   IBW 83kg, 116% IBW, BMI 28.2, ht 73"   Nutrient needs based on [facility] standards of care for non-stressed oncology pts   Fluid per team 2/2 ARF

## 2018-08-23 NOTE — DIETITIAN INITIAL EVALUATION ADULT. - PROBLEM SELECTOR PLAN 9
Monitor lytes and replete as needed.  Diet Renal/Dash diet. NPO Midnight   DVT prophylaxis: At this time pt to be on Heparin drip.

## 2018-08-23 NOTE — DIETITIAN INITIAL EVALUATION ADULT. - PROBLEM SELECTOR PLAN 1
Pt presenting with c/o Lower extremity swelling. In setting of lymphadenopathy (and likely Lymphoma) likely cause of DVT.  -Typically would like to have the pt on Lovenox. But pt needs lymph node biopsy in the am also has KIRSTEN. So will start pt on Heparin drip for now.   -Monitor aPTT q6h

## 2018-08-23 NOTE — PROGRESS NOTE ADULT - ASSESSMENT
59 yo old male presenting with b/l lower extremity edema and b/l hydronephrosis s/p b/l stenting by urology, likely due to bulky lymphadenopathy from metastatic prostate CA.

## 2018-08-23 NOTE — PROGRESS NOTE ADULT - ASSESSMENT
57 yo M presenting with b/l lower ext edema and diffuse lymphadenopathy apparent on CT imaging, concerning for underlying lymphoproliferative disorder    # Lymphadenopathy  - CT w/ evidence diffuse LAD in left supraclavicular region, left axilla, posterior mediastinal and retroperitoneum with bulky extensive periaortic and interaortocaval lymphadenopathy concerning for lymphoproliferative disorder. elevated LDH and uric acid consistent with underlying highly proliferative malignancy, haptoglobin normal- no evidence of hemolysis. Peripheral smear shows atypical lymphocytes.  - PET/CT shows Extensive bulky hypermetabolic lymphadenopathy in the neck, chest, abdomen and pelvis. Findings consistent with lymphoma. There is also involvement of osseous structures and probably the right kidney.  - ENT to do excisional bx of supraclavicular lymph node nelly.  - Hep B: non reactive. Hep C: reactive (consistent w/ history)- viral load high  - perform 2D Echocardiogram- performed, showed normal EF  - urology consult for obstructive nephropathy 2/2 to bulky RP LAD: now s/p B/l ureteral stents, continues to have hematuria- heparin gtt stopped  - bone marrow bx performed 8/20 prelim result showed no evidence of lymphoma involvement but does show that there is carcinoma present    # DVT  - LE dopplers show thrombosis of a left calf muscular vein, hold of on lovenox therapy due to renal failure and need for excisional bx for dx of lymphoma  - heparin gtt on hold due to drop in H&H from ongoing hematuria.    # -normocytic anemia, peripheral smear reviewed  - etiology likely dilutional + chronic hematuria while on hep gtt, iron studies, Vit B12 and folate levels adequate 57 yo M presenting with b/l lower ext edema and diffuse lymphadenopathy apparent on CT imaging, concerning for underlying lymphoproliferative disorder    # Lymphadenopathy  - CT w/ evidence diffuse LAD in left supraclavicular region, left axilla, posterior mediastinal and retroperitoneum with bulky extensive periaortic and interaortocaval lymphadenopathy concerning for lymphoproliferative disorder. elevated LDH and uric acid consistent with underlying highly proliferative malignancy, haptoglobin normal- no evidence of hemolysis. Peripheral smear shows atypical lymphocytes.  - PET/CT shows Extensive bulky hypermetabolic lymphadenopathy in the neck, chest, abdomen and pelvis. Findings consistent with lymphoma. There is also involvement of osseous structures and probably the right kidney.  - ENT to do excisional bx of supraclavicular lymph node nelly.  - Hep B: non reactive. Hep C: reactive (consistent w/ history)- viral load high  - perform 2D Echocardiogram- performed, showed normal EF  - urology consult for obstructive nephropathy 2/2 to bulky RP LAD: now s/p B/l ureteral stents, continues to have hematuria- heparin gtt stopped  - bone marrow bx performed 8/20 prelim result showed no evidence of lymphoma involvement but does show that there is carcinoma present, IHC stains suggestive of prostate adenocarcinoma      # Prostate adenocarcinoma  - incidental finding from pathology of bone marrow biopsy, pending PSA level. check serum testosterone level  - nuclear medicine bone scan recommended to clearly delineate extent of bony mets. On PET/CT, there is no evidence of visceral mets (lung/liver). PET/CT is not ideal imaging modality to assess for prostate cancer staging.     # DVT  - LE dopplers show thrombosis of a left calf muscular vein, hold of on lovenox therapy due to renal failure and need for excisional bx for dx of lymphoma  - heparin gtt on hold due to drop in H&H from ongoing hematuria.    # -normocytic anemia, peripheral smear reviewed  - etiology likely dilutional + chronic hematuria while on hep gtt, iron studies, Vit B12 and folate levels adequate

## 2018-08-24 ENCOUNTER — APPOINTMENT (OUTPATIENT)
Dept: OTOLARYNGOLOGY | Facility: HOSPITAL | Age: 58
End: 2018-08-24

## 2018-08-24 ENCOUNTER — RESULT REVIEW (OUTPATIENT)
Age: 58
End: 2018-08-24

## 2018-08-24 LAB
ANION GAP SERPL CALC-SCNC: 14 MMOL/L — SIGNIFICANT CHANGE UP (ref 5–17)
APTT BLD: 26.4 SEC — LOW (ref 27.5–37.4)
BUN SERPL-MCNC: 37 MG/DL — HIGH (ref 7–23)
CALCIUM SERPL-MCNC: 8.5 MG/DL — SIGNIFICANT CHANGE UP (ref 8.4–10.5)
CANCER AG19-9 SERPL-ACNC: 19.8 U/ML — SIGNIFICANT CHANGE UP
CHLORIDE SERPL-SCNC: 104 MMOL/L — SIGNIFICANT CHANGE UP (ref 96–108)
CO2 SERPL-SCNC: 21 MMOL/L — LOW (ref 22–31)
CREAT SERPL-MCNC: 2.01 MG/DL — HIGH (ref 0.5–1.3)
GLUCOSE SERPL-MCNC: 98 MG/DL — SIGNIFICANT CHANGE UP (ref 70–99)
HCT VFR BLD CALC: 24.8 % — LOW (ref 39–50)
HGB BLD-MCNC: 8.1 G/DL — LOW (ref 13–17)
INR BLD: 1.07 — SIGNIFICANT CHANGE UP (ref 0.88–1.16)
MAGNESIUM SERPL-MCNC: 1.7 MG/DL — SIGNIFICANT CHANGE UP (ref 1.6–2.6)
MCHC RBC-ENTMCNC: 32.4 PG — SIGNIFICANT CHANGE UP (ref 27–34)
MCHC RBC-ENTMCNC: 32.7 G/DL — SIGNIFICANT CHANGE UP (ref 32–36)
MCV RBC AUTO: 99.2 FL — SIGNIFICANT CHANGE UP (ref 80–100)
PLATELET # BLD AUTO: 186 K/UL — SIGNIFICANT CHANGE UP (ref 150–400)
POTASSIUM SERPL-MCNC: 4.9 MMOL/L — SIGNIFICANT CHANGE UP (ref 3.5–5.3)
POTASSIUM SERPL-SCNC: 4.9 MMOL/L — SIGNIFICANT CHANGE UP (ref 3.5–5.3)
PROTHROM AB SERPL-ACNC: 11.9 SEC — SIGNIFICANT CHANGE UP (ref 9.8–12.7)
RBC # BLD: 2.5 M/UL — LOW (ref 4.2–5.8)
RBC # FLD: 11.9 % — SIGNIFICANT CHANGE UP (ref 10.3–16.9)
SODIUM SERPL-SCNC: 139 MMOL/L — SIGNIFICANT CHANGE UP (ref 135–145)
WBC # BLD: 6.1 K/UL — SIGNIFICANT CHANGE UP (ref 3.8–10.5)
WBC # FLD AUTO: 6.1 K/UL — SIGNIFICANT CHANGE UP (ref 3.8–10.5)

## 2018-08-24 PROCEDURE — 38510 BIOPSY/REMOVAL LYMPH NODES: CPT | Mod: LT

## 2018-08-24 PROCEDURE — 99233 SBSQ HOSP IP/OBS HIGH 50: CPT

## 2018-08-24 RX ORDER — QUETIAPINE FUMARATE 200 MG/1
25 TABLET, FILM COATED ORAL ONCE
Qty: 0 | Refills: 0 | Status: COMPLETED | OUTPATIENT
Start: 2018-08-24 | End: 2018-08-24

## 2018-08-24 RX ORDER — MAGNESIUM SULFATE 500 MG/ML
2 VIAL (ML) INJECTION ONCE
Qty: 0 | Refills: 0 | Status: COMPLETED | OUTPATIENT
Start: 2018-08-24 | End: 2018-08-24

## 2018-08-24 RX ORDER — MORPHINE SULFATE 50 MG/1
4 CAPSULE, EXTENDED RELEASE ORAL
Qty: 0 | Refills: 0 | Status: DISCONTINUED | OUTPATIENT
Start: 2018-08-24 | End: 2018-08-27

## 2018-08-24 RX ORDER — ONDANSETRON 8 MG/1
4 TABLET, FILM COATED ORAL EVERY 6 HOURS
Qty: 0 | Refills: 0 | Status: DISCONTINUED | OUTPATIENT
Start: 2018-08-24 | End: 2018-08-27

## 2018-08-24 RX ORDER — SODIUM CHLORIDE 9 MG/ML
1000 INJECTION, SOLUTION INTRAVENOUS
Qty: 0 | Refills: 0 | Status: DISCONTINUED | OUTPATIENT
Start: 2018-08-24 | End: 2018-08-24

## 2018-08-24 RX ADMIN — Medication 5 MILLIGRAM(S): at 22:04

## 2018-08-24 RX ADMIN — SENNA PLUS 2 TABLET(S): 8.6 TABLET ORAL at 22:04

## 2018-08-24 RX ADMIN — Medication 650 MILLIGRAM(S): at 00:12

## 2018-08-24 RX ADMIN — Medication 650 MILLIGRAM(S): at 20:47

## 2018-08-24 RX ADMIN — Medication 50 GRAM(S): at 10:52

## 2018-08-24 RX ADMIN — QUETIAPINE FUMARATE 25 MILLIGRAM(S): 200 TABLET, FILM COATED ORAL at 22:04

## 2018-08-24 RX ADMIN — Medication 650 MILLIGRAM(S): at 21:40
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
## 2018-08-24 NOTE — BRIEF OPERATIVE NOTE - POST-OP DX
Hydronephrosis with ureteral stricture  08/20/2018  Bilaterally  Active  Herman Sibley  Lymphadenopathy  08/24/2018    Active  Demetra Pugh

## 2018-08-24 NOTE — PROGRESS NOTE ADULT - SUBJECTIVE AND OBJECTIVE BOX
Hematology Oncology Progress Note (Dr. Jung)    SUBJECTIVE / INTERVAL HPI: Patient seen and examined at bedside.   pt continues to have blood in maddox, H&H falling. Denies any chest pain or shortness of breath. lower ext swelling still present. plan for excisional bx of supraclavicular LN today, done by ENT    ICU Vital Signs Last 24 Hrs  T(C): 36.9 (24 Aug 2018 10:31), Max: 36.9 (24 Aug 2018 10:29)  T(F): 98.5 (24 Aug 2018 10:31), Max: 98.5 (24 Aug 2018 10:29)  HR: 93 (24 Aug 2018 10:31) (81 - 93)  BP: 119/75 (24 Aug 2018 10:31) (119/75 - 128/81)  RR: 18 (24 Aug 2018 10:31) (16 - 18)  SpO2: 97% (24 Aug 2018 10:29) (94% - 97%)    PHYSICAL EXAM:    General: well appearing, sitting in chair, and in no acute distress  Skin: (+) venous stasis changes to BLE  HEENT: normocephalic, atraumatic, PERRL, anicteric sclera; no conjunctival pallor, mucus membranes moist  Neck: supple, no JVD  Cardiovascular: +S1/S2, regular rate and rhythm; no murmurs, no rub, no gallop  Respiratory: clear to auscultation bilaterally, decreased breath sounds B/l. no rhonchi, no wheeze, no rales  Gastrointestinal: soft, active bowel sounds, non-tender, non-distended  : (+) maddox in place draining hematuria  Extremities: Warm, dry; no clubbing or cyanosis, (+) 4+ pitting edema to BLE predominantly from feet up to the knees but also mid thigh edema noted though not as severe. No tenderness. (+) L elbow w/ soft bulbous mass without any tenderness to palpation  Lymph nodes: (+) large palpable L supraclavicular LAD, (+) L axillary and b/l inguinal LAD  Vascular: 2+ radial, DP pulses bilaterally  Neurological: AAOx3; no focal deficits                            8.1    6.1   )-----------( 186      ( 24 Aug 2018 08:08 )             24.8         CBC Full  -  ( 24 Aug 2018 08:08 )  WBC Count : 6.1 K/uL  Hemoglobin : 8.1 g/dL  Hematocrit : 24.8 %  Platelet Count - Automated : 186 K/uL  Mean Cell Volume : 99.2 fL  Mean Cell Hemoglobin : 32.4 pg  Mean Cell Hemoglobin Concentration : 32.7 g/dL  Auto Neutrophil # : x  Auto Lymphocyte # : x  Auto Monocyte # : x  Auto Eosinophil # : x  Auto Basophil # : x  Auto Neutrophil % : x  Auto Lymphocyte % : x  Auto Monocyte % : x  Auto Eosinophil % : x  Auto Basophil % : x        08-24    139  |  104  |  37<H>  ----------------------------<  98  4.9   |  21<L>  |  2.01<H>    Ca    8.5      24 Aug 2018 08:08  Phos  5.1     08-23  Mg     1.7     08-24          PT/INR - ( 24 Aug 2018 08:08 )   PT: 11.9 sec;   INR: 1.07          PTT - ( 24 Aug 2018 08:08 )  PTT:26.4 sec    < from: NM PET/CT Onc FDG Skull to Thigh, Inital (08.21.18 @ 11:30) >  Findings: There is extensive FDG avid lymphadenopathy. There is   hypermetabolic left cervical, supraclavicular, axillary, and   para-aortic/retroperitoneal lymphadenopathy in the abdomen and pelvis.   There is also hypermetabolic lymphadenopathy extending along the internal   and external inguinal lymph node groups.    Increased FDG activity is also noted in the left posterior fifth rib,   short segments of multiple other ribs, right scapula, right L5 pedicle.    Irregular FDG activity is also seen throughout the right kidney in a   pattern suggesting infiltration of the renal parenchyma.    There is a 2.0 cm sialolith in the right submandibular gland.    The heart is normal in size.  No pericardial effusion is seen.     Small bilateral pleural effusions are seen, right greater than left with   compressive atelectasis of the right basal lung zone.     Images of the upper abdomen demonstrate no focal hepatic abnormalities.     No radiopaque stones are seen in the gallbladder.  The pancreas is normal   in appearance.  No splenic abnormalities are seen.    2.5 cm right adrenal nodule. Left adrenal gland is unremarkable. There   has been interval placement of bilateral ureteral stents with improvement   in bilateral hydronephrosis. Noabdominal aortic aneurysm is seen. No   lymphadenopathy is seen.     Evaluation of the bowel demonstrates no abnormalities. There is pelvic   ascites.    Images of the pelvis demonstrate normal prostate.    Impression: Extensive bulky hypermetabolic lymphadenopathy in the neck,   chest, abdomen and pelvis. Findings consistent with lymphoma. There is   also involvement of osseous structures and probably the right kidney.    < end of copied text >

## 2018-08-24 NOTE — PROGRESS NOTE ADULT - ASSESSMENT
58M presenting with bilateral hydronephrosis secondary to pelvic lymphadenopathy s/p bilateral ureteral stent placement.   -Cr downtrending  -Incidentally discovered prostate adenocarcinoma on bone marrow biopsy with total , free PSA >50  -recommend NM bone scan  -will need Dagrelix and Docetaxel, f/u heme/onc as well  -if Dagrelix unavailable, can start Casodex today

## 2018-08-24 NOTE — BRIEF OPERATIVE NOTE - OPERATION/FINDINGS
Conglomerate of lymph nodes in left level V. Incisional biopsy performed, left supraclavicular specimen sent fresh to pathology
No bladder lesions.  No stricture within ureter.

## 2018-08-24 NOTE — PROGRESS NOTE ADULT - SUBJECTIVE AND OBJECTIVE BOX
ENT POST-OP CHECK    [id number] s/p incisional biopsy of left level V node    S: Pt awake and alert resting comfortaby in bed.   Mild incisional pain, expected postop. Pain controlled. Pt denies N/V, SOB.        O:   T(C): --  HR: --  BP: --  RR: --  SpO2: --      Gen: NAD. A+Ox3.  Incision c/d/i, soft, flat, no fluid collection or hematoma  Nonlabored respirations VANNESSA PETER          A/P: 58yMale now POD#0 s/p incisional biopsy of left level V node, doing well.  - Pain control  - Anti-emetics  - Regular diet, advance as tolerated  - Okay for discharge home today  - Would recommend 7 day course of Augmentin  - d/w attending

## 2018-08-24 NOTE — PROGRESS NOTE ADULT - SUBJECTIVE AND OBJECTIVE BOX
OVERNIGHT EVENTS: Seroquil 25mg for insomnia.    SUBJECTIVE / INTERVAL HPI: Patient seen and examined at bedside. Patient complaining of lower extremity swelling, hematuria, pain with urination. Denies chest pain, SOB, fevers, chills, night sweats, n/v/d/c.    VITAL SIGNS:  Vital Signs Last 24 Hrs  T(C): 37.1 (24 Aug 2018 15:07), Max: 37.1 (24 Aug 2018 15:07)  T(F): 98.7 (24 Aug 2018 15:07), Max: 98.7 (24 Aug 2018 15:07)  HR: 82 (24 Aug 2018 15:45) (81 - 96)  BP: 134/79 (24 Aug 2018 15:45) (119/75 - 134/79)  BP(mean): 98 (24 Aug 2018 15:45) (94 - 98)  RR: 12 (24 Aug 2018 15:45) (12 - 22)  SpO2: 100% (24 Aug 2018 15:45) (94% - 100%)    PHYSICAL EXAM:    General: Resting comfortably in bed, NAD. Red fruit punch colored urine appreciated in bedside urinal.   HEENT: PERRL, anicteric sclera; MMM  Neck: cervical lymphadenopathy appreciated in supraclavicular area on right and submandibular on the left.  Cardiovascular: +S1/S2, RRR  Respiratory: CTA b/l. No wheezing, no rales, no rhonchi. Some diminished breath sounds at bilateral bases.  Gastrointestinal: soft, NT/ND; +BSx4  Extremities: 2-3+ pitting edema in b/l lower extremities, nontender. No erythema, no cyanosis b/l  Vascular: 2+ radial, 1+ dorsalis pedis pulses b/l  Neurological: AAOx3; no focal deficits    MEDICATIONS:  MEDICATIONS  (STANDING):  allopurinol 300 milliGRAM(s) Oral daily  lactated ringers. 1000 milliLiter(s) (75 mL/Hr) IV Continuous <Continuous>  melatonin 5 milliGRAM(s) Oral at bedtime  senna 2 Tablet(s) Oral at bedtime  sodium chloride 0.9%. 1000 milliLiter(s) (75 mL/Hr) IV Continuous <Continuous>    MEDICATIONS  (PRN):  acetaminophen    Suspension. 650 milliGRAM(s) Oral every 6 hours PRN Mild Pain (1 - 3)  diphenhydrAMINE   Capsule 50 milliGRAM(s) Oral every 4 hours PRN Rash and/or Itching  morphine  - Injectable 4 milliGRAM(s) IV Push every 15 minutes PRN Severe Pain  ondansetron Injectable 4 milliGRAM(s) IV Push every 6 hours PRN Nausea  oxybutynin 5 milliGRAM(s) Oral every 8 hours PRN Bladder spasms      ALLERGIES:  Allergies    No Known Allergies    Intolerances        LABS:                        8.1    6.1   )-----------( 186      ( 24 Aug 2018 08:08 )             24.8     08-24    139  |  104  |  37<H>  ----------------------------<  98  4.9   |  21<L>  |  2.01<H>    Ca    8.5      24 Aug 2018 08:08  Phos  5.1     08-23  Mg     1.7     08-24      PT/INR - ( 24 Aug 2018 08:08 )   PT: 11.9 sec;   INR: 1.07          PTT - ( 24 Aug 2018 08:08 )  PTT:26.4 sec    CAPILLARY BLOOD GLUCOSE          RADIOLOGY & ADDITIONAL TESTS: Reviewed.

## 2018-08-24 NOTE — PROGRESS NOTE ADULT - SUBJECTIVE AND OBJECTIVE BOX
SUBJECTIVE: Pt seen and examined at bedside. No overnight events.     Vital Signs Last 24 Hrs  T(C): 36.7 (24 Aug 2018 05:45), Max: 36.8 (23 Aug 2018 11:15)  T(F): 98.1 (24 Aug 2018 05:45), Max: 98.3 (23 Aug 2018 11:15)  HR: 86 (24 Aug 2018 05:45) (81 - 100)  BP: 128/81 (24 Aug 2018 05:45) (121/69 - 138/87)  BP(mean): --  RR: 16 (24 Aug 2018 05:45) (16 - 16)  SpO2: 96% (24 Aug 2018 05:45) (94% - 98%)    PHYSICAL EXAM    Gen: NAD  Abd: Soft, NT/ND    I&O's Detail    23 Aug 2018 07:01  -  24 Aug 2018 07:00  --------------------------------------------------------  IN:    sodium chloride 0.9%.: 1500 mL  Total IN: 1500 mL    OUT:    Voided: 2700 mL  Total OUT: 2700 mL    Total NET: -1200 mL          LABS:                        8.1    6.1   )-----------( 186      ( 24 Aug 2018 08:08 )             24.8     08-24    139  |  104  |  37<H>  ----------------------------<  98  4.9   |  21<L>  |  2.01<H>    Ca    8.5      24 Aug 2018 08:08  Phos  5.1     08-23  Mg     1.7     08-24      PT/INR - ( 24 Aug 2018 08:08 )   PT: 11.9 sec;   INR: 1.07          PTT - ( 24 Aug 2018 08:08 )  PTT:26.4 sec      MEDICATIONS  (STANDING):  allopurinol 300 milliGRAM(s) Oral daily  magnesium sulfate  IVPB 2 Gram(s) IV Intermittent once  melatonin 5 milliGRAM(s) Oral at bedtime  senna 2 Tablet(s) Oral at bedtime  sodium chloride 0.9%. 1000 milliLiter(s) (75 mL/Hr) IV Continuous <Continuous>    MEDICATIONS  (PRN):  acetaminophen    Suspension. 650 milliGRAM(s) Oral every 6 hours PRN Mild Pain (1 - 3)  diphenhydrAMINE   Capsule 50 milliGRAM(s) Oral every 4 hours PRN Rash and/or Itching  oxybutynin 5 milliGRAM(s) Oral every 8 hours PRN Bladder spasms      RADIOLOGY & ADDITIONAL STUDIES:    ASSESSMENT AND PLAN

## 2018-08-24 NOTE — PROGRESS NOTE ADULT - ASSESSMENT
57 yo M presenting with b/l lower ext edema and diffuse lymphadenopathy apparent on CT imaging, concerning for underlying lymphoproliferative disorder and incidental finding of prostate cancer w/ mets to bone on bone marrow biopsy    # Lymphadenopathy  - CT w/ evidence diffuse LAD in left supraclavicular region, left axilla, posterior mediastinal and retroperitoneum with bulky extensive periaortic and interaortocaval lymphadenopathy concerning for lymphoproliferative disorder. elevated LDH and uric acid consistent with underlying highly proliferative malignancy, haptoglobin normal- no evidence of hemolysis. Peripheral smear shows atypical lymphocytes.  - PET/CT shows Extensive bulky hypermetabolic lymphadenopathy in the neck, chest, abdomen and pelvis. Findings consistent with lymphoma. There is also involvement of osseous structures and probably the right kidney.  - s/p bx with ENT of supraclavicular LAD, this was an incisional bx which may be suboptimal. will discuss with pathology.   - Hep B: non reactive. Hep C: reactive (consistent w/ history)- viral load high  - perform 2D Echocardiogram- performed, showed normal EF  - urology consult for obstructive nephropathy 2/2 to bulky RP LAD: now s/p B/l ureteral stents, continues to have hematuria- heparin gtt stopped  - bone marrow bx performed 8/20 prelim result showed no evidence of lymphoma involvement but does show that there is carcinoma present, IHC stains suggestive of prostate adenocarcinoma      # Prostate adenocarcinoma  - incidental finding from pathology of bone marrow biopsy, PSA level 750, testosterone level pending.  - nuclear medicine bone scan recommended to clearly delineate extent of bony mets. On PET/CT, there is no evidence of visceral mets (lung/liver). PET/CT is not ideal imaging modality to assess for prostate cancer staging.   - will hold off on starting hormonal therapy w/ casodex + ADT until tissue diagnosis of supraclavicular LAD is available. For pt's hormone naive metastate prostate cancer, pt will therapy with ADT + docetaxel based on STAMPEDE/CHARTED trial or ADT + abiraterone based on LATITUDE study. we will discuss with pt, his optios as an outpt.  - It is likely pt has two different malignancies going on at same time (lymphoma + prostate cancer)    # DVT  - LE dopplers show thrombosis of a left calf muscular vein, hold of on lovenox therapy due to renal failure and need for excisional bx for dx of lymphoma  - heparin gtt on hold due to drop in H&H from ongoing hematuria.    # -normocytic anemia, peripheral smear reviewed  - etiology likely dilutional + chronic hematuria while on hep gtt, iron studies, Vit B12 and folate levels adequate  - trend cbc, transfuse for Hgb <7  - discuss with urology whether ongoing hematuria despite being off heparin gtt is 2/2 to a mechanical urological issue. 59 yo M presenting with b/l lower ext edema and diffuse lymphadenopathy apparent on CT imaging, concerning for underlying lymphoproliferative disorder and incidental finding of prostate cancer w/ mets to bone on bone marrow biopsy    # Lymphadenopathy  - CT w/ evidence diffuse LAD in left supraclavicular region, left axilla, posterior mediastinal and retroperitoneum with bulky extensive periaortic and interaortocaval lymphadenopathy concerning for lymphoproliferative disorder. elevated LDH and uric acid consistent with underlying highly proliferative malignancy, haptoglobin normal- no evidence of hemolysis. Peripheral smear shows atypical lymphocytes.  - PET/CT shows Extensive bulky hypermetabolic lymphadenopathy in the neck, chest, abdomen and pelvis. Findings consistent with lymphoma. There is also involvement of osseous structures and probably the right kidney.  - s/p bx with ENT of supraclavicular LAD, this was an incisional bx which may be suboptimal. will discuss with pathology.   - Hep B: non reactive. Hep C: reactive (consistent w/ history)- viral load high  - perform 2D Echocardiogram- performed, showed normal EF  - urology consult for obstructive nephropathy 2/2 to bulky RP LAD: now s/p B/l ureteral stents, continues to have hematuria- heparin gtt stopped  - bone marrow bx performed 8/20 prelim result showed no evidence of lymphoma involvement but does show that there is carcinoma present, IHC stains suggestive of prostate adenocarcinoma      # Prostate adenocarcinoma  - incidental finding from pathology of bone marrow biopsy, PSA level 750, testosterone level pending.  - nuclear medicine bone scan recommended to clearly delineate extent of bony mets. On PET/CT, there is no evidence of visceral mets (lung/liver). PET/CT is not ideal imaging modality to assess for prostate cancer staging.   - will hold off on starting hormonal therapy w/ casodex + ADT until tissue diagnosis of supraclavicular LAD is available. For pt's hormone naive metastate prostate cancer, pt will therapy with ADT + docetaxel based on STAMPEDE/CHARTED trial or ADT + abiraterone based on LATITUDE study. we will discuss with pt, his options as an outpt.  - It is likely pt has two different malignancies going on at same time (lymphoma + prostate cancer)    # DVT  - LE dopplers show thrombosis of a left calf muscular vein, hold of on lovenox therapy due to renal failure and need for excisional bx for dx of lymphoma  - heparin gtt on hold due to drop in H&H from ongoing hematuria.    # -normocytic anemia, peripheral smear reviewed  - etiology likely dilutional + chronic hematuria while on hep gtt, iron studies, Vit B12 and folate levels adequate  - trend cbc, transfuse for Hgb <7  - discuss with urology whether ongoing hematuria despite being off heparin gtt is 2/2 to a mechanical urological issue.

## 2018-08-25 DIAGNOSIS — R31.9 HEMATURIA, UNSPECIFIED: ICD-10-CM

## 2018-08-25 DIAGNOSIS — C61 MALIGNANT NEOPLASM OF PROSTATE: ICD-10-CM

## 2018-08-25 LAB
ANION GAP SERPL CALC-SCNC: 15 MMOL/L — SIGNIFICANT CHANGE UP (ref 5–17)
BUN SERPL-MCNC: 32 MG/DL — HIGH (ref 7–23)
CALCIUM SERPL-MCNC: 8.9 MG/DL — SIGNIFICANT CHANGE UP (ref 8.4–10.5)
CHLORIDE SERPL-SCNC: 99 MMOL/L — SIGNIFICANT CHANGE UP (ref 96–108)
CO2 SERPL-SCNC: 21 MMOL/L — LOW (ref 22–31)
CREAT SERPL-MCNC: 1.74 MG/DL — HIGH (ref 0.5–1.3)
GLUCOSE SERPL-MCNC: 119 MG/DL — HIGH (ref 70–99)
HCT VFR BLD CALC: 28.2 % — LOW (ref 39–50)
HGB BLD-MCNC: 9.3 G/DL — LOW (ref 13–17)
MAGNESIUM SERPL-MCNC: 1.9 MG/DL — SIGNIFICANT CHANGE UP (ref 1.6–2.6)
MCHC RBC-ENTMCNC: 32.5 PG — SIGNIFICANT CHANGE UP (ref 27–34)
MCHC RBC-ENTMCNC: 33 G/DL — SIGNIFICANT CHANGE UP (ref 32–36)
MCV RBC AUTO: 98.6 FL — SIGNIFICANT CHANGE UP (ref 80–100)
PHOSPHATE SERPL-MCNC: 4.3 MG/DL — SIGNIFICANT CHANGE UP (ref 2.5–4.5)
PLATELET # BLD AUTO: 215 K/UL — SIGNIFICANT CHANGE UP (ref 150–400)
POTASSIUM SERPL-MCNC: 5.2 MMOL/L — SIGNIFICANT CHANGE UP (ref 3.5–5.3)
POTASSIUM SERPL-SCNC: 5.2 MMOL/L — SIGNIFICANT CHANGE UP (ref 3.5–5.3)
RBC # BLD: 2.86 M/UL — LOW (ref 4.2–5.8)
RBC # FLD: 11.9 % — SIGNIFICANT CHANGE UP (ref 10.3–16.9)
SODIUM SERPL-SCNC: 135 MMOL/L — SIGNIFICANT CHANGE UP (ref 135–145)
WBC # BLD: 6.8 K/UL — SIGNIFICANT CHANGE UP (ref 3.8–10.5)
WBC # FLD AUTO: 6.8 K/UL — SIGNIFICANT CHANGE UP (ref 3.8–10.5)

## 2018-08-25 PROCEDURE — 78306 BONE IMAGING WHOLE BODY: CPT | Mod: 26

## 2018-08-25 PROCEDURE — 99232 SBSQ HOSP IP/OBS MODERATE 35: CPT

## 2018-08-25 RX ORDER — QUETIAPINE FUMARATE 200 MG/1
25 TABLET, FILM COATED ORAL AT BEDTIME
Qty: 0 | Refills: 0 | Status: DISCONTINUED | OUTPATIENT
Start: 2018-08-25 | End: 2018-08-27

## 2018-08-25 RX ADMIN — Medication 5 MILLIGRAM(S): at 22:10

## 2018-08-25 RX ADMIN — Medication 300 MILLIGRAM(S): at 12:20

## 2018-08-25 RX ADMIN — SENNA PLUS 2 TABLET(S): 8.6 TABLET ORAL at 22:10

## 2018-08-25 RX ADMIN — SODIUM CHLORIDE 75 MILLILITER(S): 9 INJECTION INTRAMUSCULAR; INTRAVENOUS; SUBCUTANEOUS at 19:04

## 2018-08-25 RX ADMIN — Medication 650 MILLIGRAM(S): at 23:00

## 2018-08-25 RX ADMIN — QUETIAPINE FUMARATE 25 MILLIGRAM(S): 200 TABLET, FILM COATED ORAL at 22:10

## 2018-08-25 RX ADMIN — Medication 650 MILLIGRAM(S): at 22:12

## 2018-08-25 NOTE — PROGRESS NOTE ADULT - SUBJECTIVE AND OBJECTIVE BOX
Internal Medicine Hospitalist Progress Note    Patient is a 58y old  Male who presents with a chief complaint of b/l leg swelling w diffuse LAD b/l hydronephrosis and incidental finding of prostate adenoca      INTERVAL HPI/OVERNIGHT EVENTS: no events overnight, no fevers or chills. less blood in urine- clearing up  plan for bone scan this morning.     Review of Systems: 12 point review of systems otherwise negative  ( - )fevers/chills  ( - ) dyspnea  ( - ) cough  ( - ) chest pain  ( - ) palpatations  ( - ) dizziness/lightheadedness  ( - ) nausea/vomiting  ( - ) abd pain  ( - ) diarrhea  ( - ) melena  ( - ) hematochezia  ( - ) dysuria  ( + ) hematuria  ( - ) leg swelling  ( -) calf tenderness  ( - ) motor weakness  ( - ) extremity numbness  ( - ) back pain  ( + ) tolerating POs  ( + ) BM    MEDICATIONS  (STANDING):  allopurinol 300 milliGRAM(s) Oral daily  melatonin 5 milliGRAM(s) Oral at bedtime  senna 2 Tablet(s) Oral at bedtime  sodium chloride 0.9%. 1000 milliLiter(s) (75 mL/Hr) IV Continuous <Continuous>    MEDICATIONS  (PRN):  acetaminophen    Suspension. 650 milliGRAM(s) Oral every 6 hours PRN Mild Pain (1 - 3)  diphenhydrAMINE   Capsule 50 milliGRAM(s) Oral every 4 hours PRN Rash and/or Itching  morphine  - Injectable 4 milliGRAM(s) IV Push every 15 minutes PRN Severe Pain  ondansetron Injectable 4 milliGRAM(s) IV Push every 6 hours PRN Nausea  oxybutynin 5 milliGRAM(s) Oral every 8 hours PRN Bladder spasms      Allergies    No Known Allergies    Intolerances          Vital Signs Last 24 Hrs  T(C): 36.6 (25 Aug 2018 11:05), Max: 37.1 (24 Aug 2018 15:07)  T(F): 97.9 (25 Aug 2018 11:05), Max: 98.7 (24 Aug 2018 15:07)  HR: 88 (25 Aug 2018 11:05) (78 - 96)  BP: 132/82 (25 Aug 2018 11:05) (120/74 - 136/74)  BP(mean): 98 (24 Aug 2018 15:45) (94 - 98)  RR: 16 (25 Aug 2018 11:05) (12 - 22)  SpO2: 96% (25 Aug 2018 11:05) (94% - 100%)  CAPILLARY BLOOD GLUCOSE    General: well appearing, sitting in chair, and in no acute distress  Skin: (+) venous stasis changes to BLE  HEENT: normocephalic, atraumatic, PERRL, anicteric sclera; no conjunctival pallor, mucus membranes moist  Neck: supple, no JVD  Cardiovascular: +S1/S2, regular rate and rhythm; no murmurs, no rub, no gallop  Respiratory: clear to auscultation bilaterally, decreased breath sounds B/l. no rhonchi, no wheeze, no rales  Gastrointestinal: soft, active bowel sounds, non-tender, non-distended  : (+) maddox in place draining hematuria  Extremities: Warm, dry; no clubbing or cyanosis, (+) 4+ pitting edema to BLE predominantly from feet up to the knees but also mid thigh edema noted though not as severe. No tenderness. (+) L elbow w/ soft bulbous mass without any tenderness to palpation  Lymph nodes: (+) large palpable L supraclavicular LAD, (+) L axillary and b/l inguinal LAD. s/p bx of L supraclavicular LAD- incision c/d/i- no erythema  Vascular: 2+ radial, DP pulses bilaterally  Neurological: AAOx3; no focal deficits      08-24 @ 07:01  -  08-25 @ 07:00  --------------------------------------------------------  IN: 1575 mL / OUT: 2500 mL / NET: -925 mL    08-25 @ 07:01  -  08-25 @ 13:25  --------------------------------------------------------  IN: 75 mL / OUT: 1400 mL / NET: -1325 mL        LABS:                        9.3    6.8   )-----------( 215      ( 25 Aug 2018 10:48 )             28.2     08-25    135  |  99  |  32<H>  ----------------------------<  119<H>  5.2   |  21<L>  |  1.74<H>    Ca    8.9      25 Aug 2018 10:48  Phos  4.3     08-25  Mg     1.9     08-25      PT/INR - ( 24 Aug 2018 08:08 )   PT: 11.9 sec;   INR: 1.07          PTT - ( 24 Aug 2018 08:08 )  PTT:26.4 sec        RADIOLOGY & ADDITIONAL TESTS:    ---------------------------------------------------------------------------  I personally reviewed: [  ]EKG   [  ]CXR    [  ] CT    [  ]Other  ---------------------------------------------------------------------------  PLEASE CHECK WHEN PRESENT:     [  ]Heart Failure     [  ] Acute     [  ] Acute on Chronic     [  ] Chronic  -------------------------------------------------------------------     [  ]Diastolic [HFpEF]     [  ]Systolic [HFrEF]     [  ]Combined [HFpEF & HFrEF]     [  ]Other:  -------------------------------------------------------------------  [  ]KIRSTEN     [  ]ATN     [  ]Reneal Medullary Necrosis     [  ]Renal Cortical Necrosis     [  ]Other Pathological Lesions:    [  ]CKD 1  [  ]CKD 2  [  ]CKD 3  [  ]CKD 4  [  ]CKD 5  [  ]Other  -------------------------------------------------------------------  [  ]Other/Unspecified:    --------------------------------------------------------------------    Abdominal Nutritional Status  [  ]Malnutrition: See Nutrition Note  [  ]Cachexia  [  ]Other:   [  ]Supplement Ordered:  [  ]Morbid Obesity (BMI >=40]

## 2018-08-25 NOTE — PROGRESS NOTE ADULT - SUBJECTIVE AND OBJECTIVE BOX
Seen at bedside. No difficulty with urination, no flank pain.  Still with diffuse LE edema  Urine at bedside appears concentrated but not bloody, low concern for gross hematuria.    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  -Nuclear bone scan  -Would strongly advise initiation of androgen deprivation therapy while in house, can start dagarelix now, or can start bicalutamide with plan for lupron injection after 7 days - discussed with patient risks of decrease libido, hot flashes, decreased energy  -Would also recommend upfront docetaxel or enzalutamide, abiraterone may be a less desirable option given diffuse edema, will defer to oncology for administration    2) B/l Hydronephrosis, KIRSTEN - slowly improving, hematuria resolved  -Trend Cr.  -F/up US  -Consider Nephrology c/s  -Will follow Seen at bedside. No difficulty with urination, no flank pain.  Still with diffuse LE edema  Urine at bedside appears concentrated but not bloody, low concern for gross hematuria.    A/P:    1) Metastatic castrate sensitive prostate cancer likely causing diffuse RP lymphadenopathy, bone lesions, second malignancy less likely however agree with waiting to start chemotherapy until cervical biopsy results.  -Nuclear bone scan  -Would strongly advise initiation of androgen deprivation therapy while in house, can start dagarelix now, or can start bicalutamide with plan for lupron injection after 7 days - discussed with patient risks of decrease libido, hot flashes, decreased energy  -Would also recommend upfront docetaxel or enzalutamide, abiraterone may be a less desirable option given diffuse edema, will defer to oncology for administration    2) B/l Hydronephrosis, KIRSTEN - slowly improving, hematuria resolved  -Trend Cr.  -F/up US  -Consider Nephrology c/s  -Will follow

## 2018-08-25 NOTE — PROGRESS NOTE ADULT - ASSESSMENT
57 yo old male presenting with b/l lower extremity edema and b/l hydronephrosis s/p b/l stenting by urology, likely due to bulky lymphadenopathy from metastatic prostate CA.

## 2018-08-25 NOTE — PROGRESS NOTE ADULT - PROBLEM SELECTOR PROBLEM 10
Transition of care performed with sharing of clinical summary

## 2018-08-25 NOTE — PROGRESS NOTE ADULT - ASSESSMENT
59 yo M presenting with b/l lower ext edema and diffuse LAD w/ b/l hydronephrosis, hematuria, DVT and incidental finding of metastatic prostate adenoca

## 2018-08-25 NOTE — PROGRESS NOTE ADULT - SUBJECTIVE AND OBJECTIVE BOX
OVERNIGHT EVENTS:    SUBJECTIVE / INTERVAL HPI: Patient seen and examined at bedside.     VITAL SIGNS:  Vital Signs Last 24 Hrs  T(C): 36.8 (25 Aug 2018 19:07), Max: 36.9 (25 Aug 2018 05:32)  T(F): 98.3 (25 Aug 2018 19:07), Max: 98.4 (25 Aug 2018 05:32)  HR: 86 (25 Aug 2018 19:07) (85 - 88)  BP: 134/81 (25 Aug 2018 19:07) (130/78 - 134/81)  BP(mean): --  RR: 16 (25 Aug 2018 19:07) (16 - 16)  SpO2: 95% (25 Aug 2018 19:07) (94% - 96%)    PHYSICAL EXAM:    General: WDWN  HEENT: NC/AT; PERRL, anicteric sclera; MMM  Neck: supple  Cardiovascular: +S1/S2, RRR  Respiratory: CTA B/L; no W/R/R  Gastrointestinal: soft, NT/ND; +BSx4  Extremities: WWP; no edema, clubbing or cyanosis  Vascular: 2+ radial, DP/PT pulses B/L  Neurological: AAOx3; no focal deficits    MEDICATIONS:  MEDICATIONS  (STANDING):  allopurinol 300 milliGRAM(s) Oral daily  melatonin 5 milliGRAM(s) Oral at bedtime  senna 2 Tablet(s) Oral at bedtime  sodium chloride 0.9%. 1000 milliLiter(s) (75 mL/Hr) IV Continuous <Continuous>    MEDICATIONS  (PRN):  acetaminophen    Suspension. 650 milliGRAM(s) Oral every 6 hours PRN Mild Pain (1 - 3)  diphenhydrAMINE   Capsule 50 milliGRAM(s) Oral every 4 hours PRN Rash and/or Itching  morphine  - Injectable 4 milliGRAM(s) IV Push every 15 minutes PRN Severe Pain  ondansetron Injectable 4 milliGRAM(s) IV Push every 6 hours PRN Nausea  oxybutynin 5 milliGRAM(s) Oral every 8 hours PRN Bladder spasms  QUEtiapine 25 milliGRAM(s) Oral at bedtime PRN Insomnia      ALLERGIES:  Allergies    No Known Allergies    Intolerances        LABS:                        9.3    6.8   )-----------( 215      ( 25 Aug 2018 10:48 )             28.2     08-25    135  |  99  |  32<H>  ----------------------------<  119<H>  5.2   |  21<L>  |  1.74<H>    Ca    8.9      25 Aug 2018 10:48  Phos  4.3     08-25  Mg     1.9     08-25      PT/INR - ( 24 Aug 2018 08:08 )   PT: 11.9 sec;   INR: 1.07          PTT - ( 24 Aug 2018 08:08 )  PTT:26.4 sec    CAPILLARY BLOOD GLUCOSE          RADIOLOGY & ADDITIONAL TESTS: Reviewed. OVERNIGHT EVENTS: Seroquil 25mg for insomnia    SUBJECTIVE / INTERVAL HPI: Patient seen and examined at bedside. Admits to hematuria with clots although describes less deep red than yesterday. Denied fever, chills, night sweets, Chest pain, SOB, n/v/d/c.      VITAL SIGNS:  Vital Signs Last 24 Hrs  T(C): 36.8 (25 Aug 2018 19:07), Max: 36.9 (25 Aug 2018 05:32)  T(F): 98.3 (25 Aug 2018 19:07), Max: 98.4 (25 Aug 2018 05:32)  HR: 86 (25 Aug 2018 19:07) (85 - 88)  BP: 134/81 (25 Aug 2018 19:07) (130/78 - 134/81)  BP(mean): --  RR: 16 (25 Aug 2018 19:07) (16 - 16)  SpO2: 95% (25 Aug 2018 19:07) (94% - 96%)    PHYSICAL EXAM:    General: Resting comfortably in bed, NAD. Hematuria less red today, more transparent.   HEENT: PERRL, anicteric sclera; MMM  Neck: cervical lymphadenopathy appreciated in supraclavicular area on right and submandibular on the left.  Cardiovascular: +S1/S2, RRR  Respiratory: CTA b/l. No wheezing, no rales, no rhonchi. Some diminished breath sounds at bilateral bases.  Gastrointestinal: soft, NT/ND; +BSx4  Extremities: 2-3+ pitting edema in b/l lower extremities, nontender. No erythema, no cyanosis b/l  Vascular: 2+ radial, 1+ dorsalis pedis pulses b/l  Neurological: AAOx3; no focal deficits    MEDICATIONS:  MEDICATIONS  (STANDING):  allopurinol 300 milliGRAM(s) Oral daily  melatonin 5 milliGRAM(s) Oral at bedtime  senna 2 Tablet(s) Oral at bedtime  sodium chloride 0.9%. 1000 milliLiter(s) (75 mL/Hr) IV Continuous <Continuous>    MEDICATIONS  (PRN):  acetaminophen    Suspension. 650 milliGRAM(s) Oral every 6 hours PRN Mild Pain (1 - 3)  diphenhydrAMINE   Capsule 50 milliGRAM(s) Oral every 4 hours PRN Rash and/or Itching  morphine  - Injectable 4 milliGRAM(s) IV Push every 15 minutes PRN Severe Pain  ondansetron Injectable 4 milliGRAM(s) IV Push every 6 hours PRN Nausea  oxybutynin 5 milliGRAM(s) Oral every 8 hours PRN Bladder spasms  QUEtiapine 25 milliGRAM(s) Oral at bedtime PRN Insomnia      ALLERGIES:  Allergies    No Known Allergies    Intolerances        LABS:                        9.3    6.8   )-----------( 215      ( 25 Aug 2018 10:48 )             28.2     08-25    135  |  99  |  32<H>  ----------------------------<  119<H>  5.2   |  21<L>  |  1.74<H>    Ca    8.9      25 Aug 2018 10:48  Phos  4.3     08-25  Mg     1.9     08-25      PT/INR - ( 24 Aug 2018 08:08 )   PT: 11.9 sec;   INR: 1.07          PTT - ( 24 Aug 2018 08:08 )  PTT:26.4 sec    CAPILLARY BLOOD GLUCOSE          RADIOLOGY & ADDITIONAL TESTS: Reviewed.

## 2018-08-25 NOTE — PROGRESS NOTE ADULT - PROBLEM SELECTOR PLAN 9
Monitor lytes and replete as needed.  Diet Renal/Dash diet. NPO Midnight   DVT prophylaxis: At this time pt to be on Heparin drip.
F: IV NS @ 75cc/hr  E: Monitor lytes and replete as needed.  N: Advance diet as tolerated after biopsy
F: IV NS @ 75cc/hr  E: Monitor lytes and replete as needed.  N: Diet Renal diet  DVT : Heparin drip @ 20cc/hr
F: IV NS @ 75cc/hr. D/c if creatinine improving  E: Monitor lytes and replete as needed.  N: Renal diet
Monitor lytes and replete as needed.  Diet Renal/Dash diet. NPO Midnight   DVT prophylaxis: At this time pt to be on Heparin drip.
F: IV NS @ 75cc/hr  E: Monitor lytes and replete as needed.  N: Diet Renal diet. NPO after midnight

## 2018-08-26 LAB
ANION GAP SERPL CALC-SCNC: 13 MMOL/L — SIGNIFICANT CHANGE UP (ref 5–17)
BLD GP AB SCN SERPL QL: NEGATIVE — SIGNIFICANT CHANGE UP
BUN SERPL-MCNC: 27 MG/DL — HIGH (ref 7–23)
CALCIUM SERPL-MCNC: 8.7 MG/DL — SIGNIFICANT CHANGE UP (ref 8.4–10.5)
CHLORIDE SERPL-SCNC: 102 MMOL/L — SIGNIFICANT CHANGE UP (ref 96–108)
CO2 SERPL-SCNC: 21 MMOL/L — LOW (ref 22–31)
CREAT SERPL-MCNC: 1.65 MG/DL — HIGH (ref 0.5–1.3)
GLUCOSE SERPL-MCNC: 91 MG/DL — SIGNIFICANT CHANGE UP (ref 70–99)
HCT VFR BLD CALC: 25.5 % — LOW (ref 39–50)
HGB BLD-MCNC: 8.3 G/DL — LOW (ref 13–17)
MAGNESIUM SERPL-MCNC: 1.7 MG/DL — SIGNIFICANT CHANGE UP (ref 1.6–2.6)
MCHC RBC-ENTMCNC: 32 PG — SIGNIFICANT CHANGE UP (ref 27–34)
MCHC RBC-ENTMCNC: 32.5 G/DL — SIGNIFICANT CHANGE UP (ref 32–36)
MCV RBC AUTO: 98.5 FL — SIGNIFICANT CHANGE UP (ref 80–100)
PLATELET # BLD AUTO: 200 K/UL — SIGNIFICANT CHANGE UP (ref 150–400)
POTASSIUM SERPL-MCNC: 5.1 MMOL/L — SIGNIFICANT CHANGE UP (ref 3.5–5.3)
POTASSIUM SERPL-SCNC: 5.1 MMOL/L — SIGNIFICANT CHANGE UP (ref 3.5–5.3)
RBC # BLD: 2.59 M/UL — LOW (ref 4.2–5.8)
RBC # FLD: 12 % — SIGNIFICANT CHANGE UP (ref 10.3–16.9)
RH IG SCN BLD-IMP: POSITIVE — SIGNIFICANT CHANGE UP
SODIUM SERPL-SCNC: 136 MMOL/L — SIGNIFICANT CHANGE UP (ref 135–145)
WBC # BLD: 6.4 K/UL — SIGNIFICANT CHANGE UP (ref 3.8–10.5)
WBC # FLD AUTO: 6.4 K/UL — SIGNIFICANT CHANGE UP (ref 3.8–10.5)

## 2018-08-26 PROCEDURE — 99233 SBSQ HOSP IP/OBS HIGH 50: CPT

## 2018-08-26 PROCEDURE — 76770 US EXAM ABDO BACK WALL COMP: CPT | Mod: 26

## 2018-08-26 RX ORDER — MAGNESIUM SULFATE 500 MG/ML
1 VIAL (ML) INJECTION ONCE
Qty: 0 | Refills: 0 | Status: COMPLETED | OUTPATIENT
Start: 2018-08-26 | End: 2018-08-26

## 2018-08-26 RX ADMIN — Medication 650 MILLIGRAM(S): at 21:19

## 2018-08-26 RX ADMIN — Medication 300 MILLIGRAM(S): at 12:46

## 2018-08-26 RX ADMIN — Medication 50 MILLIGRAM(S): at 21:19

## 2018-08-26 RX ADMIN — QUETIAPINE FUMARATE 25 MILLIGRAM(S): 200 TABLET, FILM COATED ORAL at 22:56

## 2018-08-26 RX ADMIN — Medication 650 MILLIGRAM(S): at 22:00

## 2018-08-26 RX ADMIN — Medication 100 GRAM(S): at 10:58

## 2018-08-26 RX ADMIN — Medication 5 MILLIGRAM(S): at 22:57

## 2018-08-26 RX ADMIN — SODIUM CHLORIDE 75 MILLILITER(S): 9 INJECTION INTRAMUSCULAR; INTRAVENOUS; SUBCUTANEOUS at 09:31

## 2018-08-26 NOTE — PROGRESS NOTE ADULT - PROBLEM SELECTOR PROBLEM 5
Normocytic anemia
Hepatitis C
Hepatitis C
Hydronephrosis, bilateral
Obstructive nephropathy
Hydronephrosis, bilateral
Normocytic anemia

## 2018-08-26 NOTE — PROGRESS NOTE ADULT - SUBJECTIVE AND OBJECTIVE BOX
Internal medicine Hospitalist Progress Note    Patient is a 58y old  Male who presents with a chief complaint of b/l leg swelling (19 Aug 2018 17:55)    INTERVAL HPI/OVERNIGHT EVENTS: no overnight events, urine has cleared up- now yellow in color. no other signs of bleeding. denies any fevers or chills. swelling in legs increasing. ambulating well    Review of Systems: 12 point review of systems otherwise negative  ( - )fevers/chills  ( - ) dyspnea  ( - ) cough  ( - ) chest pain  ( - ) palpatations  ( - ) dizziness/lightheadedness  ( - ) nausea/vomiting  ( - ) abd pain  ( - ) diarrhea  ( - ) melena  ( - ) hematochezia  ( - ) dysuria  ( - ) hematuria  ( - ) leg swelling  ( -) calf tenderness  ( - ) motor weakness  ( - ) extremity numbness  ( - ) back pain  ( + ) tolerating POs  ( + ) BM    MEDICATIONS  (STANDING):  allopurinol 300 milliGRAM(s) Oral daily  melatonin 5 milliGRAM(s) Oral at bedtime  senna 2 Tablet(s) Oral at bedtime    MEDICATIONS  (PRN):  acetaminophen    Suspension. 650 milliGRAM(s) Oral every 6 hours PRN Mild Pain (1 - 3)  diphenhydrAMINE   Capsule 50 milliGRAM(s) Oral every 4 hours PRN Rash and/or Itching  morphine  - Injectable 4 milliGRAM(s) IV Push every 15 minutes PRN Severe Pain  ondansetron Injectable 4 milliGRAM(s) IV Push every 6 hours PRN Nausea  oxybutynin 5 milliGRAM(s) Oral every 8 hours PRN Bladder spasms  QUEtiapine 25 milliGRAM(s) Oral at bedtime PRN Insomnia      Allergies    No Known Allergies    Intolerances          Vital Signs Last 24 Hrs  T(C): 36.7 (26 Aug 2018 10:55), Max: 36.9 (25 Aug 2018 21:30)  T(F): 98 (26 Aug 2018 10:55), Max: 98.4 (25 Aug 2018 21:30)  HR: 93 (26 Aug 2018 10:55) (86 - 93)  BP: 145/85 (26 Aug 2018 10:55) (127/76 - 145/85)  BP(mean): --  RR: 16 (26 Aug 2018 10:55) (16 - 16)  SpO2: 96% (26 Aug 2018 10:55) (95% - 96%)  CAPILLARY BLOOD GLUCOSE          08-25 @ 07:01 - 08-26 @ 07:00  --------------------------------------------------------  IN: 1725 mL / OUT: 5600 mL / NET: -3875 mL    08-26 @ 07:01 - 08-26 @ 14:39  --------------------------------------------------------  IN: 75 mL / OUT: 0 mL / NET: 75 mL  General: well appearing, sitting in chair, and in no acute distress  Skin: (+) venous stasis changes to BLE  HEENT: normocephalic, atraumatic, PERRL, anicteric sclera; no conjunctival pallor, mucus membranes moist  Neck: supple, no JVD  Cardiovascular: +S1/S2, regular rate and rhythm; no murmurs, no rub, no gallop  Respiratory: clear to auscultation bilaterally, decreased breath sounds B/l. no rhonchi, no wheeze, no rales  Gastrointestinal: soft, active bowel sounds, non-tender, non-distended  : (+) maddox in place draining hematuria  Extremities: Warm, dry; no clubbing or cyanosis, (+) 4+ pitting edema to BLE predominantly from feet up to the knees but also mid thigh edema noted though not as severe. No tenderness. (+) L elbow w/ soft bulbous mass without any tenderness to palpation  Lymph nodes: (+) large palpable L supraclavicular LAD, (+) L axillary and b/l inguinal LAD  Vascular: 2+ radial, DP pulses bilaterally  Neurological: AAOx3; no focal deficits  LABS:                        8.3    6.4   )-----------( 200      ( 26 Aug 2018 07:55 )             25.5     08-26    136  |  102  |  27<H>  ----------------------------<  91  5.1   |  21<L>  |  1.65<H>    Ca    8.7      26 Aug 2018 07:55  Phos  4.3     08-25  Mg     1.7     08-26              RADIOLOGY & ADDITIONAL TESTS:    ---------------------------------------------------------------------------  I personally reviewed: [  ]EKG   [  ]CXR    [  ] CT    [  ]Other  ---------------------------------------------------------------------------  PLEASE CHECK WHEN PRESENT:     [  ]Heart Failure     [  ] Acute     [  ] Acute on Chronic     [  ] Chronic  -------------------------------------------------------------------     [  ]Diastolic [HFpEF]     [  ]Systolic [HFrEF]     [  ]Combined [HFpEF & HFrEF]     [  ]Other:  -------------------------------------------------------------------  [  ]KIRSTEN     [  ]ATN     [  ]Reneal Medullary Necrosis     [  ]Renal Cortical Necrosis     [  ]Other Pathological Lesions:    [  ]CKD 1  [  ]CKD 2  [  ]CKD 3  [  ]CKD 4  [  ]CKD 5  [  ]Other  -------------------------------------------------------------------  [  ]Other/Unspecified:    --------------------------------------------------------------------    Abdominal Nutritional Status  [  ]Malnutrition: See Nutrition Note  [  ]Cachexia  [  ]Other:   [  ]Supplement Ordered:  [  ]Morbid Obesity (BMI >=40]

## 2018-08-26 NOTE — PROGRESS NOTE ADULT - PROBLEM SELECTOR PLAN 5
Pt with normocytic anemia on labs. Likely 2/2 possible lymphoma.   -Follow anemia labs. Iron studies, Folic acid levels, Vit b12 levels, Retic.
Hep C Ab positive, Hep viral load elevated, f/u outpt w/ GI/hepatology regarding treatment
Hep C Ab positive, Hep viral load elevated, f/u outpt w/ GI/hepatology regarding treatment
Pt with findings of b/l hydronephrosis. Likely obstructive in nature 2/2 diffuse retroperitoneal lymphadenopathy.   - s/p b/l stent placement by urology  - s/p maddox, passed TOV. Still urinating red fruit punch colored urine.  - Cr improving, Cr 2.39 today  -urology following, recs appreciated. Not recommending acute intervention for hematuria at this time  -f/u repeat retroperitoneal US for monitoring obstructive uropathy in the setting of continued hematuria.  -c/w PO fluids and IV NS @ 75cc/hr  -monitor urinary output  -monitor urine now that patient off heparin gtt    **Findings of Cystitis on CT scan.  Pt with no complaints of suprapubic tenderness or dysuria. Will hold off ABX.
s/p ureteral stents  creat improved  strict i/o  cont ivfs
Pt with findings of b/l hydronephrosis. Likely obstructive in nature 2/2 diffuse retroperitoneal lymphadenopathy.   - s/p b/l stent placement by urology  - s/p maddox, passed TOV. Hematuria improving but still present  - Cr improving, Cr 1.74 today  -urology following, recs appreciated. Not recommending acute intervention for hematuria at this time  -f/u repeat retroperitoneal US for monitoring obstructive uropathy in the setting of continued hematuria.  -c/w PO fluids and IV NS @ 75cc/hr. Will d/c if Cr normalizing  -monitor urinary output  -monitor urine now that patient off heparin gtt    **Findings of Cystitis on CT scan.  Pt with no complaints of suprapubic tenderness or dysuria. Will hold off ABX.
Pt with normocytic anemia on labs. Likely 2/2 possible lymphoma.   -Follow anemia labs. Iron studies, Folic acid levels, Vit b12 levels, Retic.
Pt with normocytic anemia on labs. Likely 2/2 possible lymphoma.   -Follow anemia labs. Iron studies, Folic acid levels, Vit b12 levels, Retic.
Pt with normocytic anemia on labs. Bone marrow bx demonstrated no evidence of lymphoma, IHC stains suggest prostatic adenocarcinoma. Anemia possibly due to blood loss from hematuria vs. anemia of chronic disease. Vitb12, folic acid, serum iron WNL  -continue to monitor as patient now off heparin gtt due to decreasing hemoglobin and continued hematuria

## 2018-08-26 NOTE — PROGRESS NOTE ADULT - PROBLEM SELECTOR PLAN 6
Pt recently diagnosed with Hep C.   - f/u acute hepatitis panel and HIV.
2/2 to obstructive nephropathy from bulky LAD + Tumor lysis syndrome, resolving w/ IVFs and uretral stents  c/w maintenance IVFs, will stop if RF improved to normal nelly
2/2 to obstructive nephropathy from bulky LAD + Tumor lysis syndrome, resolving w/ IVFs and uretral stents  hold IVFs today due to worsening LE edema, check urine lytes to confirm if pre-renal component still present
Pt recently diagnosed with Hep C.   - Hep C positive, consider treatment  - HIV negative.
s/p b/l stent placement by urology
Pt recently diagnosed with Hep C.   - Hep C positive, consider treatment  - HIV negative.
Pt recently diagnosed with Hep C.   - Hep C positive, consider treatment  - HIV negative.
Pt recently diagnosed with Hep C.   - f/u acute hepatitis panel and HIV.
Pt recently diagnosed with Hep C.   - Hep C positive, consider treatment  - HIV negative.

## 2018-08-26 NOTE — PROGRESS NOTE ADULT - PROBLEM SELECTOR PLAN 7
Pt with c/o b/l lower extremity edema.  - Pitting in nature b/l +  - Possible likely IVC obstruction? 2/2 lymphadenopathy vs cardiac in origin.  Will ECHO in AM.
Pt recently diagnosed with Hep C.   - f/u acute hepatitis panel and HIV.
Pt with c/o b/l lower extremity edema. Unlikely cardiac etiology given echo 8/21 demonstrated LV EF 63%, no hemodynamically significant vascular disease  - Pitting in nature b/l +  - Possible likely IVC obstruction? 2/2 lymphadenopathy
hx of hematuria prior to admission 2/2 to bulky LAD w/ ureteral obstruction, exacerbated by hep gtt and ureteral stent placement by urology  - taken off hep gtt, hematuria resolving, cont to monitor H&H improving.
hx of hematuria prior to admission 2/2 to bulky LAD w/ ureteral obstruction, exacerbated by hep gtt and ureteral stent placement by urology  - taken off hep gtt, hematuria resolving, cont to monitor H&H improving.
Pt with c/o b/l lower extremity edema. Unlikely cardiac etiology given echo 8/21 demonstrated LV EF 63%, no hemodynamically significant vascular disease  - Pitting in nature b/l +  - Possible likely IVC obstruction? 2/2 lymphadenopathy
Pt with c/o b/l lower extremity edema. Unlikely cardiac etiology given echo 8/21 demonstrated LV EF 63%, no hemodynamically significant vascular disease  - Pitting in nature b/l +  - Possible likely IVC obstruction? 2/2 lymphadenopathy
Pt with c/o b/l lower extremity edema.  - Pitting in nature b/l +  - Possible likely IVC obstruction? 2/2 lymphadenopathy vs cardiac in origin.  Will ECHO in AM.
Pt with c/o b/l lower extremity edema. Unlikely cardiac etiology given echo 8/21 demonstrated LV EF 63%, no hemodynamically significant vascular disease  - Pitting in nature b/l +  - Possible likely IVC obstruction? 2/2 lymphadenopathy
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
## 2018-08-26 NOTE — PROGRESS NOTE ADULT - PROBLEM SELECTOR PLAN 2
2/2 to diffuse bulky LAD, LDH elevated, renal failure, elevated uric acid s/p rasburicase 6 mg x 1, c/w allopurinol 300 mg daily  stop maintenance IVFs in setting of worsening LE edema
2/2 to diffuse bulky LAD, LDH elevated, renal failure, elevated uric acid s/p rasburicase 6 mg x 1, c/w allopurinol 300 mg daily, maintenance IVFs
Pt with normocytic anemia on labs. Bone marrow bx demonstrated no evidence of lymphoma, IHC stains suggest prostatic adenocarcinoma. Anemia possibly due to blood loss from hematuria vs. anemia of chronic disease. Vitb12, folic acid, serum iron WNL  -continue to monitor as patient now off heparin gtt due to decreasing hemoglobin and continued hematuria  -Hemoglobin 8.1 (8/23) <-9.1 (8/23), still with red punch colored hematuria. F/u 4PM CBC. Transfuse for <7 although consider transfusing if 4PM CBC significantly decreased from morning.
cont heparin gtt
Pt presenting with c/o Lower extremity swelling. In setting of lymphadenopathy (and likely Lymphoma) likely cause of DVT. U/S doppler confirmed DVT in left calf.   -assess risk vs. benefit of full AC on heparin drip @ 20cc/hr vs. consistent hematuria. For now, patient's Hb stable at ~9.5-10 and continuing AC  -f/u AM PTT   -f/u Hb
Pt with normocytic anemia on labs. Bone marrow bx demonstrated no evidence of lymphoma, IHC stains suggest prostatic adenocarcinoma. Anemia possibly due to blood loss from hematuria vs. anemia of chronic disease. Vitb12, folic acid, serum iron WNL  -continue to monitor as patient now off heparin gtt due to decreasing hemoglobin and continued hematuria  -Hemoglobin 9.3 (8/24) <-- 8.1 (8/23) <-9.1 (8/23) without transfusion. Hematuria improving although still present  -transfuse for Hb <7  -holding heparin gtt in setting of anemia
Pt presenting with c/o Lower extremity swelling. In setting of lymphadenopathy (and likely Lymphoma) likely cause of DVT. U/S doppler confirmed DVT in left calf.   -Typically would like to have the pt on Lovenox. But pt needs lymph node biopsy in the am also has KIRSTEN. So will start pt on Heparin drip for now.   -Monitor aPTT q6h
Pt presenting with c/o Lower extremity swelling. In setting of lymphadenopathy (and likely Lymphoma) likely cause of DVT. U/S doppler confirmed DVT in left calf.   -d/nhi heparin gtt as Hb dropping (Hb 9.1 today), still with red punch colored hematuria.     -f/u Hb  -consider IVF filter in hypercoaguable patient with known DVT but AC not an option as bleeding with dropping Hb
Pt presenting with c/o Lower extremity swelling. In setting of lymphadenopathy (and likely Lymphoma) likely cause of DVT. U/S doppler confirmed DVT in left calf.   -Typically would like to have the pt on Lovenox. But pt needs lymph node biopsy in the am also has KIRSTEN. So will start pt on Heparin drip for now.   -Monitor aPTT q6h

## 2018-08-26 NOTE — PROGRESS NOTE ADULT - PROBLEM SELECTOR PROBLEM 3
DVT of lower limb, acute
Lymphadenopathy
Lymphadenopathy
Tumor lysis syndrome
DVT of lower limb, acute
Tumor lysis syndrome

## 2018-08-26 NOTE — PROGRESS NOTE ADULT - PROBLEM SELECTOR PROBLEM 4
DVT of lower limb, acute
DVT of lower limb, acute
Gross hematuria
Tumor lysis syndrome
Hydronephrosis, bilateral
Tumor lysis syndrome
Hydronephrosis, bilateral

## 2018-08-26 NOTE — PROGRESS NOTE ADULT - PROBLEM SELECTOR PLAN 3
- s/p Rasburicase    - c/w Allopurinol 300mg daily.   - C/w IV hydration
Pt presenting with c/o Lower extremity swelling. In setting of lymphadenopathy (and likely Lymphoma) likely cause of DVT. U/S doppler confirmed DVT in left calf. D/nhi heparin gtt as Hb dropping   -continue to monitor   -consider IVF filter in hypercoaguable patient with known DVT but AC not an option as bleeding with dropping Hb
etiology possibel lymphoprolfierative disorder vs metastatic prostate ca, s/p incision bx with ENT- f/u path. PET/CT completed  heme/onc following, outpt heme follow up for treatment
etiology possible lymphoprolfierative disorder vs metastatic prostate ca, s/p incision bx with ENT- f/u path. PET/CT completed  heme/onc following, outpt heme follow up for treatment
Pt presenting with c/o Lower extremity swelling. In setting of lymphadenopathy (and likely Lymphoma) likely cause of DVT. U/S doppler confirmed DVT in left calf. D/nhi heparin gtt as Hb dropping   -continue to monitor   -consider IVF filter in hypercoaguable patient with known DVT but AC not an option as patient with hematuria and labile Hb when on heparin
Pt with elevated Uric acid levels >8, KIRSTEN (3.11), elevated LDH.  - s/p Rasburicase 6mg IV once, uric acid level decreased appropriately  - c/w Allopurinol 300mg daily.   - C/w IV NS @75c/hr
Pt with elevated Uric acid levels >8, KIRSTEN (3.11), elevated LDH.  - s/p Rasburicase 6mg IV once, uric acid level decreased appropriately  - c/w Allopurinol 300mg daily.   - C/w IV hydration and monitor resp status.
Pt with elevated Uric acid levels >8, KIRSTEN (3.11) on admission, elevated LDH.  - s/p Rasburicase 6mg IV once, uric acid level decreased appropriately  - c/w Allopurinol 300mg daily.   - C/w IV NS @75c/hr
Pt with elevated Uric acid levels >8, KIRSTEN (3.11), elevated LDH.  - s/p Rasburicase 6mg IV once, uric acid level decreased appropriately  - c/w Allopurinol 300mg daily.   - C/w IV hydration and monitor resp status.

## 2018-08-26 NOTE — PROGRESS NOTE ADULT - PROBLEM SELECTOR PLAN 4
Pt with elevated Uric acid levels >8, KIRSTEN (3.11) on admission, elevated LDH.  - s/p Rasburicase 6mg IV once, uric acid level decreased appropriately  - c/w Allopurinol 300mg daily.   - C/w IV NS @75c/hr
hb stable  will monitor  urology ok w/ continuation of heparin gtt at this time
in setting of malignancy, distal intramuscular calf DVT- on hep gtt w/ significant hematuria,  hep gtt on hold . hematuria has resolved. f/u heme recs regarding long term AC
in setting of malignancy, distal intramuscular calf DVT- on hep gtt w/ significant hematuria,  hep gtt on hold now until hematuria resolves. f/u heme recs regarding long term AC
Pt with elevated Uric acid levels >8, KIRSTEN (3.11) on admission, elevated LDH.  - s/p Rasburicase 6mg IV once, uric acid level decreased appropriately  - c/w Allopurinol 300mg daily.   - C/w IV NS @75c/hr. Consider d/cing IVF if creatinine normalizing
Pt with findings of b/l hydronephrosis. Likely obstructive in nature 2/2 diffuse RP lymphadenopathy.   - s/p b/l stent placement by urology  - s/p maddox, passed TOV. Still urinating red fruit punch colored urine.  -Cr improving    **Findings of Cystitis on CT scan.  Pt with no complaints of suprapubic tenderness or dysuria. Will hold off ABX.
Pt with findings of b/l hydronephrosis. Likely obstructive in nature 2/2 diffuse RP lymphadenopathy.   - undergoing b/l stent placement by urology    **Findings of Cystitis on CT scan.  Pt with no complaints of suprapubic tenderness or dysuria. Will hold off ABX.
Pt with findings of b/l hydronephrosis. Likely obstructive in nature 2/2 diffuse retroperitoneal lymphadenopathy.   - s/p b/l stent placement by urology  - s/p maddox, passed TOV. Still urinating red fruit punch colored urine.  - Cr improving, Cr 2.39 today  -urology following, recs appreciated  -c/w PO fluids and IV NS @ 75cc/hr  -monitor urinary output  -monitor urine now that patient off heparin gtt    **Findings of Cystitis on CT scan.  Pt with no complaints of suprapubic tenderness or dysuria. Will hold off ABX.
Pt with findings of b/l hydronephrosis. Likely obstructive in nature 2/2 diffuse RP lymphadenopathy.   - s/p b/l stent placement by urology  - maddox draining red liquid    **Findings of Cystitis on CT scan.  Pt with no complaints of suprapubic tenderness or dysuria. Will hold off ABX.

## 2018-08-26 NOTE — PROGRESS NOTE ADULT - PROBLEM SELECTOR PROBLEM 7
Edema of both legs
Edema of both legs
Hematuria
Hematuria
Hepatitis C
Edema of both legs

## 2018-08-26 NOTE — PROGRESS NOTE ADULT - PROBLEM SELECTOR PROBLEM 6
Hepatitis C
ARF (acute renal failure)
ARF (acute renal failure)
Hepatitis C
Hydronephrosis, bilateral
Hepatitis C

## 2018-08-26 NOTE — PROGRESS NOTE ADULT - PROBLEM SELECTOR PROBLEM 8
ARF (acute renal failure)
No significant past surgical history
ARF (acute renal failure)
Prostate cancer, primary, with metastasis from prostate to other site
Prostate cancer, primary, with metastasis from prostate to other site
ARF (acute renal failure)

## 2018-08-26 NOTE — PROGRESS NOTE ADULT - PROBLEM SELECTOR PLAN 8
Pt with findings of elevated creatinine from baseline.  Likely 2/2 TLS vs obstruction from lymphadenopathy.   - f/u urine lytes.
Pt with findings of elevated creatinine from baseline.  Likely 2/2 TLS vs obstruction from lymphadenopathy.   - f/u urine lytes.
incidental finding of prostate adenoca on bone marrow biopsy    urology following  onc following- f/u recs  bone scan done today to assess for bony mets
incidental finding of prostate adenoca on bone marrow biopsy    urology following  onc following- f/u recs  s/p bone scan done to assess for bony mets
Pt with findings of elevated creatinine from baseline.  Likely 2/2 TLS vs obstruction from lymphadenopathy.   - f/u urine lytes.

## 2018-08-26 NOTE — PROGRESS NOTE ADULT - PROBLEM SELECTOR PROBLEM 1
Lymphadenopathy
Lymphadenopathy
Obstructive nephropathy
Obstructive nephropathy
Lymphadenopathy

## 2018-08-26 NOTE — PROGRESS NOTE ADULT - PROBLEM SELECTOR PROBLEM 2
DVT of lower limb, acute
Normocytic anemia
Tumor lysis syndrome
Tumor lysis syndrome
DVT of lower limb, acute
Normocytic anemia
DVT of lower limb, acute

## 2018-08-27 ENCOUNTER — TRANSCRIPTION ENCOUNTER (OUTPATIENT)
Age: 58
End: 2018-08-27

## 2018-08-27 VITALS
DIASTOLIC BLOOD PRESSURE: 79 MMHG | HEART RATE: 89 BPM | OXYGEN SATURATION: 98 % | RESPIRATION RATE: 16 BRPM | SYSTOLIC BLOOD PRESSURE: 123 MMHG | TEMPERATURE: 98 F

## 2018-08-27 LAB
ANION GAP SERPL CALC-SCNC: 13 MMOL/L — SIGNIFICANT CHANGE UP (ref 5–17)
BUN SERPL-MCNC: 28 MG/DL — HIGH (ref 7–23)
CALCIUM SERPL-MCNC: 8.8 MG/DL — SIGNIFICANT CHANGE UP (ref 8.4–10.5)
CHLORIDE SERPL-SCNC: 100 MMOL/L — SIGNIFICANT CHANGE UP (ref 96–108)
CO2 SERPL-SCNC: 22 MMOL/L — SIGNIFICANT CHANGE UP (ref 22–31)
CREAT SERPL-MCNC: 1.79 MG/DL — HIGH (ref 0.5–1.3)
GLUCOSE SERPL-MCNC: 91 MG/DL — SIGNIFICANT CHANGE UP (ref 70–99)
HCT VFR BLD CALC: 26.1 % — LOW (ref 39–50)
HGB BLD-MCNC: 8.5 G/DL — LOW (ref 13–17)
MAGNESIUM SERPL-MCNC: 1.8 MG/DL — SIGNIFICANT CHANGE UP (ref 1.6–2.6)
MCHC RBC-ENTMCNC: 32.2 PG — SIGNIFICANT CHANGE UP (ref 27–34)
MCHC RBC-ENTMCNC: 32.6 G/DL — SIGNIFICANT CHANGE UP (ref 32–36)
MCV RBC AUTO: 98.9 FL — SIGNIFICANT CHANGE UP (ref 80–100)
PHOSPHATE SERPL-MCNC: 4.9 MG/DL — HIGH (ref 2.5–4.5)
PLATELET # BLD AUTO: 196 K/UL — SIGNIFICANT CHANGE UP (ref 150–400)
POTASSIUM SERPL-MCNC: 4.9 MMOL/L — SIGNIFICANT CHANGE UP (ref 3.5–5.3)
POTASSIUM SERPL-SCNC: 4.9 MMOL/L — SIGNIFICANT CHANGE UP (ref 3.5–5.3)
RBC # BLD: 2.64 M/UL — LOW (ref 4.2–5.8)
RBC # FLD: 12.1 % — SIGNIFICANT CHANGE UP (ref 10.3–16.9)
SODIUM SERPL-SCNC: 135 MMOL/L — SIGNIFICANT CHANGE UP (ref 135–145)
TESTOST FLD-SCNC: 304 NG/DL — SIGNIFICANT CHANGE UP (ref 264–916)
TESTOSTERONE.FREE+WB SERPL-MCNC: 4.3 PG/ML — LOW (ref 7.2–24)
WBC # BLD: 6.6 K/UL — SIGNIFICANT CHANGE UP (ref 3.8–10.5)
WBC # FLD AUTO: 6.6 K/UL — SIGNIFICANT CHANGE UP (ref 3.8–10.5)

## 2018-08-27 PROCEDURE — 93971 EXTREMITY STUDY: CPT | Mod: 26,LT

## 2018-08-27 PROCEDURE — 99233 SBSQ HOSP IP/OBS HIGH 50: CPT

## 2018-08-27 RX ORDER — APIXABAN 2.5 MG/1
1 TABLET, FILM COATED ORAL
Qty: 60 | Refills: 3 | OUTPATIENT
Start: 2018-08-27 | End: 2018-12-24

## 2018-08-27 RX ORDER — ALLOPURINOL 300 MG
1 TABLET ORAL
Qty: 30 | Refills: 3 | OUTPATIENT
Start: 2018-08-27 | End: 2018-12-24

## 2018-08-27 RX ADMIN — Medication 300 MILLIGRAM(S): at 11:57

## 2018-08-27 NOTE — DISCHARGE NOTE ADULT - SECONDARY DIAGNOSIS.
Tumor lysis syndrome Normocytic anemia Lymphadenopathy Hydronephrosis, bilateral Hepatitis C Transition of care performed with sharing of clinical summary Obstructive nephropathy DVT of lower limb, acute

## 2018-08-27 NOTE — DISCHARGE NOTE ADULT - PLAN OF CARE
Management of underlying condition You were diagnosed with prostate cancer in the hospital from your bone marrow biopsy results. You also had a bone scan, which is a test that allows the oncologists to see how advanced the cancer is. You were diagnosed with prostate cancer in the hospital from your bone marrow biopsy results. You also had a bone scan, which is a test that allows the oncologists to see where the cancer could have spread to. The results of your bone scan demonstrated that the cancer spread to multiple places in your bones (left femur, right humerus, right scapula, and upper left rib). We made you a follow up appointment with the hematology/oncology team (Dr. Whalen) so they can discuss treatment options for you. Your appointment is on .... We also made you an appointment with our urology team (Dr. Valverde) who was following you for your You were diagnosed with prostate cancer in the hospital from your bone marrow biopsy results. You also had a bone scan, which is a test that allows the oncologists to see where the cancer could have spread to. The results of your bone scan demonstrated that the cancer spread to multiple places in your bones (left femur, right humerus, right scapula, and upper left rib). We made you a follow up appointment with the hematology/oncology team (Dr. Whalen) so they can discuss treatment options for you. Your appointment is on September 4th at 2PM. We also made you an appointment with our urology team (Dr. Valverde) who was following you because you had an obstruction in your urinary tract that caused kidney damage. Your appointment is on September 28th at 9:30AM. You might be contacted You had enlarged lymph nodes that we were able to appreciate on your neck and on your CT exam. Our ENT doctors did a biopsy of one of these lymph nodes to determine the cause of why they were enlarged. The results of the biopsy have not yet resulted but you can follow up with them when you go to your hematolgy/oncology appointment with Dr. Whalen. Your follow up appointment with the hematology/oncology team is on September 4th at 2PM. Please attend this appointment. Management You have a history of Hepatitis C, which is a viral infection of the liver. Hepatitis C can cause a chronic infection that can increase your risk of developing worsening liver damage and liver cancer. Hepatitis C can be treated to prevent these risks. Please attend the appointment we made you for our outpatient medicine clinic. The  will notify you of when your appointment will be. We found that your kidneys were damaged due to an obstruction in the urinary tract. Our urology team put stents in your urinary tract to remove the obstruction. Your kidney function has since improved but still is not at your baseline level of functioning.  Your kidney function will be rechecked in our outpatient medicine clinic at your next appointment. We made you an appointment with our urology team (Dr. Valverde). Your appointment is on September 28th at 9:30AM. You might be contacted by Dr. Valverde's office later to see if your appointment can be made sooner. If you notice more blood in your urine or that your are urinating less frequently than usual, please contact the urologist or come back to the emergency department. Resolution We found that you have a clot in your leg from the ultrasound of the lower extremities. The treatment of this clot is typically with anticoagulation, such as Eliquis. We are starting you on Eliquis 5mg, which should be taken 2 times per day for an indefinite time period. Eliquis will increase your risk of bleeding because it thins your blood. If you continue to notice blood in your urine, black or tarry/sticky stools, or other signs of bleeding, please contact your primary care doctor or return to the ED. Continuity of Care 1) PCP Contacted on Admission: (Y/N) --> Name & Phone #:  2) Date of Contact with PCP:  3) PCP Contacted at Discharge: (Y/N, N/A): N/A - patient without PCP  4) Summary of Handoff Given to PCP:   5) Post-Discharge Appointment Date and Location:  [facility] Internal Medicine Clinic: TBD (possibly some time between 8/29-8/31) -  will contact patient regarding appointment time.  Dr. Whalen (Hematology Oncology): September 4th at 2PM.  Dr. Valverde (Urology) appointment is on September 28th at 9:30AM. You were diagnosed with prostate cancer in the hospital from your bone marrow biopsy results. You also had a bone scan, which is a test that allows the oncologists to see where the cancer could have spread to. The results of your bone scan demonstrated that the cancer spread to multiple places in your bones (left femur, right humerus, right scapula, and upper left rib). We made you a follow up appointment with the hematology/oncology team (Dr. Whalen) so they can discuss treatment options for you. Your appointment is on September 4th at 2PM. We also made you an appointment with our urology team (Dr. Valverde) who was following you because you had an obstruction in your urinary tract that caused kidney damage. Your appointment is on September 28th at 9:30AM. You might be contacted by Dr. Valverde's office later to see if your appointment can be made sooner. We also made an appointment with our outpatient internal medicine clinic. The  at this clinic will call you and notify you of your appointment time. Your appointment will likely be later in this week some time between Wednesday, 8/29 to Friday, 8/31. You will need a repeat CBC and BMP to assess your blood level and your kidney function. Please attend your appointments. Hemoglobin >7 We saw that your hemoglobin level, which is a measurement we use to see how much blood you have, was low. We suspected that part of the reason this was low was due to your issue bleeding from the urine. We are starting you on Eliquis 5mg, which should be taken 2 times per day for an indefinite time period. We are prescribing this medication because have a blood clot in your leg. Eliquis will increase your risk of bleeding because it thins your blood. If you continue to notice blood in your urine, black or tarry/sticky stools, or other signs of bleeding, please contact your primary care doctor or return to the ED. We made you an appointment with our outpatient internal medicine clinic. The  at this clinic will call you and notify you of your appointment time. Your appointment will likely be later in this week some time between 8/29-8/31. You will need a repeat CBC and BMP to assess your blood level and your kidney function. You were diagnosed with prostate cancer in the hospital from your bone marrow biopsy results. You also had a bone scan, which is a test that allows the oncologists to see where the cancer could have spread to. The results of your bone scan demonstrated that the cancer spread to multiple places in your bones (left femur, right humerus, right scapula, and upper left rib). We made you a follow up appointment with the hematology/oncology team (Dr. Whalen) so they can discuss treatment options for you. Your appointment is on September 4th at 2PM. We also made you an appointment with our urology team (Dr. Valverde) who was following you because you had an obstruction in your urinary tract that caused kidney damage. Your appointment is on September 28th at 9:30AM. You might be contacted by Dr. Valverde's office later to see if your appointment can be made sooner. We also made an appointment with our outpatient internal medicine clinic, which is scheduled for Friday, August 31st at 10AM. You will need a repeat CBC and BMP to assess your blood level and your kidney function at the appointment on Friday, 8/31. Please attend your appointments. We saw that your hemoglobin level, which is a measurement we use to see how much blood you have, was low. We suspected that part of the reason this was low was due to your issue bleeding from the urine. We are starting you on Eliquis 5mg, which should be taken 2 times per day for an indefinite time period. We are prescribing this medication because have a blood clot in your leg. Eliquis will increase your risk of bleeding because it thins your blood. If you continue to notice blood in your urine, black or tarry/sticky stools, or other signs of bleeding, please contact your primary care doctor or return to the ED. We made you an appointment with our outpatient internal medicine clinic, which is on Friday August 31st at 10AM. You will need a repeat CBC and BMP to assess your blood level and your kidney function. You have a history of Hepatitis C, which is a viral infection of the liver. Hepatitis C can cause a chronic infection that can increase your risk of developing worsening liver damage and liver cancer. Hepatitis C can be treated to prevent these risks. You also were found to not have immunity against Hepatitis B, which is also a virus of the liver that can increase your risk of liver cancer. Please ask about Hepatitis C treatment and Hepatitis B prevention at your appointment with our internal medicine clinic on Friday, August 31st at 10AM. 1) PCP Contacted on Admission: (Y/N) --> Name & Phone #:  2) Date of Contact with PCP:  3) PCP Contacted at Discharge: (Y/N, N/A): N/A - patient without PCP  4) Summary of Handoff Given to PCP:   5) Post-Discharge Appointment Date and Location:  [facility] Internal Medicine Clinic: Friday August 31st at 10AM  Dr. Whalen (Hematology Oncology): September 4th at 2PM.  Dr. Valverde (Urology) appointment is on September 28th at 9:30AM. We saw that your hemoglobin level, which is a measurement we use to see how much blood you have, was low. We suspected that part of the reason this was low was due to your issue bleeding from the urine. We recommended treating you with Eliquis 5mg twice per day but you refused treatement at this time.  We made you an appointment with our outpatient internal medicine clinic, which is on Friday August 31st at 10AM. You will need a repeat CBC and BMP to assess your blood level and your kidney function. We found that you have a clot in your leg from the ultrasound of the lower extremities. The treatment of this clot is typically with anticoagulation, such as Eliquis. We recommended treating you with Eliquis 5mg twice per day but [facility] refused treatement at this time. You were diagnosed with prostate cancer in the hospital from your bone marrow biopsy results. You also had a bone scan, which is a test that allows the oncologists to see where the cancer could have spread to. The results of your bone scan demonstrated that the cancer spread to multiple places in your bones (left femur, right humerus, right scapula, and upper left rib). We made you a follow up appointment with the hematology/oncology team (Dr. Whalen) so they can discuss treatment options for you. Your appointment is on September 5th at 3PM. We also made you an appointment with our urology team (Dr. Valverde) who was following you because you had an obstruction in your urinary tract that caused kidney damage. Your appointment is on September 28th at 9:30AM. You might be contacted by Dr. Valverde's office later to see if your appointment can be made sooner. We also made an appointment with our outpatient internal medicine clinic, which is scheduled for Friday, August 31st at 10AM. You will need a repeat CBC and BMP to assess your blood level and your kidney function at the appointment on Friday, 8/31. Please attend your appointments. You had enlarged lymph nodes that we were able to appreciate on your neck and on your CT exam. Our ENT doctors did a biopsy of one of these lymph nodes to determine the cause of why they were enlarged. The results of the biopsy have not yet resulted but you can follow up with them when you go to your hematolgy/oncology appointment with Dr. Whalen. Your follow up appointment with the hematology/oncology team is on September 5th at 3PM. Please attend this appointment. We found that you have a clot in your leg from the ultrasound of the lower extremities. The treatment of this clot is typically with anticoagulation, such as Eliquis. We recommended treating you with Eliquis 5mg twice per day but you refused treatement at this time. 1) PCP Contacted on Admission: (Y/N) --> Name & Phone #:  2) Date of Contact with PCP:  3) PCP Contacted at Discharge: (Y/N, N/A): N/A - patient without PCP  4) Summary of Handoff Given to PCP:   5) Post-Discharge Appointment Date and Location:  [facility] Internal Medicine Clinic: Friday August 31st at 10AM  Dr. Whalen (Hematology Oncology): September 5th at 3PM.  Dr. Valverde (Urology) appointment is on September 28th at 9:30AM.

## 2018-08-27 NOTE — DISCHARGE NOTE ADULT - MEDICATION SUMMARY - MEDICATIONS TO STOP TAKING
I will STOP taking the medications listed below when I get home from the hospital:  None I will STOP taking the medications listed below when I get home from the hospital:    Eliquis 5 mg oral tablet  -- 1 tab(s) by mouth 2 times a day   -- Check with your doctor before becoming pregnant.  It is very important that you take or use this exactly as directed.  Do not skip doses or discontinue unless directed by your doctor.  Obtain medical advice before taking any non-prescription drugs as some may affect the action of this medication.

## 2018-08-27 NOTE — DISCHARGE NOTE ADULT - PROVIDER TOKENS
KRYS:'[id number]',KRYS:'[id number]' TOKEN:'[id number]',TOKEN:'[id number]',FREE:[LAST:[Aime Harvey],FIRST:[Medicine Clinic],PHONE:[phone]],FAX:[phone]],ADDRESS:[[address] [place] [address], [address] [place] [address]]]

## 2018-08-27 NOTE — DISCHARGE NOTE ADULT - CARE PLAN
Principal Discharge DX:	Prostate cancer, primary, with metastasis from prostate to other site  Goal:	Management of underlying condition  Assessment and plan of treatment:	You were diagnosed with prostate cancer in the hospital from your bone marrow biopsy results. You also had a bone scan, which is a test that allows the oncologists to see how advanced the cancer is.  Secondary Diagnosis:	Tumor lysis syndrome  Secondary Diagnosis:	Normocytic anemia  Secondary Diagnosis:	Lymphadenopathy  Secondary Diagnosis:	Hydronephrosis, bilateral  Secondary Diagnosis:	Hepatitis C  Secondary Diagnosis:	Transition of care performed with sharing of clinical summary Principal Discharge DX:	Prostate cancer, primary, with metastasis from prostate to other site  Goal:	Management of underlying condition  Assessment and plan of treatment:	You were diagnosed with prostate cancer in the hospital from your bone marrow biopsy results. You also had a bone scan, which is a test that allows the oncologists to see where the cancer could have spread to. The results of your bone scan demonstrated that the cancer spread to multiple places in your bones (left femur, right humerus, right scapula, and upper left rib). We made you a follow up appointment with the hematology/oncology team (Dr. Whalen) so they can discuss treatment options for you. Your appointment is on .... We also made you an appointment with our urology team (Dr. Valverde) who was following you for your  Secondary Diagnosis:	Normocytic anemia  Secondary Diagnosis:	Lymphadenopathy  Secondary Diagnosis:	Hepatitis C  Secondary Diagnosis:	Obstructive nephropathy  Secondary Diagnosis:	DVT of lower limb, acute  Secondary Diagnosis:	Transition of care performed with sharing of clinical summary Principal Discharge DX:	Prostate cancer, primary, with metastasis from prostate to other site  Goal:	Management of underlying condition  Assessment and plan of treatment:	You were diagnosed with prostate cancer in the hospital from your bone marrow biopsy results. You also had a bone scan, which is a test that allows the oncologists to see where the cancer could have spread to. The results of your bone scan demonstrated that the cancer spread to multiple places in your bones (left femur, right humerus, right scapula, and upper left rib). We made you a follow up appointment with the hematology/oncology team (Dr. Whalen) so they can discuss treatment options for you. Your appointment is on September 4th at 2PM. We also made you an appointment with our urology team (Dr. Valverde) who was following you because you had an obstruction in your urinary tract that caused kidney damage. Your appointment is on September 28th at 9:30AM. You might be contacted  Secondary Diagnosis:	Normocytic anemia  Secondary Diagnosis:	Lymphadenopathy  Secondary Diagnosis:	Hepatitis C  Secondary Diagnosis:	Obstructive nephropathy  Secondary Diagnosis:	DVT of lower limb, acute  Secondary Diagnosis:	Transition of care performed with sharing of clinical summary Principal Discharge DX:	Prostate cancer, primary, with metastasis from prostate to other site  Goal:	Management of underlying condition  Assessment and plan of treatment:	You were diagnosed with prostate cancer in the hospital from your bone marrow biopsy results. You also had a bone scan, which is a test that allows the oncologists to see where the cancer could have spread to. The results of your bone scan demonstrated that the cancer spread to multiple places in your bones (left femur, right humerus, right scapula, and upper left rib). We made you a follow up appointment with the hematology/oncology team (Dr. Whalen) so they can discuss treatment options for you. Your appointment is on September 4th at 2PM. We also made you an appointment with our urology team (Dr. Valverde) who was following you because you had an obstruction in your urinary tract that caused kidney damage. Your appointment is on September 28th at 9:30AM. You might be contacted by Dr. Valverde's office later to see if your appointment can be made sooner. We also made an appointment with our outpatient internal medicine clinic. The  at this clinic will call you and notify you of your appointment time. Your appointment will likely be later in this week some time between Wednesday, 8/29 to Friday, 8/31. You will need a repeat CBC and BMP to assess your blood level and your kidney function. Please attend your appointments.  Secondary Diagnosis:	Normocytic anemia  Goal:	Hemoglobin >7  Assessment and plan of treatment:	We saw that your hemoglobin level, which is a measurement we use to see how much blood you have, was low. We suspected that part of the reason this was low was due to your issue bleeding from the urine. We are starting you on Eliquis 5mg, which should be taken 2 times per day for an indefinite time period. We are prescribing this medication because have a blood clot in your leg. Eliquis will increase your risk of bleeding because it thins your blood. If you continue to notice blood in your urine, black or tarry/sticky stools, or other signs of bleeding, please contact your primary care doctor or return to the ED. We made you an appointment with our outpatient internal medicine clinic. The  at this clinic will call you and notify you of your appointment time. Your appointment will likely be later in this week some time between 8/29-8/31. You will need a repeat CBC and BMP to assess your blood level and your kidney function.  Secondary Diagnosis:	Lymphadenopathy  Goal:	Management of underlying condition  Assessment and plan of treatment:	You had enlarged lymph nodes that we were able to appreciate on your neck and on your CT exam. Our ENT doctors did a biopsy of one of these lymph nodes to determine the cause of why they were enlarged. The results of the biopsy have not yet resulted but you can follow up with them when you go to your hematolgy/oncology appointment with Dr. Whalen. Your follow up appointment with the hematology/oncology team is on September 4th at 2PM. Please attend this appointment.  Secondary Diagnosis:	Hepatitis C  Goal:	Management  Assessment and plan of treatment:	You have a history of Hepatitis C, which is a viral infection of the liver. Hepatitis C can cause a chronic infection that can increase your risk of developing worsening liver damage and liver cancer. Hepatitis C can be treated to prevent these risks. Please attend the appointment we made you for our outpatient medicine clinic. The  will notify you of when your appointment will be.  Secondary Diagnosis:	Obstructive nephropathy  Goal:	Management  Assessment and plan of treatment:	We found that your kidneys were damaged due to an obstruction in the urinary tract. Our urology team put stents in your urinary tract to remove the obstruction. Your kidney function has since improved but still is not at your baseline level of functioning.  Your kidney function will be rechecked in our outpatient medicine clinic at your next appointment. We made you an appointment with our urology team (Dr. Valverde). Your appointment is on September 28th at 9:30AM. You might be contacted by Dr. Valverde's office later to see if your appointment can be made sooner. If you notice more blood in your urine or that your are urinating less frequently than usual, please contact the urologist or come back to the emergency department.  Secondary Diagnosis:	DVT of lower limb, acute  Goal:	Resolution  Assessment and plan of treatment:	We found that you have a clot in your leg from the ultrasound of the lower extremities. The treatment of this clot is typically with anticoagulation, such as Eliquis. We are starting you on Eliquis 5mg, which should be taken 2 times per day for an indefinite time period. Eliquis will increase your risk of bleeding because it thins your blood. If you continue to notice blood in your urine, black or tarry/sticky stools, or other signs of bleeding, please contact your primary care doctor or return to the ED.  Secondary Diagnosis:	Transition of care performed with sharing of clinical summary  Goal:	Continuity of Care  Assessment and plan of treatment:	1) PCP Contacted on Admission: (Y/N) --> Name & Phone #:  2) Date of Contact with PCP:  3) PCP Contacted at Discharge: (Y/N, N/A): N/A - patient without PCP  4) Summary of Handoff Given to PCP:   5) Post-Discharge Appointment Date and Location:  [facility] Internal Medicine Clinic: TBD (possibly some time between 8/29-8/31) -  will contact patient regarding appointment time.  Dr. Whalen (Hematology Oncology): September 4th at 2PM.  Dr. Valverde (Urology) appointment is on September 28th at 9:30AM. Principal Discharge DX:	Prostate cancer, primary, with metastasis from prostate to other site  Goal:	Management of underlying condition  Assessment and plan of treatment:	You were diagnosed with prostate cancer in the hospital from your bone marrow biopsy results. You also had a bone scan, which is a test that allows the oncologists to see where the cancer could have spread to. The results of your bone scan demonstrated that the cancer spread to multiple places in your bones (left femur, right humerus, right scapula, and upper left rib). We made you a follow up appointment with the hematology/oncology team (Dr. Whalen) so they can discuss treatment options for you. Your appointment is on September 4th at 2PM. We also made you an appointment with our urology team (Dr. Valverde) who was following you because you had an obstruction in your urinary tract that caused kidney damage. Your appointment is on September 28th at 9:30AM. You might be contacted by Dr. Valverde's office later to see if your appointment can be made sooner. We also made an appointment with our outpatient internal medicine clinic, which is scheduled for Friday, August 31st at 10AM. You will need a repeat CBC and BMP to assess your blood level and your kidney function at the appointment on Friday, 8/31. Please attend your appointments.  Secondary Diagnosis:	Normocytic anemia  Goal:	Hemoglobin >7  Assessment and plan of treatment:	We saw that your hemoglobin level, which is a measurement we use to see how much blood you have, was low. We suspected that part of the reason this was low was due to your issue bleeding from the urine. We are starting you on Eliquis 5mg, which should be taken 2 times per day for an indefinite time period. We are prescribing this medication because have a blood clot in your leg. Eliquis will increase your risk of bleeding because it thins your blood. If you continue to notice blood in your urine, black or tarry/sticky stools, or other signs of bleeding, please contact your primary care doctor or return to the ED. We made you an appointment with our outpatient internal medicine clinic, which is on Friday August 31st at 10AM. You will need a repeat CBC and BMP to assess your blood level and your kidney function.  Secondary Diagnosis:	Lymphadenopathy  Goal:	Management of underlying condition  Assessment and plan of treatment:	You had enlarged lymph nodes that we were able to appreciate on your neck and on your CT exam. Our ENT doctors did a biopsy of one of these lymph nodes to determine the cause of why they were enlarged. The results of the biopsy have not yet resulted but you can follow up with them when you go to your hematolgy/oncology appointment with Dr. Whalen. Your follow up appointment with the hematology/oncology team is on September 4th at 2PM. Please attend this appointment.  Secondary Diagnosis:	Hepatitis C  Goal:	Management  Assessment and plan of treatment:	You have a history of Hepatitis C, which is a viral infection of the liver. Hepatitis C can cause a chronic infection that can increase your risk of developing worsening liver damage and liver cancer. Hepatitis C can be treated to prevent these risks. You also were found to not have immunity against Hepatitis B, which is also a virus of the liver that can increase your risk of liver cancer. Please ask about Hepatitis C treatment and Hepatitis B prevention at your appointment with our internal medicine clinic on Friday, August 31st at 10AM.  Secondary Diagnosis:	Obstructive nephropathy  Goal:	Management  Assessment and plan of treatment:	We found that your kidneys were damaged due to an obstruction in the urinary tract. Our urology team put stents in your urinary tract to remove the obstruction. Your kidney function has since improved but still is not at your baseline level of functioning.  Your kidney function will be rechecked in our outpatient medicine clinic at your next appointment. We made you an appointment with our urology team (Dr. Valverde). Your appointment is on September 28th at 9:30AM. You might be contacted by Dr. Valverde's office later to see if your appointment can be made sooner. If you notice more blood in your urine or that your are urinating less frequently than usual, please contact the urologist or come back to the emergency department.  Secondary Diagnosis:	DVT of lower limb, acute  Goal:	Resolution  Assessment and plan of treatment:	We found that you have a clot in your leg from the ultrasound of the lower extremities. The treatment of this clot is typically with anticoagulation, such as Eliquis. We are starting you on Eliquis 5mg, which should be taken 2 times per day for an indefinite time period. Eliquis will increase your risk of bleeding because it thins your blood. If you continue to notice blood in your urine, black or tarry/sticky stools, or other signs of bleeding, please contact your primary care doctor or return to the ED.  Secondary Diagnosis:	Transition of care performed with sharing of clinical summary  Goal:	Continuity of Care  Assessment and plan of treatment:	1) PCP Contacted on Admission: (Y/N) --> Name & Phone #:  2) Date of Contact with PCP:  3) PCP Contacted at Discharge: (Y/N, N/A): N/A - patient without PCP  4) Summary of Handoff Given to PCP:   5) Post-Discharge Appointment Date and Location:  [facility] Internal Medicine Clinic: Friday August 31st at 10AM  Dr. Whalen (Hematology Oncology): September 4th at 2PM.  Dr. Valverde (Urology) appointment is on September 28th at 9:30AM. Principal Discharge DX:	Prostate cancer, primary, with metastasis from prostate to other site  Goal:	Management of underlying condition  Assessment and plan of treatment:	You were diagnosed with prostate cancer in the hospital from your bone marrow biopsy results. You also had a bone scan, which is a test that allows the oncologists to see where the cancer could have spread to. The results of your bone scan demonstrated that the cancer spread to multiple places in your bones (left femur, right humerus, right scapula, and upper left rib). We made you a follow up appointment with the hematology/oncology team (Dr. Whalen) so they can discuss treatment options for you. Your appointment is on September 4th at 2PM. We also made you an appointment with our urology team (Dr. Valverde) who was following you because you had an obstruction in your urinary tract that caused kidney damage. Your appointment is on September 28th at 9:30AM. You might be contacted by Dr. Valverde's office later to see if your appointment can be made sooner. We also made an appointment with our outpatient internal medicine clinic, which is scheduled for Friday, August 31st at 10AM. You will need a repeat CBC and BMP to assess your blood level and your kidney function at the appointment on Friday, 8/31. Please attend your appointments.  Secondary Diagnosis:	Normocytic anemia  Goal:	Hemoglobin >7  Assessment and plan of treatment:	We saw that your hemoglobin level, which is a measurement we use to see how much blood you have, was low. We suspected that part of the reason this was low was due to your issue bleeding from the urine. We recommended treating you with Eliquis 5mg twice per day but you refused treatement at this time.  We made you an appointment with our outpatient internal medicine clinic, which is on Friday August 31st at 10AM. You will need a repeat CBC and BMP to assess your blood level and your kidney function.  Secondary Diagnosis:	Lymphadenopathy  Goal:	Management of underlying condition  Assessment and plan of treatment:	You had enlarged lymph nodes that we were able to appreciate on your neck and on your CT exam. Our ENT doctors did a biopsy of one of these lymph nodes to determine the cause of why they were enlarged. The results of the biopsy have not yet resulted but you can follow up with them when you go to your hematolgy/oncology appointment with Dr. Whalen. Your follow up appointment with the hematology/oncology team is on September 4th at 2PM. Please attend this appointment.  Secondary Diagnosis:	Hepatitis C  Goal:	Management  Assessment and plan of treatment:	You have a history of Hepatitis C, which is a viral infection of the liver. Hepatitis C can cause a chronic infection that can increase your risk of developing worsening liver damage and liver cancer. Hepatitis C can be treated to prevent these risks. You also were found to not have immunity against Hepatitis B, which is also a virus of the liver that can increase your risk of liver cancer. Please ask about Hepatitis C treatment and Hepatitis B prevention at your appointment with our internal medicine clinic on Friday, August 31st at 10AM.  Secondary Diagnosis:	Obstructive nephropathy  Goal:	Management  Assessment and plan of treatment:	We found that your kidneys were damaged due to an obstruction in the urinary tract. Our urology team put stents in your urinary tract to remove the obstruction. Your kidney function has since improved but still is not at your baseline level of functioning.  Your kidney function will be rechecked in our outpatient medicine clinic at your next appointment. We made you an appointment with our urology team (Dr. Valverde). Your appointment is on September 28th at 9:30AM. You might be contacted by Dr. Valverde's office later to see if your appointment can be made sooner. If you notice more blood in your urine or that your are urinating less frequently than usual, please contact the urologist or come back to the emergency department.  Secondary Diagnosis:	DVT of lower limb, acute  Goal:	Resolution  Assessment and plan of treatment:	We found that you have a clot in your leg from the ultrasound of the lower extremities. The treatment of this clot is typically with anticoagulation, such as Eliquis. We recommended treating you with Eliquis 5mg twice per day but u refused treatement at this time.  Secondary Diagnosis:	Transition of care performed with sharing of clinical summary  Goal:	Continuity of Care  Assessment and plan of treatment:	1) PCP Contacted on Admission: (Y/N) --> Name & Phone #:  2) Date of Contact with PCP:  3) PCP Contacted at Discharge: (Y/N, N/A): N/A - patient without PCP  4) Summary of Handoff Given to PCP:   5) Post-Discharge Appointment Date and Location:  [facility] Internal Medicine Clinic: Friday August 31st at 10AM  Dr. Whalen (Hematology Oncology): September 4th at 2PM.  Dr. Valverde (Urology) appointment is on September 28th at 9:30AM. Principal Discharge DX:	Prostate cancer, primary, with metastasis from prostate to other site  Goal:	Management of underlying condition  Assessment and plan of treatment:	You were diagnosed with prostate cancer in the hospital from your bone marrow biopsy results. You also had a bone scan, which is a test that allows the oncologists to see where the cancer could have spread to. The results of your bone scan demonstrated that the cancer spread to multiple places in your bones (left femur, right humerus, right scapula, and upper left rib). We made you a follow up appointment with the hematology/oncology team (Dr. Whalen) so they can discuss treatment options for you. Your appointment is on September 5th at 3PM. We also made you an appointment with our urology team (Dr. Valverde) who was following you because you had an obstruction in your urinary tract that caused kidney damage. Your appointment is on September 28th at 9:30AM. You might be contacted by Dr. Valverde's office later to see if your appointment can be made sooner. We also made an appointment with our outpatient internal medicine clinic, which is scheduled for Friday, August 31st at 10AM. You will need a repeat CBC and BMP to assess your blood level and your kidney function at the appointment on Friday, 8/31. Please attend your appointments.  Secondary Diagnosis:	Normocytic anemia  Goal:	Hemoglobin >7  Assessment and plan of treatment:	We saw that your hemoglobin level, which is a measurement we use to see how much blood you have, was low. We suspected that part of the reason this was low was due to your issue bleeding from the urine. We recommended treating you with Eliquis 5mg twice per day but you refused treatement at this time.  We made you an appointment with our outpatient internal medicine clinic, which is on Friday August 31st at 10AM. You will need a repeat CBC and BMP to assess your blood level and your kidney function.  Secondary Diagnosis:	Lymphadenopathy  Goal:	Management of underlying condition  Assessment and plan of treatment:	You had enlarged lymph nodes that we were able to appreciate on your neck and on your CT exam. Our ENT doctors did a biopsy of one of these lymph nodes to determine the cause of why they were enlarged. The results of the biopsy have not yet resulted but you can follow up with them when you go to your hematolgy/oncology appointment with Dr. Whalen. Your follow up appointment with the hematology/oncology team is on September 5th at 3PM. Please attend this appointment.  Secondary Diagnosis:	Hepatitis C  Goal:	Management  Assessment and plan of treatment:	You have a history of Hepatitis C, which is a viral infection of the liver. Hepatitis C can cause a chronic infection that can increase your risk of developing worsening liver damage and liver cancer. Hepatitis C can be treated to prevent these risks. You also were found to not have immunity against Hepatitis B, which is also a virus of the liver that can increase your risk of liver cancer. Please ask about Hepatitis C treatment and Hepatitis B prevention at your appointment with our internal medicine clinic on Friday, August 31st at 10AM.  Secondary Diagnosis:	Obstructive nephropathy  Goal:	Management  Assessment and plan of treatment:	We found that your kidneys were damaged due to an obstruction in the urinary tract. Our urology team put stents in your urinary tract to remove the obstruction. Your kidney function has since improved but still is not at your baseline level of functioning.  Your kidney function will be rechecked in our outpatient medicine clinic at your next appointment. We made you an appointment with our urology team (Dr. Valverde). Your appointment is on September 28th at 9:30AM. You might be contacted by Dr. Valverde's office later to see if your appointment can be made sooner. If you notice more blood in your urine or that your are urinating less frequently than usual, please contact the urologist or come back to the emergency department.  Secondary Diagnosis:	DVT of lower limb, acute  Goal:	Resolution  Assessment and plan of treatment:	We found that you have a clot in your leg from the ultrasound of the lower extremities. The treatment of this clot is typically with anticoagulation, such as Eliquis. We recommended treating you with Eliquis 5mg twice per day but you refused treatement at this time.  Secondary Diagnosis:	Transition of care performed with sharing of clinical summary  Goal:	Continuity of Care  Assessment and plan of treatment:	1) PCP Contacted on Admission: (Y/N) --> Name & Phone #:  2) Date of Contact with PCP:  3) PCP Contacted at Discharge: (Y/N, N/A): N/A - patient without PCP  4) Summary of Handoff Given to PCP:   5) Post-Discharge Appointment Date and Location:  [facility] Internal Medicine Clinic: Friday August 31st at 10AM  Dr. Whalen (Hematology Oncology): September 5th at 3PM.  Dr. Valverde (Urology) appointment is on September 28th at 9:30AM.

## 2018-08-27 NOTE — PROGRESS NOTE ADULT - ASSESSMENT
57 yo M presenting with b/l lower ext edema and diffuse lymphadenopathy apparent on CT imaging, concerning for underlying lymphoproliferative disorder and incidental finding of prostate cancer w/ mets to bone on bone marrow biopsy    # Lymphadenopathy  - CT w/ evidence diffuse LAD in left supraclavicular region, left axilla, posterior mediastinal and retroperitoneum with bulky extensive periaortic and interaortocaval lymphadenopathy concerning for lymphoproliferative disorder. elevated LDH and uric acid consistent with underlying highly proliferative malignancy, haptoglobin normal- no evidence of hemolysis. Peripheral smear shows atypical lymphocytes.  - PET/CT shows Extensive bulky hypermetabolic lymphadenopathy in the neck, chest, abdomen and pelvis. Findings consistent with lymphoma. There is also involvement of osseous structures and probably the right kidney.  - s/p bx with ENT of supraclavicular LAD, this was an incisional bx which may be suboptimal. will discuss with pathology.   - Hep B: non reactive. Hep C: reactive (consistent w/ history)- viral load high  - perform 2D Echocardiogram- performed, showed normal EF  - urology consult for obstructive nephropathy 2/2 to bulky RP LAD: now s/p B/l ureteral stents, continues to have hematuria- heparin gtt stopped  - bone marrow bx performed 8/20 prelim result showed no evidence of lymphoma involvement but does show that there is carcinoma present, IHC stains suggestive of prostate adenocarcinoma      # Prostate adenocarcinoma  - incidental finding from pathology of bone marrow biopsy, PSA level 750, testosterone level pending.  - nuclear medicine bone scan recommended to clearly delineate extent of bony mets. On PET/CT, there is no evidence of visceral mets (lung/liver). PET/CT is not ideal imaging modality to assess for prostate cancer staging.   - will hold off on starting hormonal therapy w/ casodex + ADT until tissue diagnosis of supraclavicular LAD is available. For pt's hormone naive metastate prostate cancer, pt will therapy with ADT + docetaxel based on STAMPEDE/CHARTED trial or ADT + abiraterone based on LATITUDE study. we will discuss with pt, his options as an outpt.  - It is likely pt has two different malignancies going on at same time (lymphoma + prostate cancer)    # DVT  - LE dopplers show thrombosis of a left calf muscular vein, hold of on lovenox therapy due to renal failure and need for excisional bx for dx of lymphoma  - heparin gtt on hold due to drop in H&H from ongoing hematuria.    # -normocytic anemia, peripheral smear reviewed  - etiology likely dilutional + chronic hematuria while on hep gtt, iron studies, Vit B12 and folate levels adequate  - trend cbc, transfuse for Hgb <7  - discuss with urology whether ongoing hematuria despite being off heparin gtt is 2/2 to a mechanical urological issue. 57 yo M presenting with b/l lower ext edema and diffuse lymphadenopathy apparent on CT imaging, concerning for underlying lymphoproliferative disorder and incidental finding of prostate cancer w/ mets to bone on bone marrow biopsy.     # Lymphadenopathy: CT w/ evidence diffuse LAD in left supraclavicular region, left axilla, posterior mediastinal and retroperitoneum with bulky extensive periaortic and interaortocaval lymphadenopathy concerning for lymphoproliferative disorder. elevated LDH and uric acid consistent with underlying highly proliferative malignancy, haptoglobin normal- no evidence of hemolysis. Peripheral smear shows atypical lymphocytes. PET/CT shows Extensive bulky hypermetabolic lymphadenopathy in the neck, chest, abdomen and pelvis. Findings consistent with lymphoma. There is also involvement of osseous structures and probably the right kidney. Bone marrow bx performed 8/20 prelim result showed no evidence of lymphoma involvement but does show that there is carcinoma present, IHC stains suggestive of prostate adenocarcinoma.   - s/p bx with ENT of supraclavicular LAD, this was an incisional bx which may be suboptimal. awaiting pathology   - urology following for obstructive nephropathy 2/2 to bulky RP LAD: now s/p B/l ureteral stents, hematuria resolved w/ stabilization of Cr around 1.7    # Prostate adenocarcinoma: incidental finding from pathology of bone marrow biopsy, PSA level 750.  Nuclear medicine bone scan w/ multiple foci of increased radioisotope uptake is seen including the distal diaphysis of the left femur, mid diaphysis of the right humerus, right scapula and upper left rib.  - testosterone level pending  - will hold off on starting hormonal therapy w/ casodex + ADT until tissue diagnosis of supraclavicular LAD is available. For pt's hormone naive metastate prostate cancer, pt will need therapy with ADT + docetaxel based on STAMPEDE/CHARTED trial or ADT + abiraterone based on LATITUDE study. we will discuss with pt his options as an outpt.  - It is likely pt has two different malignancies going on at same time (lymphoma + prostate cancer)    # DVT: LE dopplers show thrombosis of a left calf muscular vein, Hematuria resolved after heparin gtt stopped.  - Pt to be started on Eliquis by primary team    # -normocytic anemia, peripheral smear reviewed: Etiology likely dilutional + chronic hematuria while on hep gtt, iron studies, Vit B12 and folate levels adequate  - trend cbc, transfuse for Hgb <7 59 yo M presenting with b/l lower ext edema and diffuse lymphadenopathy apparent on CT imaging, concerning for underlying lymphoproliferative disorder and incidental finding of prostate cancer w/ mets to bone on bone marrow biopsy.     # Lymphadenopathy: CT w/ evidence diffuse LAD in left supraclavicular region, left axilla, posterior mediastinal and retroperitoneum with bulky extensive periaortic and interaortocaval lymphadenopathy concerning for lymphoproliferative disorder. elevated LDH and uric acid consistent with underlying highly proliferative malignancy, haptoglobin normal- no evidence of hemolysis. Peripheral smear shows atypical lymphocytes. PET/CT shows Extensive bulky hypermetabolic lymphadenopathy in the neck, chest, abdomen and pelvis. Findings consistent with lymphoma. There is also involvement of osseous structures and probably the right kidney. Bone marrow bx performed 8/20 prelim result showed no evidence of lymphoma involvement but does show that there is carcinoma present, IHC stains suggestive of prostate adenocarcinoma.   - s/p bx with ENT of supraclavicular LAD, this was an incisional bx which may be suboptimal. awaiting pathology   - urology following for obstructive nephropathy 2/2 to bulky RP LAD: now s/p B/l ureteral stents, hematuria resolved w/ stabilization of Cr around 1.7    # Prostate adenocarcinoma: incidental finding from pathology of bone marrow biopsy, PSA level 750.  Nuclear medicine bone scan w/ multiple foci of increased radioisotope uptake is seen including the distal diaphysis of the left femur, mid diaphysis of the right humerus, right scapula and upper left rib.  - testosterone level pending  - will hold off on starting hormonal therapy w/ casodex + ADT until tissue diagnosis of supraclavicular LAD is available. For pt's hormone naive metastate prostate cancer, pt will need therapy with ADT + docetaxel based on STAMPEDE/CHARTED trial or ADT + abiraterone based on LATITUDE study. we will discuss with pt his options as an outpt.  - It is likely pt has two different malignancies going on at same time (lymphoma + prostate cancer)    # DVT: LE dopplers show thrombosis of a left calf muscular vein, Hematuria resolved after heparin gtt stopped.  - Pt to be started on Eliquis by primary team if pt is agreeable     # -normocytic anemia, peripheral smear reviewed: Etiology likely dilutional + chronic hematuria while on hep gtt, iron studies, Vit B12 and folate levels adequate  - trend cbc, transfuse for Hgb <7      Fellow Addendum  Pt seen and examined by me at bedside. Agree with assessment and plan above. s/p incisional bx of suprclavicular LN, pending path. confirmed metastatic prostate cancer on bone marrow bx- we will hold off on starting anti-androgen therapy as inpt. plan is for pt to follow up on Sept 5th 2018 and be started on casodex therapy + ADT. Pt refuses to go home on AC w/ eliquis despite repeat doppler showing persistant DVT. Advised that this may be life threatening and lead to fatal pulmonary emboli that could lead to death. Pt still refuses to go home on AC as he is concerned about having blood in urine again.

## 2018-08-27 NOTE — PROGRESS NOTE ADULT - PROVIDER SPECIALTY LIST ADULT
ENT
Heme/Onc
Hospitalist
Internal Medicine
Surgery
Surgery
Urology
Heme/Onc
Heme/Onc
Urology

## 2018-08-27 NOTE — DISCHARGE NOTE ADULT - HOSPITAL COURSE
58y M w/ PMH of Hep C and alcohol abuse presenting with persistent leg swelling for the last 3 weeks, intermittent hematuria, and unintentional 40lb weight loss over unclear time frame. In the ED patient's vitals were wnl except for a mildly hypertensive BP at 141/72. Labs were notable for Hgb 10.8, monocytosis of 14.2, and Cr of 3.11 up from a baseline around 1.36. UA showed moderate blood with no signs of infection. Imaging revealed diffuse lymphadenopathy and b/l hydronephrosis as well as DVT of the L calf vein. Pt underwent bilateral ureteral stent placement by urology, KIRSTEN hua. Sher inserted, draining red urine. Heme/onc did a bone marrow biopsy, results pending. Pt underwent PET scan to assess for possible source of excisional biopsy, but surgery did not find a suitable site, so they requested that ENT carry out the biopsy. ENT was consulted and will see pt in the AM. 58y M w/ PMH of Hep C and alcohol abuse presenting with persistent leg swelling for the last 3 weeks, intermittent hematuria, and unintentional 40lb weight loss over unclear time frame. In the ED patient's vitals were wnl except for a mildly hypertensive BP at 141/72. Labs were notable for Hgb 10.8, monocytosis of 14.2, and Cr of 3.11 up from a baseline around 1.36. UA showed moderate blood with no signs of infection. Imaging revealed diffuse lymphadenopathy and b/l hydronephrosis as well as DVT of the L calf vein. Pt underwent bilateral ureteral stent placement by urology, KIRSTEN improved. Sher inserted, drained red urine. Heme/onc did a bone marrow biopsy, demonstrated prostatic adenocarcinoma. Sher d/nhi, TOV passed, patient still with hematuria. Pt underwent PET scan to assess for possible source of excisional biopsy, but surgery did not find a suitable site, so they requested that ENT carry out the biopsy which was performed on 8/24, results pending. Patient initially on heparin gtt for DVT treatment, but in the setting of dropping hemoglobin and persistent hematuria it was eventually d/nhi. Bone scan performed, showed metastatic disease to left femur, right humerus, right scapula, and upper left rib. Hemoglobin remained stable and no longer with hematuria. KIRSTEN continued to improve on IV NS, which was d/nhi, Creatinine remained stable with last Cr prior to discharge 1.79. Repeat US of lower extremity demonstrated no progression of known DVT, patient to be d/nhi on Eliquis 5mg BID for indefinite time period given cancer history. Patient stable for discharge home with 58y M w/ PMH of Hep C and alcohol abuse presenting with persistent leg swelling for the last 3 weeks, intermittent hematuria, and unintentional 40lb weight loss over unclear time frame. In the ED patient's vitals were wnl except for a mildly hypertensive BP at 141/72. Labs were notable for Hgb 10.8, monocytosis of 14.2, and Cr of 3.11 up from a baseline around 1.36. UA showed moderate blood with no signs of infection. Imaging revealed diffuse lymphadenopathy and b/l hydronephrosis as well as DVT of the L calf vein. Pt underwent bilateral ureteral stent placement by urology, KIRSTEN improved. Sher inserted, drained red urine. Heme/onc did a bone marrow biopsy, demonstrated prostatic adenocarcinoma. Sher d/nhi, TOV passed, patient still with hematuria. Pt underwent PET scan to assess for possible source of excisional biopsy, but surgery did not find a suitable site, so they requested that ENT carry out the biopsy. Left supraclavicular lymph node biopsied by ENT on 8/24, results pending, likely due to metastatic prostate cancer but Heme/Onc also considering concomittant primary lymphoma. Patient initially on heparin gtt for DVT treatment, but in the setting of dropping hemoglobin and persistent hematuria it was eventually d/nhi. Bone scan performed, showed metastatic disease to left femur, right humerus, right scapula, and upper left rib. Hemoglobin remained stable and no longer with hematuria. KIRSTEN continued to improve on IV NS, which was d/nhi, Creatinine remained stable with last Cr prior to discharge 1.79. Repeat US of lower extremity demonstrated no progression of known DVT, patient to be d/nhi on Eliquis 5mg BID for indefinite time period given cancer history. Patient stable for discharge home with close outpatient follow up with [facility] on 8/31 for CBC and BMP, Dr. Whalen (Heme/Onc) on 9/4 and Dr. Valverde - urology on 9/28. 58y M w/ PMH of Hep C and alcohol abuse presenting with persistent leg swelling for the last 3 weeks, intermittent hematuria, and unintentional 40lb weight loss over unclear time frame. In the ED patient's vitals were wnl except for a mildly hypertensive BP at 141/72. Labs were notable for Hgb 10.8, monocytosis of 14.2, and Cr of 3.11 up from a baseline around 1.36. UA showed moderate blood with no signs of infection. Imaging revealed diffuse lymphadenopathy and b/l hydronephrosis as well as DVT of the L calf vein. Pt underwent bilateral ureteral stent placement by urology, KIRSTEN improved. Sher inserted, drained red urine. Heme/onc did a bone marrow biopsy, demonstrated prostatic adenocarcinoma. Sher d/nhi, TOV passed, patient still with hematuria. Pt underwent PET scan to assess for possible source of excisional biopsy, but surgery did not find a suitable site, so they requested that ENT carry out the biopsy. Left supraclavicular lymph node biopsied by ENT on 8/24, results pending, likely due to metastatic prostate cancer but Heme/Onc also considering concomittant primary lymphoma. Patient initially on heparin gtt for DVT treatment, but in the setting of dropping hemoglobin and persistent hematuria it was eventually d/nhi. Bone scan performed, showed metastatic disease to left femur, right humerus, right scapula, and upper left rib. Hemoglobin remained stable and no longer with hematuria. KIRSTEN continued to improve on IV NS, which was d/nhi, Creatinine remained stable with last Cr prior to discharge 1.79. Repeat US of lower extremity demonstrated no progression of known DVT. Patient stable for discharge home with close outpatient follow up with [facility] on 8/31 for CBC and BMP, Dr. Whalen (Heme/Onc) on 9/4 and Dr. Valverde - urology on 9/28. Primary care team wanted to discharge patient on eliquis 5mg BID indefinitely due to cancer history but patient refused. Risks and benefits of AC for DVT treatment, including worsening DVT, PE, and death, were discussed but patient still refusing treatement at this time as he wants to consult with outside providers prior to treatment.

## 2018-08-27 NOTE — DISCHARGE NOTE ADULT - CARE PROVIDER_API CALL
Marisela Whalen), HematologyOncology; Internal Medicine  [address] [address]  Phone: [phone]  Fax: [phone]    Donta Valverde), Urology  [address] [place] [address]  Phone: [phone]  Fax: [phone] Marisela Whalen), HematologyOncology; Internal Medicine  [address] [address]  Phone: [phone]  Fax: [phone]    Donta Valverde), Urology  [address] [place] [address]  Phone: [phone]  Fax: [phone]    [facility]  [address] [place] [address]  Phone: [phone]  Fax: [phone]

## 2018-08-27 NOTE — PROGRESS NOTE ADULT - SUBJECTIVE AND OBJECTIVE BOX
Hematology Oncology Progress Note    SUBJECTIVE / INTERVAL HPI: Patient seen and examined at bedside. Pt reports hematuria has resolved w/ adequate UOP.  Continues to have LE edema and scrotal swelling.  Appetite intact.  ROS negative for nausea vomiting, CP, SOB.     PHYSICAL EXAM:    T(F): 98.1 (08-27-18 @ 10:28), Max: 99.3 (08-27-18 @ 05:36)  HR: 89 (08-27-18 @ 10:28) (82 - 89)  BP: 123/79 (08-27-18 @ 10:28) (122/79 - 145/83)  RR: 16 (08-27-18 @ 10:28) (14 - 16)  SpO2: 98% (08-27-18 @ 10:28) (94% - 98%)  Wt(kg): --    General: well appearing, lying in bed, and in no acute distress  Skin: (+) venous stasis changes to BLE  HEENT: normocephalic, atraumatic, PERRL, anicteric sclera; no conjunctival pallor, mucus membranes moist  Neck: supple, no JVD  Cardiovascular: +S1/S2, regular rate and rhythm; no murmurs, no rub, no gallop  Respiratory: clear to auscultation bilaterally, decreased breath sounds B/l. no rhonchi, no wheeze, no rales  Gastrointestinal: soft, active bowel sounds, non-tender, non-distended  Extremities: Warm, dry; no clubbing or cyanosis, (+) 4+ pitting edema to BLE predominantly from feet up to the knees but also mid thigh edema noted though not as severe. RLE > LLE. No tenderness. (+) L elbow w/ soft bulbous mass without any tenderness to palpation  Lymph nodes: (+) large palpable L supraclavicular LAD, (+) L axillary and b/l inguinal LAD  Vascular: 2+ radial, DP pulses bilaterally  Neurological: AAOx3; no focal deficits    Labs:                          8.5    6.6   )-----------( 196      ( 27 Aug 2018 06:02 )             26.1     CBC Full  -  ( 27 Aug 2018 06:02 )  WBC Count : 6.6 K/uL  Hemoglobin : 8.5 g/dL  Hematocrit : 26.1 %  Platelet Count - Automated : 196 K/uL  Mean Cell Volume : 98.9 fL  Mean Cell Hemoglobin : 32.2 pg  Mean Cell Hemoglobin Concentration : 32.6 g/dL  Auto Neutrophil # : x  Auto Lymphocyte # : x  Auto Monocyte # : x  Auto Eosinophil # : x  Auto Basophil # : x  Auto Neutrophil % : x  Auto Lymphocyte % : x  Auto Monocyte % : x  Auto Eosinophil % : x  Auto Basophil % : x        08-27    135  |  100  |  28<H>  ----------------------------<  91  4.9   |  22  |  1.79<H>    Ca    8.8      27 Aug 2018 06:02  Phos  4.9     08-27  Mg     1.8     08-27        Other Labs:    Pathology:    Imaging Studies: Hematology Oncology Progress Note    SUBJECTIVE / INTERVAL HPI: Patient seen and examined at bedside. Pt reports hematuria has resolved w/ adequate UOP.  Continues to have LE edema and scrotal swelling.  Appetite intact.  ROS negative for nausea vomiting, CP, SOB.     PHYSICAL EXAM:    T(F): 98.1 (08-27-18 @ 10:28), Max: 99.3 (08-27-18 @ 05:36)  HR: 89 (08-27-18 @ 10:28) (82 - 89)  BP: 123/79 (08-27-18 @ 10:28) (122/79 - 145/83)  RR: 16 (08-27-18 @ 10:28) (14 - 16)  SpO2: 98% (08-27-18 @ 10:28) (94% - 98%)  Wt(kg): --    General: well appearing, lying in bed, and in no acute distress  Skin: (+) venous stasis changes to BLE  HEENT: normocephalic, atraumatic, PERRL, anicteric sclera; no conjunctival pallor, mucus membranes moist  Neck: supple, no JVD  Cardiovascular: +S1/S2, regular rate and rhythm; no murmurs, no rub, no gallop  Respiratory: clear to auscultation bilaterally, decreased breath sounds B/l. no rhonchi, no wheeze, no rales  Gastrointestinal: soft, active bowel sounds, non-tender, non-distended  Extremities: Warm, dry; no clubbing or cyanosis, (+) 4+ pitting edema to BLE predominantly from feet up to the knees but also mid thigh edema noted though not as severe. RLE > LLE. No tenderness. (+) L elbow w/ soft bulbous mass without any tenderness to palpation  Lymph nodes: (+) large palpable L supraclavicular LAD, (+) L axillary and b/l inguinal LAD  Vascular: 2+ radial, DP pulses bilaterally  Neurological: AAOx3; no focal deficits    Labs:                          8.5    6.6   )-----------( 196      ( 27 Aug 2018 06:02 )             26.1     CBC Full  -  ( 27 Aug 2018 06:02 )  WBC Count : 6.6 K/uL  Hemoglobin : 8.5 g/dL  Hematocrit : 26.1 %  Platelet Count - Automated : 196 K/uL  Mean Cell Volume : 98.9 fL  Mean Cell Hemoglobin : 32.2 pg  Mean Cell Hemoglobin Concentration : 32.6 g/dL  Auto Neutrophil # : x  Auto Lymphocyte # : x  Auto Monocyte # : x  Auto Eosinophil # : x  Auto Basophil # : x  Auto Neutrophil % : x  Auto Lymphocyte % : x  Auto Monocyte % : x  Auto Eosinophil % : x  Auto Basophil % : x    08-27    135  |  100  |  28<H>  ----------------------------<  91  4.9   |  22  |  1.79<H>    Ca    8.8      27 Aug 2018 06:02  Phos  4.9     08-27  Mg     1.8     08-27    < from: NM PET/CT Onc FDG Skull to Thigh, Inital (08.21.18 @ 11:30) >  Findings: There is extensive FDG avid lymphadenopathy. There is   hypermetabolic left cervical, supraclavicular, axillary, and   para-aortic/retroperitoneal lymphadenopathy in the abdomen and pelvis.   There is also hypermetabolic lymphadenopathy extending along the internal   and external inguinal lymph node groups.    Increased FDG activity is also noted in the left posterior fifth rib,   short segments of multiple other ribs, right scapula, right L5 pedicle.    Irregular FDG activity is also seen throughout the right kidney in a   pattern suggesting infiltration of the renal parenchyma.    There is a 2.0 cm sialolith in the right submandibular gland.    The heart is normal in size.  No pericardial effusion is seen.     Small bilateral pleural effusions are seen, right greater than left with   compressive atelectasis of the right basal lung zone.     Images of the upper abdomen demonstrate no focal hepatic abnormalities.     No radiopaque stones are seen in the gallbladder.  The pancreas is normal   in appearance.  No splenic abnormalities are seen.    2.5 cm right adrenal nodule. Left adrenal gland is unremarkable. There   has been interval placement of bilateral ureteral stents with improvement   in bilateral hydronephrosis. Noabdominal aortic aneurysm is seen. No   lymphadenopathy is seen.     Evaluation of the bowel demonstrates no abnormalities. There is pelvic   ascites.    Images of the pelvis demonstrate normal prostate.    Impression: Extensive bulky hypermetabolic lymphadenopathy in the neck,   chest, abdomen and pelvis. Findings consistent with lymphoma. There is   also involvement of osseous structures and probably the right kidney.    < end of copied text >

## 2018-08-27 NOTE — DISCHARGE NOTE ADULT - CARE PROVIDERS DIRECT ADDRESSES
,DirectAddress_Unknown,[email or web address] ,DirectAddress_Unknown,[email or web address],[email or web address]

## 2018-08-27 NOTE — DISCHARGE NOTE ADULT - ADDITIONAL INSTRUCTIONS
We made you an appointment with [facility]'s outpatient internal medicine clinic on Friday, August 31st at 10AM. Please attend this appointment and get a CBC and BMP to check your hemoglobin and creatinine levels. Please discuss treatment options for Hepatitis C as well as obtaining a vaccination for Hepatitis B prevention.  Please go to the appointment with Dr. Whalen (Hematology/Oncology team) that we made for you on September 4th at 2PM to discuss your biopsy results and treatment options for prostate cancer.  Please attend your appointment that we made you with Dr. Valverde (Urologist) on September 28th at 9:30AM. Dr. Valverde's  will call you if they can get you an appointment at an earlier date. We made you an appointment with [facility]'s outpatient internal medicine clinic on Friday, August 31st at 10AM. Please attend this appointment and get a CBC and BMP to check your hemoglobin and creatinine levels. Please discuss treatment options for Hepatitis C as well as obtaining a vaccination for Hepatitis B prevention.  Please go to the appointment with Dr. Whalen (Hematology/Oncology team) that we made for you on September 4th at 2PM to discuss your biopsy results and treatment options for prostate cancer.  Please attend your appointment that we made you with Dr. Valverde (Urologist) on September 28th at 9:30AM. Dr. Valverde's  will call you if they can get you an appointment at an earlier date.  We prescribed you Eliquis and that was sent to your pharmacy. Please  this medication. Please use this medication as prescribed, take one 5mg tablet by mouth two times per day.   We prescribed you Allopurinol and that was sent to your pharmacy. Please  this medication and take it as prescribed, one tablet one time per day. We made you an appointment with [facility]'s outpatient internal medicine clinic on Friday, August 31st at 10AM. Please attend this appointment and get a CBC and BMP to check your hemoglobin and creatinine levels. Please discuss treatment options for Hepatitis C as well as obtaining a vaccination for Hepatitis B prevention. Please obtain a hematology/oncology referral at this appointment so you can attend your appointment with Dr. Whalen on 9/4/18.   Please go to the appointment with Dr. Whalen (Hematology/Oncology team) that we made for you on September 4th at 2PM to discuss your biopsy results and treatment options for prostate cancer. Please get a referral from your internal medicine appointment on 8/31. Please bring your insurance card,   Please attend your appointment that we made you with Dr. Valverde (Urologist) on September 28th at 9:30AM. Dr. Valverde's  will call you if they can get you an appointment at an earlier date.  We prescribed you Allopurinol and that was sent to your pharmacy. Please  this medication and take it as prescribed, one tablet one time per day. We made you an appointment with [facility]'s outpatient internal medicine clinic on Friday, August 31st at 10AM. Please attend this appointment and get a CBC and BMP to check your hemoglobin and creatinine levels. Please discuss treatment options for Hepatitis C as well as obtaining a vaccination for Hepatitis B prevention. Please obtain a hematology/oncology referral at this appointment so you can attend your appointment with Dr. Whalen on 9/5/18.   Please go to the appointment with Dr. Whalen (Hematology/Oncology team) that we made for you on September 5th at 3PM to discuss your biopsy results and treatment options for prostate cancer. Please get a referral from your internal medicine appointment on 8/31. Please bring your insurance card, photo ID, and referral from the internal medicine doctor.  Please attend your appointment that we made you with Dr. Valverde (Urologist) on September 28th at 9:30AM. Dr. Valverde's  will call you if they can get you an appointment at an earlier date.  We prescribed you Allopurinol and that was sent to your pharmacy. Please  this medication and take it as prescribed, one tablet one time per day.

## 2018-08-27 NOTE — PROGRESS NOTE ADULT - ASSESSMENT
A/P: 58M presenting with bilateral hydronephrosis secondary to pelvic lymphadenopathy POD#7 s/p bilateral ureteral stent placement found to have metastatic prostate cancer on bone marrow biopsy. Primary team, heme/onc, and urology have discussed metastatic prostate cancer treatment options with the patient.  Hem/onc will decide and begin specific treatment in the outpatient setting, and not begin treatment now.  H/H seems to be stabilizing and Cr seems to be generally downtrending.  - F/U with Dr. Valverde in clinic in 3 months to discuss bilateral stent exchange  - Continue to monitor H/H and Cr while inpatient  - F/U bone scan

## 2018-08-27 NOTE — PROGRESS NOTE ADULT - SUBJECTIVE AND OBJECTIVE BOX
ID: [id number] presenting with bilateral hydronephrosis secondary to pelvic lymphadenopathy POD#7 s/p bilateral ureteral stent placement found to have metastatic prostate cancer on bone marrow biopsy    24 Hour Events: NETO    SUBJECTIVE: Patient urinating well with no dysuria or LUTS.  Patient also states that scrotal pain has resolved.      OBJECTIVE:    Vital Signs:  Vital Signs Last 24 Hrs  T(C): 36.7 (27 Aug 2018 10:28), Max: 37.4 (27 Aug 2018 05:36)  T(F): 98.1 (27 Aug 2018 10:28), Max: 99.3 (27 Aug 2018 05:36)  HR: 89 (27 Aug 2018 10:28) (82 - 89)  BP: 123/79 (27 Aug 2018 10:28) (122/79 - 145/83)  BP(mean): --  RR: 16 (27 Aug 2018 10:28) (14 - 16)  SpO2: 98% (27 Aug 2018 10:28) (94% - 98%)    Physical Exam:  General: NAD  Pulmonary: Nonlabored breathing, no respiratory distress  Cardiovascular: No heaves/thrills  Abdominal: Soft, NT/ND  Extremities: WWP, no clubbing/cyanosis/edema  Neuro: AAO x3, no focal deficits  : straw colored urine in urinal, scrotal edema but no tenderness, no suprapubic tenderness      Ins and Outs:  I&O's Summary    26 Aug 2018 07:01  -  27 Aug 2018 07:00  --------------------------------------------------------  IN: 315 mL / OUT: 900 mL / NET: -585 mL        Labs:                        8.5    6.6   )-----------( 196      ( 27 Aug 2018 06:02 )             26.1     08-27    135  |  100  |  28<H>  ----------------------------<  91  4.9   |  22  |  1.79<H>    Ca    8.8      27 Aug 2018 06:02  Phos  4.9     08-27  Mg     1.8     08-27          CAPILLARY BLOOD GLUCOSE            Radiology & Additional Studies:

## 2018-08-28 LAB — SURGICAL PATHOLOGY STUDY: SIGNIFICANT CHANGE UP

## 2018-08-28 PROCEDURE — 82570 ASSAY OF URINE CREATININE: CPT

## 2018-08-28 PROCEDURE — 36415 COLL VENOUS BLD VENIPUNCTURE: CPT

## 2018-08-28 PROCEDURE — 76000 FLUOROSCOPY <1 HR PHYS/QHP: CPT

## 2018-08-28 PROCEDURE — 82553 CREATINE MB FRACTION: CPT

## 2018-08-28 PROCEDURE — 93005 ELECTROCARDIOGRAM TRACING: CPT

## 2018-08-28 PROCEDURE — 84550 ASSAY OF BLOOD/URIC ACID: CPT

## 2018-08-28 PROCEDURE — 78306 BONE IMAGING WHOLE BODY: CPT

## 2018-08-28 PROCEDURE — 83010 ASSAY OF HAPTOGLOBIN QUANT: CPT

## 2018-08-28 PROCEDURE — 84540 ASSAY OF URINE/UREA-N: CPT

## 2018-08-28 PROCEDURE — 80048 BASIC METABOLIC PNL TOTAL CA: CPT

## 2018-08-28 PROCEDURE — 87086 URINE CULTURE/COLONY COUNT: CPT

## 2018-08-28 PROCEDURE — 80074 ACUTE HEPATITIS PANEL: CPT

## 2018-08-28 PROCEDURE — 97116 GAIT TRAINING THERAPY: CPT

## 2018-08-28 PROCEDURE — 85097 BONE MARROW INTERPRETATION: CPT

## 2018-08-28 PROCEDURE — A9503: CPT

## 2018-08-28 PROCEDURE — 86140 C-REACTIVE PROTEIN: CPT

## 2018-08-28 PROCEDURE — 87521 HEPATITIS C PROBE&RVRS TRNSC: CPT

## 2018-08-28 PROCEDURE — 84300 ASSAY OF URINE SODIUM: CPT

## 2018-08-28 PROCEDURE — 87389 HIV-1 AG W/HIV-1&-2 AB AG IA: CPT

## 2018-08-28 PROCEDURE — A9552: CPT

## 2018-08-28 PROCEDURE — 84154 ASSAY OF PSA FREE: CPT

## 2018-08-28 PROCEDURE — 85045 AUTOMATED RETICULOCYTE COUNT: CPT

## 2018-08-28 PROCEDURE — 83935 ASSAY OF URINE OSMOLALITY: CPT

## 2018-08-28 PROCEDURE — 88313 SPECIAL STAINS GROUP 2: CPT

## 2018-08-28 PROCEDURE — 88305 TISSUE EXAM BY PATHOLOGIST: CPT

## 2018-08-28 PROCEDURE — 88311 DECALCIFY TISSUE: CPT

## 2018-08-28 PROCEDURE — 84403 ASSAY OF TOTAL TESTOSTERONE: CPT

## 2018-08-28 PROCEDURE — 85025 COMPLETE CBC W/AUTO DIFF WBC: CPT

## 2018-08-28 PROCEDURE — 81001 URINALYSIS AUTO W/SCOPE: CPT

## 2018-08-28 PROCEDURE — 84153 ASSAY OF PSA TOTAL: CPT

## 2018-08-28 PROCEDURE — 82962 GLUCOSE BLOOD TEST: CPT

## 2018-08-28 PROCEDURE — 82378 CARCINOEMBRYONIC ANTIGEN: CPT

## 2018-08-28 PROCEDURE — C1758: CPT

## 2018-08-28 PROCEDURE — 86900 BLOOD TYPING SEROLOGIC ABO: CPT

## 2018-08-28 PROCEDURE — 85610 PROTHROMBIN TIME: CPT

## 2018-08-28 PROCEDURE — 85730 THROMBOPLASTIN TIME PARTIAL: CPT

## 2018-08-28 PROCEDURE — 76770 US EXAM ABDO BACK WALL COMP: CPT

## 2018-08-28 PROCEDURE — 84100 ASSAY OF PHOSPHORUS: CPT

## 2018-08-28 PROCEDURE — 97162 PT EVAL MOD COMPLEX 30 MIN: CPT

## 2018-08-28 PROCEDURE — 83880 ASSAY OF NATRIURETIC PEPTIDE: CPT

## 2018-08-28 PROCEDURE — 84402 ASSAY OF FREE TESTOSTERONE: CPT

## 2018-08-28 PROCEDURE — 86301 IMMUNOASSAY TUMOR CA 19-9: CPT

## 2018-08-28 PROCEDURE — 82607 VITAMIN B-12: CPT

## 2018-08-28 PROCEDURE — C1769: CPT

## 2018-08-28 PROCEDURE — 71250 CT THORAX DX C-: CPT

## 2018-08-28 PROCEDURE — 93970 EXTREMITY STUDY: CPT

## 2018-08-28 PROCEDURE — 71046 X-RAY EXAM CHEST 2 VIEWS: CPT

## 2018-08-28 PROCEDURE — 82728 ASSAY OF FERRITIN: CPT

## 2018-08-28 PROCEDURE — 83550 IRON BINDING TEST: CPT

## 2018-08-28 PROCEDURE — 80053 COMPREHEN METABOLIC PANEL: CPT

## 2018-08-28 PROCEDURE — 93971 EXTREMITY STUDY: CPT

## 2018-08-28 PROCEDURE — 82105 ALPHA-FETOPROTEIN SERUM: CPT

## 2018-08-28 PROCEDURE — 93306 TTE W/DOPPLER COMPLETE: CPT

## 2018-08-28 PROCEDURE — 84704 HCG FREE BETACHAIN TEST: CPT

## 2018-08-28 PROCEDURE — 83735 ASSAY OF MAGNESIUM: CPT

## 2018-08-28 PROCEDURE — 83615 LACTATE (LD) (LDH) ENZYME: CPT

## 2018-08-28 PROCEDURE — 88341 IMHCHEM/IMCYTCHM EA ADD ANTB: CPT

## 2018-08-28 PROCEDURE — 74176 CT ABD & PELVIS W/O CONTRAST: CPT

## 2018-08-28 PROCEDURE — 99285 EMERGENCY DEPT VISIT HI MDM: CPT | Mod: 25

## 2018-08-28 PROCEDURE — 82550 ASSAY OF CK (CPK): CPT

## 2018-08-28 PROCEDURE — 85027 COMPLETE CBC AUTOMATED: CPT

## 2018-08-28 PROCEDURE — 78815 PET IMAGE W/CT SKULL-THIGH: CPT

## 2018-08-28 PROCEDURE — 85652 RBC SED RATE AUTOMATED: CPT

## 2018-08-28 PROCEDURE — 82746 ASSAY OF FOLIC ACID SERUM: CPT

## 2018-08-28 PROCEDURE — 88342 IMHCHEM/IMCYTCHM 1ST ANTB: CPT

## 2018-08-28 PROCEDURE — 86850 RBC ANTIBODY SCREEN: CPT

## 2018-08-28 PROCEDURE — 86901 BLOOD TYPING SEROLOGIC RH(D): CPT

## 2018-08-31 ENCOUNTER — APPOINTMENT (OUTPATIENT)
Dept: INTERNAL MEDICINE | Facility: CLINIC | Age: 58
End: 2018-08-31

## 2018-08-31 DIAGNOSIS — N17.0 ACUTE KIDNEY FAILURE WITH TUBULAR NECROSIS: ICD-10-CM

## 2018-08-31 DIAGNOSIS — F10.11 ALCOHOL ABUSE, IN REMISSION: ICD-10-CM

## 2018-08-31 DIAGNOSIS — N13.30 UNSPECIFIED HYDRONEPHROSIS: ICD-10-CM

## 2018-08-31 DIAGNOSIS — I82.4Z2 ACUTE EMBOLISM AND THROMBOSIS OF UNSPECIFIED DEEP VEINS OF LEFT DISTAL LOWER EXTREMITY: ICD-10-CM

## 2018-08-31 DIAGNOSIS — C85.90 NON-HODGKIN LYMPHOMA, UNSPECIFIED, UNSPECIFIED SITE: ICD-10-CM

## 2018-08-31 DIAGNOSIS — R63.4 ABNORMAL WEIGHT LOSS: ICD-10-CM

## 2018-08-31 DIAGNOSIS — D63.0 ANEMIA IN NEOPLASTIC DISEASE: ICD-10-CM

## 2018-08-31 DIAGNOSIS — C79.51 SECONDARY MALIGNANT NEOPLASM OF BONE: ICD-10-CM

## 2018-08-31 DIAGNOSIS — R91.8 OTHER NONSPECIFIC ABNORMAL FINDING OF LUNG FIELD: ICD-10-CM

## 2018-08-31 DIAGNOSIS — R59.9 ENLARGED LYMPH NODES, UNSPECIFIED: ICD-10-CM

## 2018-08-31 DIAGNOSIS — B19.20 UNSPECIFIED VIRAL HEPATITIS C WITHOUT HEPATIC COMA: ICD-10-CM

## 2018-08-31 DIAGNOSIS — F32.9 MAJOR DEPRESSIVE DISORDER, SINGLE EPISODE, UNSPECIFIED: ICD-10-CM

## 2018-08-31 DIAGNOSIS — C61 MALIGNANT NEOPLASM OF PROSTATE: ICD-10-CM

## 2018-08-31 DIAGNOSIS — N13.0 HYDRONEPHROSIS WITH URETEROPELVIC JUNCTION OBSTRUCTION: ICD-10-CM

## 2018-08-31 DIAGNOSIS — R31.9 HEMATURIA, UNSPECIFIED: ICD-10-CM

## 2018-08-31 DIAGNOSIS — D62 ACUTE POSTHEMORRHAGIC ANEMIA: ICD-10-CM

## 2018-08-31 DIAGNOSIS — C79.89 SECONDARY MALIGNANT NEOPLASM OF OTHER SPECIFIED SITES: ICD-10-CM

## 2018-08-31 DIAGNOSIS — E88.3 TUMOR LYSIS SYNDROME: ICD-10-CM

## 2018-09-07 ENCOUNTER — APPOINTMENT (OUTPATIENT)
Dept: INTERNAL MEDICINE | Facility: CLINIC | Age: 58
End: 2018-09-07
Payer: COMMERCIAL

## 2018-09-07 VITALS
WEIGHT: 214 LBS | HEART RATE: 100 BPM | HEIGHT: 73 IN | DIASTOLIC BLOOD PRESSURE: 79 MMHG | TEMPERATURE: 98.5 F | BODY MASS INDEX: 28.36 KG/M2 | SYSTOLIC BLOOD PRESSURE: 122 MMHG | OXYGEN SATURATION: 94 %

## 2018-09-07 DIAGNOSIS — F32.9 MAJOR DEPRESSIVE DISORDER, SINGLE EPISODE, UNSPECIFIED: ICD-10-CM

## 2018-09-07 DIAGNOSIS — N17.9 ACUTE KIDNEY FAILURE, UNSPECIFIED: ICD-10-CM

## 2018-09-07 DIAGNOSIS — R33.9 RETENTION OF URINE, UNSPECIFIED: ICD-10-CM

## 2018-09-07 PROCEDURE — 99214 OFFICE O/P EST MOD 30 MIN: CPT | Mod: GC

## 2018-09-18 ENCOUNTER — APPOINTMENT (OUTPATIENT)
Dept: ORTHOPEDIC SURGERY | Facility: CLINIC | Age: 58
End: 2018-09-18
Payer: COMMERCIAL

## 2018-09-18 DIAGNOSIS — M70.22 OLECRANON BURSITIS, LEFT ELBOW: ICD-10-CM

## 2018-09-18 PROCEDURE — 99203 OFFICE O/P NEW LOW 30 MIN: CPT

## 2018-09-18 PROCEDURE — 73070 X-RAY EXAM OF ELBOW: CPT | Mod: LT

## 2018-09-20 ENCOUNTER — INBOUND DOCUMENT (OUTPATIENT)
Age: 58
End: 2018-09-20

## 2018-10-10 ENCOUNTER — APPOINTMENT (OUTPATIENT)
Dept: PULMONOLOGY | Facility: CLINIC | Age: 58
End: 2018-10-10
Payer: COMMERCIAL

## 2018-10-10 VITALS
BODY MASS INDEX: 28.36 KG/M2 | TEMPERATURE: 98.5 F | WEIGHT: 214 LBS | OXYGEN SATURATION: 97 % | SYSTOLIC BLOOD PRESSURE: 114 MMHG | HEIGHT: 73 IN | HEART RATE: 68 BPM | DIASTOLIC BLOOD PRESSURE: 76 MMHG

## 2018-10-10 DIAGNOSIS — F51.04 PSYCHOPHYSIOLOGIC INSOMNIA: ICD-10-CM

## 2018-10-10 DIAGNOSIS — Z81.1 FAMILY HISTORY OF ALCOHOL ABUSE AND DEPENDENCE: ICD-10-CM

## 2018-10-10 DIAGNOSIS — R06.83 SNORING: ICD-10-CM

## 2018-10-10 DIAGNOSIS — Z78.9 OTHER SPECIFIED HEALTH STATUS: ICD-10-CM

## 2018-10-10 PROCEDURE — 99244 OFF/OP CNSLTJ NEW/EST MOD 40: CPT

## 2018-10-11 ENCOUNTER — OTHER (OUTPATIENT)
Age: 58
End: 2018-10-11

## 2018-10-15 ENCOUNTER — APPOINTMENT (OUTPATIENT)
Dept: UROLOGY | Facility: CLINIC | Age: 58
End: 2018-10-15
Payer: COMMERCIAL

## 2018-10-15 VITALS
TEMPERATURE: 98.1 F | OXYGEN SATURATION: 97 % | DIASTOLIC BLOOD PRESSURE: 74 MMHG | HEIGHT: 73 IN | BODY MASS INDEX: 25.18 KG/M2 | SYSTOLIC BLOOD PRESSURE: 119 MMHG | HEART RATE: 74 BPM | WEIGHT: 190 LBS

## 2018-10-15 PROCEDURE — 99214 OFFICE O/P EST MOD 30 MIN: CPT

## 2018-10-16 LAB
ALBUMIN SERPL ELPH-MCNC: 4.6 G/DL
ALP BLD-CCNC: 423 U/L
ALT SERPL-CCNC: 15 U/L
ANION GAP SERPL CALC-SCNC: 11 MMOL/L
AST SERPL-CCNC: 15 U/L
BASOPHILS # BLD AUTO: 0.07 K/UL
BASOPHILS NFR BLD AUTO: 1.4 %
BILIRUB SERPL-MCNC: 0.4 MG/DL
BUN SERPL-MCNC: 14 MG/DL
CALCIUM SERPL-MCNC: 9.3 MG/DL
CHLORIDE SERPL-SCNC: 99 MMOL/L
CO2 SERPL-SCNC: 28 MMOL/L
CREAT SERPL-MCNC: 0.94 MG/DL
EOSINOPHIL # BLD AUTO: 0.05 K/UL
EOSINOPHIL NFR BLD AUTO: 1 %
GLUCOSE SERPL-MCNC: 103 MG/DL
HCT VFR BLD CALC: 36.3 %
HGB BLD-MCNC: 12 G/DL
IMM GRANULOCYTES NFR BLD AUTO: 0.2 %
LYMPHOCYTES # BLD AUTO: 1.39 K/UL
LYMPHOCYTES NFR BLD AUTO: 27.3 %
MAN DIFF?: NORMAL
MCHC RBC-ENTMCNC: 32.3 PG
MCHC RBC-ENTMCNC: 33.1 GM/DL
MCV RBC AUTO: 97.8 FL
MONOCYTES # BLD AUTO: 0.64 K/UL
MONOCYTES NFR BLD AUTO: 12.5 %
NEUTROPHILS # BLD AUTO: 2.94 K/UL
NEUTROPHILS NFR BLD AUTO: 57.6 %
PLATELET # BLD AUTO: 248 K/UL
POTASSIUM SERPL-SCNC: 4.7 MMOL/L
PROT SERPL-MCNC: 7.9 G/DL
PSA SERPL-MCNC: 104.2 NG/ML
RBC # BLD: 3.71 M/UL
RBC # FLD: 13.2 %
SODIUM SERPL-SCNC: 138 MMOL/L
TESTOST SERPL-MCNC: <2.5 NG/DL
WBC # FLD AUTO: 5.1 K/UL

## 2018-10-16 RX ORDER — BICALUTAMIDE 50 MG/1
50 TABLET ORAL DAILY
Qty: 1 | Refills: 0 | Status: DISCONTINUED | COMMUNITY
Start: 2018-09-07 | End: 2018-10-16

## 2018-10-26 ENCOUNTER — OUTPATIENT (OUTPATIENT)
Dept: OUTPATIENT SERVICES | Facility: HOSPITAL | Age: 58
LOS: 1 days | End: 2018-10-26
Payer: COMMERCIAL

## 2018-10-26 ENCOUNTER — APPOINTMENT (OUTPATIENT)
Dept: SLEEP CENTER | Facility: HOSPITAL | Age: 58
End: 2018-10-26
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
## 2018-10-26 DIAGNOSIS — G47.33 OBSTRUCTIVE SLEEP APNEA (ADULT) (PEDIATRIC): ICD-10-CM

## 2018-10-26 DIAGNOSIS — S69.92XS UNSPECIFIED INJURY OF LEFT WRIST, HAND AND FINGER(S), SEQUELA: Chronic | ICD-10-CM

## 2018-10-26 PROCEDURE — 95810 POLYSOM 6/> YRS 4/> PARAM: CPT | Mod: 26

## 2018-10-26 PROCEDURE — 95810 POLYSOM 6/> YRS 4/> PARAM: CPT

## 2018-11-07 ENCOUNTER — APPOINTMENT (OUTPATIENT)
Dept: PULMONOLOGY | Facility: CLINIC | Age: 58
End: 2018-11-07

## 2018-11-07 ENCOUNTER — APPOINTMENT (OUTPATIENT)
Dept: INTERNAL MEDICINE | Facility: CLINIC | Age: 58
End: 2018-11-07

## 2018-11-19 NOTE — BEHAVIORAL HEALTH ASSESSMENT NOTE - NSBHADMITCOORDCARE_PSY_A_CORE
Before Your Surgery      Call your surgeon if there is any change in your health. This includes signs of a cold or flu (such as a sore throat, runny nose, cough, rash or fever).    Do not smoke, drink alcohol or take over the counter medicine (unless your surgeon or primary care doctor tells you to) for the 24 hours before and after surgery.    If you take prescribed drugs: Follow your doctor s orders about which medicines to take and which to stop until after surgery.    Eating and drinking prior to surgery: follow the instructions from your surgeon    Take a shower or bath the night before surgery. Use the soap your surgeon gave you to gently clean your skin. If you do not have soap from your surgeon, use your regular soap. Do not shave or scrub the surgery site.  Wear clean pajamas and have clean sheets on your bed.   
yes

## 2018-11-27 ENCOUNTER — OTHER (OUTPATIENT)
Age: 58
End: 2018-11-27

## 2018-11-27 ENCOUNTER — APPOINTMENT (OUTPATIENT)
Dept: UROLOGY | Facility: CLINIC | Age: 58
End: 2018-11-27
Payer: COMMERCIAL

## 2018-11-27 VITALS — DIASTOLIC BLOOD PRESSURE: 70 MMHG | TEMPERATURE: 97.9 F | SYSTOLIC BLOOD PRESSURE: 111 MMHG | HEART RATE: 73 BPM

## 2018-11-27 PROCEDURE — 99213 OFFICE O/P EST LOW 20 MIN: CPT

## 2018-12-13 ENCOUNTER — FORM ENCOUNTER (OUTPATIENT)
Age: 58
End: 2018-12-13

## 2018-12-14 ENCOUNTER — APPOINTMENT (OUTPATIENT)
Dept: CT IMAGING | Facility: HOSPITAL | Age: 58
End: 2018-12-14
Payer: COMMERCIAL

## 2018-12-14 ENCOUNTER — OUTPATIENT (OUTPATIENT)
Dept: OUTPATIENT SERVICES | Facility: HOSPITAL | Age: 58
LOS: 1 days | End: 2018-12-14
Payer: COMMERCIAL
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
## 2018-12-14 DIAGNOSIS — S69.92XS UNSPECIFIED INJURY OF LEFT WRIST, HAND AND FINGER(S), SEQUELA: Chronic | ICD-10-CM

## 2018-12-14 PROCEDURE — 74177 CT ABD & PELVIS W/CONTRAST: CPT

## 2018-12-14 PROCEDURE — 74177 CT ABD & PELVIS W/CONTRAST: CPT | Mod: 26

## 2019-01-04 ENCOUNTER — RESULT REVIEW (OUTPATIENT)
Age: 59
End: 2019-01-04

## 2019-01-08 ENCOUNTER — APPOINTMENT (OUTPATIENT)
Dept: UROLOGY | Facility: CLINIC | Age: 59
End: 2019-01-08
Payer: COMMERCIAL

## 2019-01-08 VITALS — SYSTOLIC BLOOD PRESSURE: 121 MMHG | HEART RATE: 60 BPM | TEMPERATURE: 98.2 F | DIASTOLIC BLOOD PRESSURE: 71 MMHG

## 2019-01-08 PROCEDURE — 99213 OFFICE O/P EST LOW 20 MIN: CPT

## 2019-01-09 NOTE — HISTORY OF PRESENT ILLNESS
[FreeTextEntry1] : now on eligard and zytiga following with med onc\par repeat CT scan - signficiant improvement in triston disease\par hydronephrosis markedly improved\par stents are in place

## 2019-01-09 NOTE — ASSESSMENT
[FreeTextEntry1] : metastatic prostate cancer\par hydronephrosis markedly improved\par for cysto bilateral stent removal\par contine with med onc

## 2019-01-25 ENCOUNTER — APPOINTMENT (OUTPATIENT)
Dept: UROLOGY | Facility: CLINIC | Age: 59
End: 2019-01-25

## 2019-01-25 ENCOUNTER — APPOINTMENT (OUTPATIENT)
Dept: UROLOGY | Facility: CLINIC | Age: 59
End: 2019-01-25
Payer: COMMERCIAL

## 2019-01-25 VITALS
BODY MASS INDEX: 25.73 KG/M2 | SYSTOLIC BLOOD PRESSURE: 133 MMHG | DIASTOLIC BLOOD PRESSURE: 78 MMHG | TEMPERATURE: 98 F | HEIGHT: 72 IN | HEART RATE: 73 BPM | WEIGHT: 190 LBS

## 2019-01-25 PROCEDURE — 52310 CYSTOSCOPY AND TREATMENT: CPT

## 2019-02-07 ENCOUNTER — FORM ENCOUNTER (OUTPATIENT)
Age: 59
End: 2019-02-07

## 2019-02-08 ENCOUNTER — OUTPATIENT (OUTPATIENT)
Dept: OUTPATIENT SERVICES | Facility: HOSPITAL | Age: 59
LOS: 1 days | End: 2019-02-08
Payer: COMMERCIAL

## 2019-02-08 ENCOUNTER — APPOINTMENT (OUTPATIENT)
Dept: ULTRASOUND IMAGING | Facility: HOSPITAL | Age: 59
End: 2019-02-08
Payer: COMMERCIAL

## 2019-02-08 DIAGNOSIS — S69.92XS UNSPECIFIED INJURY OF LEFT WRIST, HAND AND FINGER(S), SEQUELA: Chronic | ICD-10-CM
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
## 2019-02-08 PROCEDURE — 76770 US EXAM ABDO BACK WALL COMP: CPT

## 2019-02-08 PROCEDURE — 76770 US EXAM ABDO BACK WALL COMP: CPT | Mod: 26

## 2019-02-26 ENCOUNTER — APPOINTMENT (OUTPATIENT)
Dept: UROLOGY | Facility: CLINIC | Age: 59
End: 2019-02-26
Payer: COMMERCIAL

## 2019-02-26 VITALS
SYSTOLIC BLOOD PRESSURE: 125 MMHG | BODY MASS INDEX: 25.73 KG/M2 | HEIGHT: 72 IN | WEIGHT: 190 LBS | TEMPERATURE: 98.4 F | OXYGEN SATURATION: 100 % | HEART RATE: 84 BPM | DIASTOLIC BLOOD PRESSURE: 71 MMHG

## 2019-02-26 DIAGNOSIS — N13.30 UNSPECIFIED HYDRONEPHROSIS: ICD-10-CM

## 2019-02-26 PROCEDURE — 99213 OFFICE O/P EST LOW 20 MIN: CPT

## 2019-02-26 NOTE — ASSESSMENT
[FreeTextEntry1] : bilateral hydronephrosis\par metastatic CaP\par willr epeat renal ultrasound in 3 months\par CMP today\par f/u after

## 2019-02-26 NOTE — HISTORY OF PRESENT ILLNESS
[FreeTextEntry1] : s/p stent removal\par renal sonogram - mild right hydronephrosis, moderate left hydronephrosis\par no flank pian\par no fevers chills\par conitnues to follow with futuri\par PSA up to 80 from 50s

## 2019-02-27 LAB
ALBUMIN SERPL ELPH-MCNC: 3.9 G/DL
ALP BLD-CCNC: 103 U/L
ALT SERPL-CCNC: 42 U/L
ANION GAP SERPL CALC-SCNC: 9 MMOL/L
AST SERPL-CCNC: 31 U/L
BILIRUB SERPL-MCNC: 0.3 MG/DL
BUN SERPL-MCNC: 15 MG/DL
CALCIUM SERPL-MCNC: 8.6 MG/DL
CHLORIDE SERPL-SCNC: 100 MMOL/L
CO2 SERPL-SCNC: 27 MMOL/L
CREAT SERPL-MCNC: 0.67 MG/DL
GLUCOSE SERPL-MCNC: 82 MG/DL
POTASSIUM SERPL-SCNC: 4.8 MMOL/L
PROT SERPL-MCNC: 6.6 G/DL
SODIUM SERPL-SCNC: 136 MMOL/L

## 2019-05-03 ENCOUNTER — CLINICAL ADVICE (OUTPATIENT)
Age: 59
End: 2019-05-03

## 2019-05-03 ENCOUNTER — FORM ENCOUNTER (OUTPATIENT)
Age: 59
End: 2019-05-03

## 2019-05-04 ENCOUNTER — OUTPATIENT (OUTPATIENT)
Dept: OUTPATIENT SERVICES | Facility: HOSPITAL | Age: 59
LOS: 1 days | End: 2019-05-04
Payer: COMMERCIAL

## 2019-05-04 ENCOUNTER — APPOINTMENT (OUTPATIENT)
Dept: ULTRASOUND IMAGING | Facility: HOSPITAL | Age: 59
End: 2019-05-04

## 2019-05-04 DIAGNOSIS — S69.92XS UNSPECIFIED INJURY OF LEFT WRIST, HAND AND FINGER(S), SEQUELA: Chronic | ICD-10-CM
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
## 2019-05-04 PROCEDURE — 76770 US EXAM ABDO BACK WALL COMP: CPT

## 2019-05-04 PROCEDURE — 76770 US EXAM ABDO BACK WALL COMP: CPT | Mod: 26

## 2019-05-16 ENCOUNTER — APPOINTMENT (OUTPATIENT)
Dept: UROLOGY | Facility: CLINIC | Age: 59
End: 2019-05-16
Payer: COMMERCIAL

## 2019-05-16 VITALS — DIASTOLIC BLOOD PRESSURE: 87 MMHG | TEMPERATURE: 99.4 F | HEART RATE: 75 BPM | SYSTOLIC BLOOD PRESSURE: 154 MMHG

## 2019-05-16 DIAGNOSIS — C61 MALIGNANT NEOPLASM OF PROSTATE: ICD-10-CM

## 2019-05-16 PROCEDURE — 99214 OFFICE O/P EST MOD 30 MIN: CPT

## 2019-05-16 NOTE — HISTORY OF PRESENT ILLNESS
[FreeTextEntry1] : progression of hydronephrosis on ultrasound\par now off of zytiga\par  1 month ago\par refusing chemo\par wanst to try dietary changes\par last eligard 6months?\par ?due for ADT\par

## 2019-05-16 NOTE — ASSESSMENT
[FreeTextEntry1] : metastatic prostate cancer\par needs continued adt\par unrealistic expectations\par CMP today\par f/u 6 weeks\par

## 2019-05-20 ENCOUNTER — APPOINTMENT (OUTPATIENT)
Dept: INTERNAL MEDICINE | Facility: CLINIC | Age: 59
End: 2019-05-20
Payer: COMMERCIAL

## 2019-05-20 VITALS
OXYGEN SATURATION: 98 % | SYSTOLIC BLOOD PRESSURE: 155 MMHG | HEART RATE: 80 BPM | BODY MASS INDEX: 25.84 KG/M2 | DIASTOLIC BLOOD PRESSURE: 82 MMHG | HEIGHT: 73 IN | TEMPERATURE: 98.4 F | WEIGHT: 195 LBS

## 2019-05-20 DIAGNOSIS — R73.03 PREDIABETES.: ICD-10-CM

## 2019-05-20 DIAGNOSIS — B19.20 UNSPECIFIED VIRAL HEPATITIS C W/OUT HEPATIC COMA: ICD-10-CM

## 2019-05-20 DIAGNOSIS — E78.5 HYPERLIPIDEMIA, UNSPECIFIED: ICD-10-CM

## 2019-05-20 DIAGNOSIS — I10 ESSENTIAL (PRIMARY) HYPERTENSION: ICD-10-CM

## 2019-05-20 DIAGNOSIS — Z86.718 PERSONAL HISTORY OF OTHER VENOUS THROMBOSIS AND EMBOLISM: ICD-10-CM

## 2019-05-20 LAB
ALBUMIN SERPL ELPH-MCNC: 3.9 G/DL
ALP BLD-CCNC: 243 U/L
ALT SERPL-CCNC: 40 U/L
ANION GAP SERPL CALC-SCNC: 11 MMOL/L
AST SERPL-CCNC: 47 U/L
BILIRUB SERPL-MCNC: 0.4 MG/DL
BUN SERPL-MCNC: 10 MG/DL
CALCIUM SERPL-MCNC: 8.1 MG/DL
CHLORIDE SERPL-SCNC: 102 MMOL/L
CO2 SERPL-SCNC: 29 MMOL/L
CREAT SERPL-MCNC: 0.69 MG/DL
GLUCOSE SERPL-MCNC: 97 MG/DL
POTASSIUM SERPL-SCNC: 3.7 MMOL/L
PROT SERPL-MCNC: 6.4 G/DL
SODIUM SERPL-SCNC: 142 MMOL/L

## 2019-05-20 PROCEDURE — 99215 OFFICE O/P EST HI 40 MIN: CPT | Mod: GC

## 2019-05-20 RX ORDER — QUETIAPINE FUMARATE 50 MG/1
50 TABLET ORAL
Qty: 30 | Refills: 0 | Status: DISCONTINUED | COMMUNITY
Start: 2018-09-07 | End: 2019-05-20

## 2019-05-20 RX ORDER — APIXABAN 5 MG/1
TABLET, FILM COATED ORAL
Refills: 0 | Status: DISCONTINUED | COMMUNITY
End: 2019-05-20

## 2019-05-20 RX ORDER — ABIRATERONE ACETATE 250 MG/1
250 TABLET ORAL
Refills: 0 | Status: ACTIVE | COMMUNITY

## 2019-05-20 RX ORDER — LEUPROLIDE ACETATE 30 MG
30 KIT INTRAMUSCULAR
Refills: 0 | Status: DISCONTINUED | COMMUNITY
End: 2019-05-20

## 2019-05-20 RX ORDER — PREDNISONE 5 MG/1
5 TABLET ORAL
Refills: 0 | Status: DISCONTINUED | COMMUNITY
End: 2019-05-20

## 2019-05-21 PROBLEM — E78.5 HLD (HYPERLIPIDEMIA): Status: ACTIVE | Noted: 2019-05-21

## 2019-05-21 PROBLEM — I10 HTN (HYPERTENSION): Status: ACTIVE | Noted: 2019-05-21

## 2019-05-21 PROBLEM — R73.03 PRE-DIABETES: Status: ACTIVE | Noted: 2019-05-21

## 2019-05-21 RX ORDER — PROPRANOLOL HYDROCHLORIDE 40 MG/1
40 TABLET ORAL
Qty: 60 | Refills: 2 | Status: ACTIVE | COMMUNITY
Start: 2019-05-21 | End: 1900-01-01

## 2019-05-21 RX ORDER — METFORMIN HYDROCHLORIDE 500 MG/1
500 TABLET, FILM COATED, EXTENDED RELEASE ORAL DAILY
Qty: 30 | Refills: 2 | Status: ACTIVE | COMMUNITY
Start: 2019-05-21 | End: 1900-01-01

## 2019-05-21 RX ORDER — LOVASTATIN 20 MG/1
20 TABLET ORAL
Qty: 30 | Refills: 2 | Status: ACTIVE | COMMUNITY
Start: 2019-05-21 | End: 1900-01-01

## 2019-05-22 ENCOUNTER — RX RENEWAL (OUTPATIENT)
Age: 59
End: 2019-05-22

## 2019-05-22 RX ORDER — METFORMIN ER 500 MG 500 MG/1
500 TABLET ORAL DAILY
Qty: 30 | Refills: 1 | Status: ACTIVE | COMMUNITY
Start: 2019-05-22 | End: 1900-01-01

## 2019-05-28 ENCOUNTER — APPOINTMENT (OUTPATIENT)
Dept: UROLOGY | Facility: CLINIC | Age: 59
End: 2019-05-28
Payer: COMMERCIAL

## 2019-06-18 ENCOUNTER — APPOINTMENT (OUTPATIENT)
Dept: UROLOGY | Facility: CLINIC | Age: 59
End: 2019-06-18
Payer: COMMERCIAL

## 2019-06-18 ENCOUNTER — OTHER (OUTPATIENT)
Age: 59
End: 2019-06-18

## 2019-06-18 VITALS — HEART RATE: 77 BPM | DIASTOLIC BLOOD PRESSURE: 77 MMHG | TEMPERATURE: 97.7 F | SYSTOLIC BLOOD PRESSURE: 125 MMHG

## 2019-06-18 DIAGNOSIS — C79.51 MALIGNANT NEOPLASM OF PROSTATE: ICD-10-CM

## 2019-06-18 DIAGNOSIS — C61 MALIGNANT NEOPLASM OF PROSTATE: ICD-10-CM

## 2019-06-18 PROCEDURE — 96402 CHEMO HORMON ANTINEOPL SQ/IM: CPT

## 2019-06-18 PROCEDURE — 99213 OFFICE O/P EST LOW 20 MIN: CPT | Mod: 25

## 2019-06-19 LAB
ALBUMIN SERPL ELPH-MCNC: 4.2 G/DL
ALP BLD-CCNC: 272 U/L
ALT SERPL-CCNC: 27 U/L
ANION GAP SERPL CALC-SCNC: 11 MMOL/L
AST SERPL-CCNC: 31 U/L
BILIRUB SERPL-MCNC: 0.4 MG/DL
BUN SERPL-MCNC: 17 MG/DL
CALCIUM SERPL-MCNC: 8.6 MG/DL
CHLORIDE SERPL-SCNC: 96 MMOL/L
CO2 SERPL-SCNC: 28 MMOL/L
CREAT SERPL-MCNC: 0.74 MG/DL
GLUCOSE SERPL-MCNC: 115 MG/DL
POTASSIUM SERPL-SCNC: 3.9 MMOL/L
PROT SERPL-MCNC: 6.7 G/DL
SODIUM SERPL-SCNC: 134 MMOL/L

## 2019-06-19 NOTE — ASSESSMENT
[FreeTextEntry1] : metastatic CaP\par b/l hydronephrosis\par discussed with patient - he is strongly opposed to chemotherapy\par can consider monitoring creatinine trend vs stent insertion now\par he is opposed to stent placement\par ELIGARD 45 given today\par f/u 1 month with repeat labs\par I again suggested med onc eval

## 2019-06-19 NOTE — HISTORY OF PRESENT ILLNESS
[FreeTextEntry1] : returns for follow up\par currnetly considereingstereotactic radiation and IV vitamin C treatments\par no pain\par no fevesr\par good energy levels\par here to reinitate ADT

## 2019-07-16 ENCOUNTER — APPOINTMENT (OUTPATIENT)
Dept: UROLOGY | Facility: CLINIC | Age: 59
End: 2019-07-16

## 2019-07-22 LAB — PSA SERPL-MCNC: 954 NG/ML

## 2019-12-11 NOTE — PROGRESS NOTE BEHAVIORAL HEALTH - SUMMARY
Internal Medicine Mr. Aguilar is a 58-year old employed,  man with a past history of alcohol use disorder, in FSR, symptoms likely consistent with depressive illness, not currently in outpatient care, who presented to ED after suicide attempt via cutting wrist in context of multiple stressors (mostly financial) as well as history of taking multiple supplements and increasing insomnia, now diagnosed with HCV and being treated for hyponatremia/SIADH, including requiring fluid restriction and 1:1 to maintain no fluid intake and safety. He continues to report repetitive thoughts about his suicide attempt, reporting feelings of guilt and regret as well as thoughts about his inability to cope with his thoughts and feelings.  While Mr. Aguilar is not currently suicidal at time of today's assessment, his recent suicidal attempt represents an acute risk given his recent lethal attempt, feelings of guilt and shame, his precarious financial situation, in addition to his gender and race, and lack of outpatient treatment. He continues to require inpatient psychiatric hospitalization for his safety. Pt will be transferred to [facility] [place] today

## 2020-01-08 NOTE — CONSULT NOTE ADULT - CONSULT REASON
Patient presents with fatigue, generalized body aches, rhinorrhea, nasal congestion, mild sore throat, productive cough, chest aching from coughing, and chills since yesterday afternoon. States he has had a mild URI for about 3 weeks. Taking Robitussin without relief. Got flu shot in October.     Patient would like communication of their results via:        Cell Phone:   Telephone Information:   Mobile [phone]     Okay to leave a message containing results? Yes     excisional biopsy of LN

## 2020-02-18 NOTE — PROGRESS NOTE BEHAVIORAL HEALTH - NS ED [facility] MED ROS ENDOCRINE
No complaints
Imaging Studies/EKG/Labs/Medications
No complaints
No complaints

## 2021-08-04 NOTE — ED PROVIDER NOTE - CADM POA PRESS ULCER
Detail Level: Detailed
Quality 226: Preventive Care And Screening: Tobacco Use: Screening And Cessation Intervention: Patient screened for tobacco use and is an ex/non-smoker
Quality 111:Pneumonia Vaccination Status For Older Adults: Pneumococcal Vaccination Previously Received
Quality 130: Documentation Of Current Medications In The Medical Record: Current Medications Documented
Quality 431: Preventive Care And Screening: Unhealthy Alcohol Use - Screening: Patient screened for unhealthy alcohol use using a single question and scores less than 2 times per year
Quality 110: Preventive Care And Screening: Influenza Immunization: Influenza Immunization Administered during Influenza season
No

## 2022-12-15 NOTE — BEHAVIORAL HEALTH ASSESSMENT NOTE - NS ED [facility] MED ROS MUSCULOSKELETAL
Pt completed C2D4 of Topotecan. VSS. Pt's pain in control today. Is ambulating with more ease. Pt received pre-med of Dexamethasone. Topotecan infused over 30 minutes. Tolerated well. Will return tomorrow for C2D5 of Fulphila. Discharged from unit in [facility].   No complaints

## 2024-07-31 NOTE — 1. 4.0 ADULT PLAN OF CARE - VENOUS THROMBOEMBOLIC DISEASE: SIGNS AND SYMPTOMS OF POTENTIAL PROBLEMS ASSESSED, QM
Weight Management Medical Nutrition Assessment  John was here today for meal planning.  Today he weigh 231.8 lbs and has goal alcides of 170 - 175 lbs. He is not currently logging his food or exercising consistently.  Reviewed his calories carb and protein needs.  Discussed portion sizes, lean protein and healthy snack choices.  Stressed the importance of consistency  not perfection.  Recommend food logging and regular exercise.  Questions asked and answered.     Patient seen by Medical Provider in past 6 months:  yes  Requested to schedule appointment with Medical Provider: No      Anthropometric Measurements  Start Weight (#): 231.8  Current Weight (#): 231.8  TBW % Change from start weight:0%  Ideal Body Weight (#):142  Goal Weight (#):170 - 175  Highest: 250  Lowest: 160    Weight Loss History  Previous weight loss attempts: Self Created Diets (Portion Control, Healthy Food Choices, etc.)    Food and Nutrition Related History  Wake up: 5 am    Bed Time:8 - 9 pm    Food Recall  Breakfast:breakfast on wraps on work days     Snack:none  Lunch:left overs  or sometimes will have sometimes  Snack:fruit   Dinner:protein, carb and veg (larger portions)  Snack:ice cream or pop corn       Beverages: regular soda ocasaionally, water  Volume of beverage intake: 60 sometimes not consist    Weekends: Same  Cravings: savory  Trouble area of day:night     Frequency of Eating out: irregularly  Food restrictions:none  Cooking: self   Food Shopping: self    Physical Activity Intake  Activity:Walking  Frequency:infrequently  Physical limitations/barriers to exercise: none    Estimated Needs  Energy    [employer] Needs: BMR : 1949   1-2# loss weekly sedentary:  1339 - 1839             1-2# loss weekly lightly active:1680 - 2180  Maintenance calories for sedentary activity level: 2339  Protein:77 - 97      (1.2-1.5g/kg IBW)  Fluid: 76     (35mL/kg IBW)    Nutrition Diagnosis  Yes;    Overweight/obesity  related to Excess  all energy intake as evidenced by  BMI more than normative standard for age and sex (obesity-grade II 35-39.9)       Nutrition Intervention    Nutrition Prescription  Calories:1600 - 1800  Protein:77 - 97  Fluid: 76    Nutrition Education:    Calorie controlled menu  Lean protein food choices  Healthy snack options  Food journaling tips      Nutrition Counseling:  Strategies: meal planning, portion sizes, healthy snack choices, hydration, fiber intake, protein intake, exercise, food journal      Monitoring and Evaluation:  Evaluation criteria:  Energy Intake  Meet protein needs  Maintain adequate hydration  Monitor weekly weight  Meal planning/preparation  Food journal   Decreased portions at mealtimes and snacks  Physical activity     Barriers to learning:none  Readiness to change: Action:  (Changing behavior)  Comprehension: good  Expected Compliance: good

## 2025-01-02 NOTE — ED PROVIDER NOTE - CADM POA URETHRAL CATHETER
What medication is being referred to?  Patient was discharged from the hospital on Paxlovid, which is twice daily for 5 days, so the script is correct if that is the medication being discussed   No

## 2025-04-29 NOTE — PROGRESS NOTE BEHAVIORAL HEALTH - DETAILS
Pt discharged at this time,AOx3 at time of discharged. Pt verbalized understanding of discharge instructions. Encouraged questions, no questions at this time. Pt  ambulated to lobby with no incident. Caroline Hidalgo MA      Left hand numbness

## 2025-07-03 NOTE — DISCHARGE NOTE ADULT - PATIENT PORTAL LINK FT
You can access the [facility] Patient Portal, offered by [facility], by registering with the following website: [email or web address]
Orientation to room/Bed in low position, brakes on/Side rails x 2 or 4 up, assess large gaps, such that a patient could get extremity or other body part entrapped, use additional safety procedures/Call light is within reach, educate patient/family on its functionality/Patient and family education available to parents and patient